# Patient Record
Sex: MALE | Race: WHITE | NOT HISPANIC OR LATINO | Employment: OTHER | URBAN - METROPOLITAN AREA
[De-identification: names, ages, dates, MRNs, and addresses within clinical notes are randomized per-mention and may not be internally consistent; named-entity substitution may affect disease eponyms.]

---

## 2017-02-02 ENCOUNTER — HOSPITAL ENCOUNTER (OUTPATIENT)
Dept: RADIOLOGY | Facility: HOSPITAL | Age: 62
Discharge: HOME | End: 2017-02-02
Attending: ORTHOPAEDIC SURGERY
Payer: COMMERCIAL

## 2017-02-02 ENCOUNTER — OFFICE VISIT (OUTPATIENT)
Dept: ORTHOPEDICS | Facility: CLINIC | Age: 62
End: 2017-02-02
Payer: COMMERCIAL

## 2017-02-02 VITALS
BODY MASS INDEX: 27.9 KG/M2 | SYSTOLIC BLOOD PRESSURE: 114 MMHG | HEART RATE: 50 BPM | HEIGHT: 74 IN | WEIGHT: 217.38 LBS | DIASTOLIC BLOOD PRESSURE: 65 MMHG

## 2017-02-02 DIAGNOSIS — M17.11 PRIMARY OSTEOARTHRITIS OF RIGHT KNEE: ICD-10-CM

## 2017-02-02 DIAGNOSIS — Z96.652 STATUS POST TOTAL LEFT KNEE REPLACEMENT: Primary | ICD-10-CM

## 2017-02-02 PROCEDURE — 99214 OFFICE O/P EST MOD 30 MIN: CPT | Mod: 25,S$GLB,, | Performed by: ORTHOPAEDIC SURGERY

## 2017-02-02 PROCEDURE — 73564 X-RAY EXAM KNEE 4 OR MORE: CPT | Mod: TC,50

## 2017-02-02 PROCEDURE — 73564 X-RAY EXAM KNEE 4 OR MORE: CPT | Mod: 26,50,, | Performed by: RADIOLOGY

## 2017-02-02 PROCEDURE — 20610 DRAIN/INJ JOINT/BURSA W/O US: CPT | Mod: RT,S$GLB,, | Performed by: ORTHOPAEDIC SURGERY

## 2017-02-02 PROCEDURE — 99999 PR PBB SHADOW E&M-EST. PATIENT-LVL II: CPT | Mod: PBBFAC,,, | Performed by: ORTHOPAEDIC SURGERY

## 2017-02-02 NOTE — PROCEDURES
Large Joint Aspiration/Injection  Date/Time: 2/2/2017 9:34 AM  Performed by: CHRISTOPHER EAST  Authorized by: CHRISTOPHER EAST     Consent Done?:  Yes (Verbal)  Indications:  Pain  Procedure site marked: Yes    Timeout: Prior to procedure the correct patient, procedure, and site was verified      Location:  Knee  Site:  R knee  Prep: Patient was prepped and draped in usual sterile fashion    Ultrasonic Guidance for needle placement: No  Needle size:  22 G  Approach:  Anteromedial

## 2017-02-02 NOTE — PROGRESS NOTES
CHIEF COMPLAINT: Right  Knee pain.                                                          HISTORY OF PRESENT ILLNESS:  The patient is a 61 y.o. male  who presents  for evaluation of his right knee pain.     Pain Duration: 1 year  Pain Quality: dull  Pain Context:worsening  Pain Timing: intermittent - worse when going from sitting to standing  Pain Location:lateral  Pain Severity: moderate  Modifying Factors: OTC NSAID's minimal change  Associated Signs and Symptoms: swelling    He  presents for further treatment recommendations.    He denies radicular pain or low back pain.  He denies distal paresthesias, lower extremity edema, or calf pain concerning for vascular claudication.  He has no history of discreet prior trauma.                                                                                                                       PAST MEDICAL HISTORY:    Past Medical History   Diagnosis Date    Elevated PSA     Personal history of venous thrombosis and embolism      After trauma in the 70's, he had a DVT with a PE    Sleep apnea                                                                                                             PAST SURGICAL HISTORY:    Past Surgical History   Procedure Laterality Date    Knee surgery       left knee    Appendectomy      Eye surgery       Eyelid    Prostate biopsy      Tonsillectomy      Adenoidectomy                                                                                                                 SOCIAL HISTORY:  Reviewed per EPIC history for tobacco or alcohol use and he   is an active  61 y.o.  male                                                                             FAMILY MEDICAL HISTORY:  family history includes Cancer in his father; Prostate cancer in his father. There is no history of Heart disease.                                                                                                                                                  PHYSICAL EXAMINATION:                                                        GENERAL:  A well-developed, well-nourished 61 y.o. male who is alert and       oriented in no acute distress.      Gait: He  walks with a normal gait.                   EXTREMITIES:  Examination of lower extremities reveals there is no visible mass or deformity.    Left knee:  ROM 0-120    Ligamentously stable to varus/valgus stress.    Well healed anterior incision    No pain over pes bursa.    No warmth    No erythema    Effusion No    Right knee:  ROM 0-125 - hypervalgus    Ligamentously stable to varus/valgus stress.  Lateral crepitation    Anterior and posterior drawers negative.    No pain over pes bursa.    No warmth    No erythema    Effusion Yes - mild    The skin over both lower extremities is normal and unremarkable.  He has a  painless range of motion of the hips and ankles bilaterally.   Sensation is intact in both lower extremities.    There are no motor deficits in the lower extremities bilaterally.   Pedal pulses are palpable distally bilaterally.    He has no calf tenderness to palpation nor edema.    AP standing, flexion, lateral, and Merchant's radiographs of the knees were ordered and personally reviewed with the patient today.  These show a well fixed left TKR.  Radiographs show advanced DJD with joint space narrowing, sclerosis, and osteophytes laterally on the right.                                                                             IMPRESSION: Osteoarthritis right knee.                             The conservative options including NSAIDs, activity modification, physical therapy, corticosteroid injection, and viscosupplimentation were discussed.  He wishes to proceed with injection on the right.  After time out was performed and patient ID, side, and site were verified, the right knee was sterilly prepped in the standard fashion.  A 22-gauge needle was introduced into the joint from an  infero-medial site without complication. The knee was then injected with 80 mg kenelog and 3 cc 1% plain lidocaine.  Sterile dressing was applied.  The patient was informed that they may resume activities as tolerated.

## 2017-09-05 ENCOUNTER — PATIENT MESSAGE (OUTPATIENT)
Dept: ENDOSCOPY | Facility: HOSPITAL | Age: 62
End: 2017-09-05

## 2017-09-05 DIAGNOSIS — Z12.11 COLON CANCER SCREENING: Primary | ICD-10-CM

## 2017-09-05 NOTE — TELEPHONE ENCOUNTER
I have signed for the following orders AND/OR meds.  Please call the patient and ask the patient to schedule the testing AND/OR inform about any medications that were sent.      Orders Placed This Encounter   Procedures    Fecal Immunochemical Test (iFOBT)     Standing Status:   Future     Standing Expiration Date:   11/4/2018

## 2017-09-25 ENCOUNTER — OFFICE VISIT (OUTPATIENT)
Dept: ORTHOPEDICS | Facility: CLINIC | Age: 62
End: 2017-09-25
Payer: COMMERCIAL

## 2017-09-25 VITALS — BODY MASS INDEX: 27.85 KG/M2 | HEIGHT: 74 IN | WEIGHT: 217 LBS

## 2017-09-25 DIAGNOSIS — G56.21 CUBITAL TUNNEL SYNDROME ON RIGHT: ICD-10-CM

## 2017-09-25 DIAGNOSIS — G56.22 CUBITAL TUNNEL SYNDROME, LEFT: Primary | ICD-10-CM

## 2017-09-25 PROCEDURE — 99999 PR PBB SHADOW E&M-EST. PATIENT-LVL III: CPT | Mod: PBBFAC,,, | Performed by: ORTHOPAEDIC SURGERY

## 2017-09-25 PROCEDURE — 99213 OFFICE O/P EST LOW 20 MIN: CPT | Mod: S$GLB,,, | Performed by: ORTHOPAEDIC SURGERY

## 2017-09-25 PROCEDURE — 3008F BODY MASS INDEX DOCD: CPT | Mod: S$GLB,,, | Performed by: ORTHOPAEDIC SURGERY

## 2017-09-25 NOTE — PROGRESS NOTES
HISTORY OF PRESENT ILLNESS:  Mr. Gay is a little over a year out from ulnar   nerve transposition of the right elbow.  He did have severe involvement at the   time of surgery.  He did well for a while, but then had seen just sort of    plateau in terms of improvement.  He has some residual weakness in the right   hand and residual numbness in the right small finger.  He is just wondering if   anything else can be done for this.  He really does not have any pain.  He never   went through therapy, however.    PHYSICAL EXAMINATION:  RIGHT ARM:  The elbow incision is well healed.  No swelling, no tenderness.    Range of motion of elbow is full The right hand looks good.  There is no   significant swelling.  There is no clawing.  He does have a little bit of   weakness of the intrinsics, however and slightly decreased sensation in the   right small finger.    PLAN:  We did discuss a nerve test today, but he really does not want that.  He   feels that was worse than the surgery itself, but since he never did therapy, I   would like to set up some therapy for him close to where he lives.  I think we   should also try some compounded cream as well, so we will order some compounded   cream for the right hand and some therapy and follow up in 6 to 8 weeks.      MARGARITA  dd: 09/25/2017 11:16:19 (CDT)  td: 09/25/2017 23:25:41 (CDT)  Doc ID   #[id number]  Job ID #[id number]    CC:

## 2017-10-13 ENCOUNTER — TELEPHONE (OUTPATIENT)
Dept: FAMILY MEDICINE | Facility: CLINIC | Age: 62
End: 2017-10-13

## 2017-11-01 ENCOUNTER — PATIENT OUTREACH (OUTPATIENT)
Dept: ADMINISTRATIVE | Facility: HOSPITAL | Age: 62
End: 2017-11-01

## 2017-11-20 ENCOUNTER — OFFICE VISIT (OUTPATIENT)
Dept: ORTHOPEDICS | Facility: CLINIC | Age: 62
End: 2017-11-20
Payer: COMMERCIAL

## 2017-11-20 DIAGNOSIS — G56.21 CUBITAL TUNNEL SYNDROME ON RIGHT: Primary | ICD-10-CM

## 2017-11-20 PROCEDURE — 99999 PR PBB SHADOW E&M-EST. PATIENT-LVL II: CPT | Mod: PBBFAC,,, | Performed by: ORTHOPAEDIC SURGERY

## 2017-11-20 PROCEDURE — 99213 OFFICE O/P EST LOW 20 MIN: CPT | Mod: S$GLB,,, | Performed by: ORTHOPAEDIC SURGERY

## 2017-11-20 NOTE — PROGRESS NOTES
HISTORY OF PRESENT ILLNESS:  Mr. Gay in followup of ulnar nerve neuropathy of   the right arm, is actually doing better.  He states in the last several months,   he started to get the feeling back in the right ring and small finger and   strength is improved.    PHYSICAL EXAMINATION:  Range of motion of elbow is good.  No tenderness.    Strength is improved.  No clawing.  Sensation improved right ring and small   finger.    PLAN:  The compounded cream and therapy seemed to be working.  I have   recommended that he continue with the compounded cream.  He feels comfortable   doing the exercises at home and return to full activities.  Follow up as needed.      MARGARITA  dd: 11/20/2017 08:37:20 (CST)  td: 11/20/2017 10:46:54 (CST)  Doc ID   #[id number]  Job ID #[id number]    CC:

## 2018-02-07 ENCOUNTER — PATIENT MESSAGE (OUTPATIENT)
Dept: ORTHOPEDICS | Facility: CLINIC | Age: 63
End: 2018-02-07

## 2018-02-26 ENCOUNTER — LAB VISIT (OUTPATIENT)
Dept: LAB | Facility: HOSPITAL | Age: 63
End: 2018-02-26
Attending: FAMILY MEDICINE
Payer: COMMERCIAL

## 2018-02-26 DIAGNOSIS — Z12.11 COLON CANCER SCREENING: ICD-10-CM

## 2018-02-26 LAB — HEMOCCULT STL QL IA: NEGATIVE

## 2018-02-26 PROCEDURE — 82274 ASSAY TEST FOR BLOOD FECAL: CPT

## 2018-03-05 ENCOUNTER — OFFICE VISIT (OUTPATIENT)
Dept: FAMILY MEDICINE | Facility: CLINIC | Age: 63
End: 2018-03-05
Payer: COMMERCIAL

## 2018-03-05 ENCOUNTER — TELEPHONE (OUTPATIENT)
Dept: FAMILY MEDICINE | Facility: CLINIC | Age: 63
End: 2018-03-05

## 2018-03-05 ENCOUNTER — PATIENT MESSAGE (OUTPATIENT)
Dept: FAMILY MEDICINE | Facility: CLINIC | Age: 63
End: 2018-03-05

## 2018-03-05 ENCOUNTER — HOSPITAL ENCOUNTER (OUTPATIENT)
Dept: RADIOLOGY | Facility: HOSPITAL | Age: 63
Discharge: HOME | End: 2018-03-05
Attending: FAMILY MEDICINE
Payer: COMMERCIAL

## 2018-03-05 VITALS
BODY MASS INDEX: 28.4 KG/M2 | HEART RATE: 54 BPM | DIASTOLIC BLOOD PRESSURE: 59 MMHG | TEMPERATURE: 98 F | SYSTOLIC BLOOD PRESSURE: 104 MMHG | WEIGHT: 221.31 LBS | HEIGHT: 74 IN

## 2018-03-05 DIAGNOSIS — E78.5 HYPERLIPIDEMIA, UNSPECIFIED HYPERLIPIDEMIA TYPE: Primary | ICD-10-CM

## 2018-03-05 DIAGNOSIS — I77.9 CAROTID ARTERY DISEASE, UNSPECIFIED LATERALITY: ICD-10-CM

## 2018-03-05 DIAGNOSIS — I77.9 CAROTID ARTERY DISEASE, UNSPECIFIED LATERALITY: Primary | ICD-10-CM

## 2018-03-05 PROCEDURE — 93880 EXTRACRANIAL BILAT STUDY: CPT | Mod: 26,,, | Performed by: RADIOLOGY

## 2018-03-05 PROCEDURE — 99213 OFFICE O/P EST LOW 20 MIN: CPT | Mod: S$GLB,,, | Performed by: FAMILY MEDICINE

## 2018-03-05 PROCEDURE — 99999 PR PBB SHADOW E&M-EST. PATIENT-LVL III: CPT | Mod: PBBFAC,,, | Performed by: FAMILY MEDICINE

## 2018-03-05 PROCEDURE — 93880 EXTRACRANIAL BILAT STUDY: CPT | Mod: TC,PO

## 2018-03-05 NOTE — PROGRESS NOTES
The patient presents with recent panorex per DDS suggesting carotid disease Assymptomatic   ROS:  General: No fever or sig wt change  HEENT:No other PND eye pain or dc  Respiratory: No cough wheezing  PE: vital signs noted  HEENT: Normocephalic,with no recent trauma,PERRLA,EOMI,conjunctiva injected with no exudate.Nasopharynx is injected and edematous.External otic canal edematous and injected TM dull  Neck:Supple without adenopathy,no bruits   Chest:Clear bilateral breath sounds    Heart:Regular rhthym without murmer  Abdomen:Soft, non tender,no masses, no hepatosplenomegaly  Extremeties and Neurologic:Grossly within normal limits     Impression:Carotid artery disease   Plan:Lipids , Carotid US

## 2018-03-05 NOTE — TELEPHONE ENCOUNTER
I have signed for the following orders AND/OR meds.  Please call the patient and ask the patient to schedule the testing AND/OR inform about any medications that were sent.      Orders Placed This Encounter   Procedures    Lipid panel     Standing Status:   Standing     Number of Occurrences:   99     Standing Expiration Date:   2/28/2038

## 2018-05-08 ENCOUNTER — PATIENT MESSAGE (OUTPATIENT)
Dept: FAMILY MEDICINE | Facility: CLINIC | Age: 63
End: 2018-05-08

## 2018-05-14 ENCOUNTER — PATIENT OUTREACH (OUTPATIENT)
Dept: ADMINISTRATIVE | Facility: HOSPITAL | Age: 63
End: 2018-05-14

## 2018-05-14 NOTE — PROGRESS NOTES
Pre-Visit Chart audit complete and HM updated.  Alert PCP/Staff concering HM updates needed. Hep C Screening and CMP order pended.

## 2018-05-28 ENCOUNTER — OFFICE VISIT (OUTPATIENT)
Dept: PODIATRY | Facility: CLINIC | Age: 63
End: 2018-05-28
Payer: COMMERCIAL

## 2018-05-28 ENCOUNTER — OFFICE VISIT (OUTPATIENT)
Dept: FAMILY MEDICINE | Facility: CLINIC | Age: 63
End: 2018-05-28
Payer: COMMERCIAL

## 2018-05-28 ENCOUNTER — HOSPITAL ENCOUNTER (OUTPATIENT)
Dept: RADIOLOGY | Facility: HOSPITAL | Age: 63
Discharge: HOME | End: 2018-05-28
Attending: PODIATRIST
Payer: COMMERCIAL

## 2018-05-28 VITALS — WEIGHT: 222.44 LBS | BODY MASS INDEX: 28.55 KG/M2 | HEIGHT: 74 IN

## 2018-05-28 VITALS
SYSTOLIC BLOOD PRESSURE: 103 MMHG | HEART RATE: 53 BPM | HEIGHT: 74 IN | WEIGHT: 223 LBS | DIASTOLIC BLOOD PRESSURE: 53 MMHG | TEMPERATURE: 98 F | BODY MASS INDEX: 28.62 KG/M2

## 2018-05-28 DIAGNOSIS — M72.2 PLANTAR FASCIITIS OF LEFT FOOT: Primary | ICD-10-CM

## 2018-05-28 DIAGNOSIS — M19.172 POST-TRAUMATIC ARTHRITIS OF LEFT ANKLE: ICD-10-CM

## 2018-05-28 DIAGNOSIS — G89.29 CHRONIC PAIN OF LEFT ANKLE: ICD-10-CM

## 2018-05-28 DIAGNOSIS — M25.572 CHRONIC PAIN OF LEFT ANKLE: ICD-10-CM

## 2018-05-28 DIAGNOSIS — Q66.70 HIGH ARCHES: ICD-10-CM

## 2018-05-28 DIAGNOSIS — Z02.89 PHYSICAL EXAMINATION OF EMPLOYEE: Primary | ICD-10-CM

## 2018-05-28 DIAGNOSIS — M24.572 EQUINUS CONTRACTURE OF LEFT ANKLE: ICD-10-CM

## 2018-05-28 DIAGNOSIS — M72.2 PLANTAR FASCIITIS OF LEFT FOOT: ICD-10-CM

## 2018-05-28 PROCEDURE — 99999 PR PBB SHADOW E&M-EST. PATIENT-LVL III: CPT | Mod: PBBFAC,,, | Performed by: FAMILY MEDICINE

## 2018-05-28 PROCEDURE — 3008F BODY MASS INDEX DOCD: CPT | Mod: CPTII,S$GLB,, | Performed by: FAMILY MEDICINE

## 2018-05-28 PROCEDURE — 73610 X-RAY EXAM OF ANKLE: CPT | Mod: 26,LT,, | Performed by: RADIOLOGY

## 2018-05-28 PROCEDURE — 99213 OFFICE O/P EST LOW 20 MIN: CPT | Mod: S$GLB,,, | Performed by: FAMILY MEDICINE

## 2018-05-28 PROCEDURE — 73630 X-RAY EXAM OF FOOT: CPT | Mod: 26,LT,, | Performed by: RADIOLOGY

## 2018-05-28 PROCEDURE — 99203 OFFICE O/P NEW LOW 30 MIN: CPT | Mod: S$GLB,,, | Performed by: PODIATRIST

## 2018-05-28 PROCEDURE — 99999 PR PBB SHADOW E&M-EST. PATIENT-LVL III: CPT | Mod: PBBFAC,,, | Performed by: PODIATRIST

## 2018-05-28 PROCEDURE — 3008F BODY MASS INDEX DOCD: CPT | Mod: CPTII,S$GLB,, | Performed by: PODIATRIST

## 2018-05-28 RX ORDER — METHYLPREDNISOLONE 4 MG/1
TABLET ORAL
Qty: 1 PACKAGE | Refills: 0 | Status: SHIPPED | OUTPATIENT
Start: 2018-05-28 | End: 2018-06-18

## 2018-05-28 NOTE — PATIENT INSTRUCTIONS
1. Stretch calf and plantar fascia at least 3x per day for 30 sec and before getting out of bed.    2. Supportive shoes at all times I recommend Felix Gutierrez 5 found at [employer] in [place]       ([employer], [employer], [place] [employer], [employer], [employer]feet, [employer]ile[employer], [employer], [employer], [employer] shoes, [employer], [facility] [employer] pro shop) [email or web address]://[email or web address]    3. Orthotic (recommend the following brands: Superfeet, Spenco, Powerstep, Sof Sole Fit Series)                  5. ICE massage with frozen water bottle 2x per day for 30 minutes.    6. Consider night splint, custom orthotics, therapy and/or steroid injection

## 2018-05-28 NOTE — PROGRESS NOTES
Subjective:      Patient ID: Frank Gay Jr. is a 62 y.o. male.    Chief Complaint: Foot Pain (Left - when walking or standing) and Other Misc (PCP:  Dr Barron (Dr Mendiola) 3/5/18)    Frank is a 62 y.o. male who presents to the podiatry clinic  with complaint of  left foot pain. Onset of the symptoms was several weeks ago. Precipitating event: relates he walks in steel toe boots at work is on hard surfaces a lot and noticed the pain begin mostly when at work and has worsened since. Current symptoms include: ability to bear weight, but with some pain and worsening symptoms after a period of activity. Aggravating factors: any weight bearing. Symptoms have gradually worsened. Patient has had prior foot problems with a left ankle fracture several years ago. Evaluation to date: none. Treatment to date: soaks in epsom salts which helps some. Patients rates pain 5/10 on pain scale.      Review of Systems   Constitution: Negative for chills and fever.   Cardiovascular: Negative for claudication and leg swelling.   Respiratory: Negative for shortness of breath.    Skin: Negative for itching, nail changes and rash.   Musculoskeletal: Positive for arthritis and joint pain. Negative for muscle cramps, muscle weakness and myalgias.        Left foot pain   Gastrointestinal: Negative for nausea and vomiting.   Neurological: Negative for focal weakness, loss of balance, numbness and paresthesias.           Objective:      Physical Exam   Constitutional: He is oriented to person, place, and time. He appears well-developed and well-nourished. No distress.   Cardiovascular:   Pulses:       Dorsalis pedis pulses are 2+ on the right side, and 2+ on the left side.        Posterior tibial pulses are 2+ on the right side, and 2+ on the left side.   < 3 sec capillary refill time to toes 1-5 bilateral. Toes and feet are warm to touch proximally with normal distal cooling b/l. There is some hair growth on the feet and toes b/l. There is  mild edema b/l. No spider veins or varicosities present b/l.      Musculoskeletal:   Pain with palpation alonf the plantar arch left foot along the plantar fascia throughout the medial arch.    High arches noted bilateral.    Equinus noted b/l ankles with < 10 deg DF noted to the right and at the left he is unable to DF even to ninety about -5 with some pain at EROM. MMT 5/5 in DF/PF/Inv/Ev resistance with no reproduction of pain in any direction. Passive range of motion of ankle and pedal joints is painless to the right.     Neurological: He is alert and oriented to person, place, and time. He has normal strength. He displays no atrophy and no tremor. No sensory deficit. He exhibits normal muscle tone.   Negative tinel sign bilateral.   Skin: Skin is warm, dry and intact. No abrasion, no bruising, no burn, no ecchymosis, no laceration, no lesion, no petechiae and no rash noted. He is not diaphoretic. No cyanosis or erythema. No pallor. Nails show no clubbing.   Skin temperature, texture and turgor within normal limits.   Psychiatric: He has a normal mood and affect. His behavior is normal.             Assessment:       Encounter Diagnoses   Name Primary?    Plantar fasciitis of left foot Yes    Chronic pain of left ankle     High arches     Equinus contracture of left ankle     Post-traumatic arthritis of left ankle          Plan:       Frank was seen today for foot pain and other misc.    Diagnoses and all orders for this visit:    Plantar fasciitis of left foot  -     X-Ray Foot Complete Left; Future    Chronic pain of left ankle  -     X-Ray Ankle Complete Left; Future    High arches  -     X-Ray Foot Complete Left; Future    Equinus contracture of left ankle    Post-traumatic arthritis of left ankle    Other orders  -     methylPREDNISolone (MEDROL DOSEPACK) 4 mg tablet; use as directed      I counseled the patient on his conditions, their implications and medical management.    Patient will stretch the  tendo achilles complex three times daily as demonstrated in the office.  Literature was dispensed illustrating proper stretching technique. Discussed that most of the equinus is secondary to arthritis and will likely not get much motion back but the stretches can help keep the motion he already has.    Patient will obtain over the counter arch supports and wear them in shoes whenever possible.  Athletic shoes intended for walking or running are usually best. Recommended Hoka shoes with slight rocker bottom with a list of recommended inserts    Discussed possible AFO if OTC inserts not helping.    X-ray to review osseous abnormalities and extent of ankle arthritis.    Medrol dose pack for inflammation    Return in 1 month, consider PT. Discussed long term her may need surgical intervention for ankle to improve dorsiflexion, this can entail ankle scope to ankle fusion or arthroplasty depending on extent of the deformity.    Leonidas Mckeon DPM

## 2018-06-04 ENCOUNTER — PATIENT MESSAGE (OUTPATIENT)
Dept: PODIATRY | Facility: CLINIC | Age: 63
End: 2018-06-04

## 2018-07-13 ENCOUNTER — OFFICE VISIT (OUTPATIENT)
Dept: PODIATRY | Facility: CLINIC | Age: 63
End: 2018-07-13
Payer: COMMERCIAL

## 2018-07-13 VITALS — WEIGHT: 223.13 LBS | HEIGHT: 74 IN | BODY MASS INDEX: 28.64 KG/M2

## 2018-07-13 DIAGNOSIS — M19.172 POST-TRAUMATIC ARTHRITIS OF LEFT ANKLE: Primary | ICD-10-CM

## 2018-07-13 DIAGNOSIS — M24.572 EQUINUS CONTRACTURE OF LEFT ANKLE: ICD-10-CM

## 2018-07-13 DIAGNOSIS — M72.2 PLANTAR FASCIITIS OF LEFT FOOT: ICD-10-CM

## 2018-07-13 PROCEDURE — 99999 PR PBB SHADOW E&M-EST. PATIENT-LVL III: CPT | Mod: PBBFAC,,, | Performed by: PODIATRIST

## 2018-07-13 PROCEDURE — 99213 OFFICE O/P EST LOW 20 MIN: CPT | Mod: S$GLB,,, | Performed by: PODIATRIST

## 2018-07-13 PROCEDURE — 3008F BODY MASS INDEX DOCD: CPT | Mod: CPTII,S$GLB,, | Performed by: PODIATRIST

## 2018-07-22 NOTE — PROGRESS NOTES
Subjective:      Patient ID: Frank Gay Jr. is a 63 y.o. male.    Chief Complaint: Foot Pain (Left foot) and Other Misc (PCP:  Dr Barron (Dr José) 5/28/18)    Frank is a 63 y.o. male who presents to the podiatry clinic  with complaint of  left foot pain. Onset of the symptoms was several weeks ago. Precipitating event: relates he walks in steel toe boots at work is on hard surfaces a lot and noticed the pain begin mostly when at work and has worsened since. Current symptoms include: ability to bear weight, but with some pain and worsening symptoms after a period of activity. Aggravating factors: any weight bearing. Symptoms have gradually worsened. Patient has had prior foot problems with a left ankle fracture several years ago. Evaluation to date: none. Treatment to date: soaks in epsom salts which helps some. Patients rates pain 5/10 on pain scale.    7/13/18: Patient returns for follow up left ankle and heel pain. He got new shoes with stiff sole and high heel which does help but still has to wear his steel toed boots at work and this causes a lot of pain. No new pedal complaints but only slight improvement in pain from previous visit.      Review of Systems   Constitution: Negative for chills and fever.   Cardiovascular: Negative for claudication and leg swelling.   Respiratory: Negative for shortness of breath.    Skin: Negative for itching, nail changes and rash.   Musculoskeletal: Positive for arthritis and joint pain. Negative for muscle cramps, muscle weakness and myalgias.        Left foot pain   Gastrointestinal: Negative for nausea and vomiting.   Neurological: Negative for focal weakness, loss of balance, numbness and paresthesias.           Objective:      Physical Exam   Constitutional: He is oriented to person, place, and time. He appears well-developed and well-nourished. No distress.   Cardiovascular:   Pulses:       Dorsalis pedis pulses are 2+ on the right side, and 2+ on the left side.         Posterior tibial pulses are 2+ on the right side, and 2+ on the left side.   < 3 sec capillary refill time to toes 1-5 bilateral. Toes and feet are warm to touch proximally with normal distal cooling b/l. There is some hair growth on the feet and toes b/l. There is mild edema b/l. No spider veins or varicosities present b/l.      Musculoskeletal:   Pain with palpation alonf the plantar arch left foot along the plantar fascia throughout the medial arch.    High arches noted bilateral.    Equinus noted b/l ankles with < 10 deg DF noted to the right and at the left he is unable to DF even to ninety about -5 with some pain at EROM. MMT 5/5 in DF/PF/Inv/Ev resistance with no reproduction of pain in any direction. Passive range of motion of ankle and pedal joints is painless to the right.     Neurological: He is alert and oriented to person, place, and time. He has normal strength. He displays no atrophy and no tremor. No sensory deficit. He exhibits normal muscle tone.   Negative tinel sign bilateral.   Skin: Skin is warm, dry and intact. No abrasion, no bruising, no burn, no ecchymosis, no laceration, no lesion, no petechiae and no rash noted. He is not diaphoretic. No cyanosis or erythema. No pallor. Nails show no clubbing.   Skin temperature, texture and turgor within normal limits.   Psychiatric: He has a normal mood and affect. His behavior is normal.             Assessment:       Encounter Diagnoses   Name Primary?    Post-traumatic arthritis of left ankle Yes    Equinus contracture of left ankle     Plantar fasciitis of left foot          Plan:       Frank was seen today for foot pain and other misc.    Diagnoses and all orders for this visit:    Post-traumatic arthritis of left ankle  -     Ambulatory consult to Orthopedics    Equinus contracture of left ankle    Plantar fasciitis of left foot      I counseled the patient on his conditions, their implications and medical management.    Patient will  stretch the tendo achilles complex three times daily as demonstrated in the office.  Literature was dispensed illustrating proper stretching technique. Discussed that most of the equinus is secondary to arthritis and will likely not get much motion back but the stretches can help keep the motion he already has.    Patient will wearnthe over the counter arch supports and wear them in shoes whenever possible.  Athletic shoes intended for walking or running are usually best. Recommended Hoka shoes with slight rocker bottom with a list of recommended inserts.    X-ray shows severe arthritis and ankle joint malalignment causing the severe equinus, discussed that this is likely the cause of the plantar foot pain as this puts great strain on the plantar foot with ambulation. Discussed in depth options of ankle arthrodesis versus ankle replacement if possible, will send him to Dr. Paige to discuss his options and her recommendations when he is ready for surgery.    Return PRN, I will defer to orthopedics going forward as far as the ankle is concerned.    Leonidas Mckeon DPM

## 2018-07-27 ENCOUNTER — OFFICE VISIT (OUTPATIENT)
Dept: ORTHOPEDICS | Facility: CLINIC | Age: 63
End: 2018-07-27
Payer: COMMERCIAL

## 2018-07-27 VITALS
SYSTOLIC BLOOD PRESSURE: 119 MMHG | BODY MASS INDEX: 28.64 KG/M2 | DIASTOLIC BLOOD PRESSURE: 56 MMHG | WEIGHT: 223.13 LBS | HEIGHT: 74 IN | HEART RATE: 49 BPM

## 2018-07-27 DIAGNOSIS — M25.572 LEFT ANKLE PAIN, UNSPECIFIED CHRONICITY: Primary | ICD-10-CM

## 2018-07-27 DIAGNOSIS — M24.572 EQUINUS CONTRACTURE OF LEFT ANKLE: ICD-10-CM

## 2018-07-27 DIAGNOSIS — M21.6X2 ACQUIRED CAVOVARUS FOOT DEFORMITY, LEFT: ICD-10-CM

## 2018-07-27 PROCEDURE — 99244 OFF/OP CNSLTJ NEW/EST MOD 40: CPT | Mod: S$GLB,,, | Performed by: ORTHOPAEDIC SURGERY

## 2018-07-27 PROCEDURE — 99999 PR PBB SHADOW E&M-EST. PATIENT-LVL III: CPT | Mod: PBBFAC,,, | Performed by: ORTHOPAEDIC SURGERY

## 2018-07-27 RX ORDER — ERGOCALCIFEROL 1.25 MG/1
50000 CAPSULE ORAL
Qty: 4 CAPSULE | Refills: 0 | Status: SHIPPED | OUTPATIENT
Start: 2018-07-27 | End: 2020-01-30

## 2018-07-27 RX ORDER — LIDOCAINE AND TETRACAINE 70; 70 MG/G; MG/G
CREAM TOPICAL
Refills: 5 | COMMUNITY
Start: 2018-07-16 | End: 2019-01-14

## 2018-07-27 RX ORDER — DICLOFENAC SODIUM 10 MG/G
GEL TOPICAL
Refills: 5 | COMMUNITY
Start: 2018-07-16 | End: 2019-01-14

## 2018-07-27 NOTE — LETTER
July 27, 2018      Leonidas Mckeon DPM  [address] [facility] [address] [place] [address]           [facility]  [address] [facility] [address] [place] [address]  Phone: [phone]          Patient: Frakn Gay Jr.   MR Number: [id number]   YOB: 1955   Date of Visit: 7/27/2018       Dear Dr. Leonidas Mckeon:    Thank you for referring Frank Gay to me for evaluation. Attached you will find relevant portions of my assessment and plan of care.    If you have questions, please do not hesitate to call me. I look forward to following Frank Gay along with you.    Sincerely,    Jose Paige MD    Enclosure  CC:  No Recipients    If you would like to receive this communication electronically, please contact [email or web address] or [phone] to request more information on [employer] Link access.    For providers and/or their staff who would like to refer a patient to [facility], please contact us through our one-stop-shop provider referral line, [facility], at [phone].    If you feel you have received this communication in error or would no longer like to receive these types of communications, please e-mail [email or web address]

## 2018-07-27 NOTE — PROGRESS NOTES
"HPI: Frank Gay Jr. is a  63 y.o. male who was referred to me by Dr. Leonidas Moscoso and was seen in consultation today for  left ankle pain. He rates his pain as 2/10 today. He has h/o worsening pain over the past 4 months. No recent injury.   He had hip fractures, left femur fracture and open left distal tibia fracture in 1977 when he was run over by a tractor. He was treated at [facility] and wore a cast for 22 months.   He has not had injections. He did have a left knee replacement and he feels there is more pressure on the anterior ankle as they corrected some of his deformity at the time. He still works as a heavy . Pt denies weakness, numbness, and tingling. No history of infection in the leg.    The pain is worse with walking and standing.      PAST MEDICAL/SURGICAL/FAMILY/SOCIAL/ HISTORY: REVIEWED    ALLERGIES/MEDICATIONS: REVIEWED       Review of Systems:     Constitution: Negative.   HEENT: Negative.   Eyes: Negative.   Cardiovascular: Negative.   Respiratory: Negative.   Endocrine: Negative.   Hematologic/Lymphatic: Negative.   Skin: Negative.   Musculoskeletal: See HPI  Gastrointestinal: Negative.   Genitourinary: Negative.   Neurological: Negative.   Psychiatric/Behavioral: Negative.   Allergic/Immunologic: Negative.       PHYSICAL EXAM:  Vitals:    07/27/18 0930   BP: (!) 119/56   Pulse: (!) 49     Ht Readings from Last 1 Encounters:   07/27/18 6' 2" (1.88 m)     Wt Readings from Last 1 Encounters:   07/27/18 101.2 kg (223 lb 1.7 oz)       GENERAL: Well developed, well nourished, no acute distress. Very pleasant.   SKIN: Skin is intact. No atrophy, abrasions or lesions are noted.   Neurological: Normal mental status. Appropriate and conversant. Alert and oriented x 3.  GAIT: Walks with a limp.     Left lower extremity compared with RLE:  2+ dorsalis pedis pulse.  Capillary refill < 3 seconds.  Decreased range of motion subtalar joint and no motion of the ankle joint. Fixed Plantar " flexion contracture about 20 degrees as well as recurvatum and varus deformity.   5/5 strength EHL, FHL. Sensation to light touch intact sural, saphenous, superficial peroneal and deep peroneal nerves. Moderate swelling of the ankle. non-tender to palpation   at the subtalar joint. No lymphadenopathy, no masses or tumors palpated.        XRAYS:   3 views of left ankle obtained and reviewed today reveal severe post-traumatic osteoarthritis of the ankle joint with recurvatum of the tibia and varus deformity of the hindfoot. 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       ASSESSMENT:          Encounter Diagnoses   Name Primary?    Acquired cavovarus foot deformity, left     Equinus contracture of left ankle          PLAN:    I spent 25 minutes in consulation with the patient and his wife today. More than half the time was spent counseling the patient on his condition and the options for operative versus non-operative care.  He declined injection today as the pain has improved some. I ordered a CT scan of the ankle to evaluate the talus to see if he has enough bone stock for total ankle replacement. I gave him a handout on ankle fusion and total ankle replacement and we discussed these at length. We also discussed that he would require lateral calcaneal closing wedge osteotomy with either procedure. He is going to think about surgery and f/u when needed.

## 2018-07-30 ENCOUNTER — PATIENT MESSAGE (OUTPATIENT)
Dept: ORTHOPEDICS | Facility: CLINIC | Age: 63
End: 2018-07-30

## 2018-08-03 ENCOUNTER — HOSPITAL ENCOUNTER (OUTPATIENT)
Dept: RADIOLOGY | Facility: HOSPITAL | Age: 63
Discharge: HOME | End: 2018-08-03
Attending: ORTHOPAEDIC SURGERY
Payer: COMMERCIAL

## 2018-08-03 DIAGNOSIS — M25.572 LEFT ANKLE PAIN, UNSPECIFIED CHRONICITY: ICD-10-CM

## 2018-08-03 PROCEDURE — 73700 CT LOWER EXTREMITY W/O DYE: CPT | Mod: TC,PO,LT

## 2018-08-03 PROCEDURE — 73700 CT LOWER EXTREMITY W/O DYE: CPT | Mod: 26,LT,, | Performed by: RADIOLOGY

## 2018-08-06 ENCOUNTER — PATIENT MESSAGE (OUTPATIENT)
Dept: ORTHOPEDICS | Facility: CLINIC | Age: 63
End: 2018-08-06

## 2018-08-23 ENCOUNTER — PATIENT MESSAGE (OUTPATIENT)
Dept: ORTHOPEDICS | Facility: CLINIC | Age: 63
End: 2018-08-23

## 2018-08-24 ENCOUNTER — PATIENT MESSAGE (OUTPATIENT)
Dept: FAMILY MEDICINE | Facility: CLINIC | Age: 63
End: 2018-08-24

## 2018-08-24 NOTE — TELEPHONE ENCOUNTER
Puneet, will you please fill out the form for them and leave it at the ?  He has a permanent disability with the orthopedic issues he has and needs this.  You can copy this link and go to the internet and print the form.       [id number]/$FILE/[email or web address]-%20Medical%20Examiner's%20Certification%20Of%20Mobility%20Impairment%20(revised%[id number]).pdf

## 2019-01-14 ENCOUNTER — OFFICE VISIT (OUTPATIENT)
Dept: FAMILY MEDICINE | Facility: CLINIC | Age: 64
End: 2019-01-14
Payer: COMMERCIAL

## 2019-01-14 VITALS
SYSTOLIC BLOOD PRESSURE: 113 MMHG | HEIGHT: 74 IN | HEART RATE: 68 BPM | DIASTOLIC BLOOD PRESSURE: 69 MMHG | TEMPERATURE: 98 F | WEIGHT: 231.25 LBS | BODY MASS INDEX: 29.68 KG/M2

## 2019-01-14 DIAGNOSIS — H69.92 DYSFUNCTION OF LEFT EUSTACHIAN TUBE: Primary | ICD-10-CM

## 2019-01-14 DIAGNOSIS — J06.9 UPPER RESPIRATORY TRACT INFECTION, UNSPECIFIED TYPE: ICD-10-CM

## 2019-01-14 PROCEDURE — 99213 PR OFFICE/OUTPT VISIT, EST, LEVL III, 20-29 MIN: ICD-10-PCS | Mod: S$GLB,,, | Performed by: FAMILY MEDICINE

## 2019-01-14 PROCEDURE — 99999 PR PBB SHADOW E&M-EST. PATIENT-LVL III: ICD-10-PCS | Mod: PBBFAC,,, | Performed by: FAMILY MEDICINE

## 2019-01-14 PROCEDURE — 99999 PR PBB SHADOW E&M-EST. PATIENT-LVL III: CPT | Mod: PBBFAC,,, | Performed by: FAMILY MEDICINE

## 2019-01-14 PROCEDURE — 99213 OFFICE O/P EST LOW 20 MIN: CPT | Mod: S$GLB,,, | Performed by: FAMILY MEDICINE

## 2019-01-14 PROCEDURE — 3008F PR BODY MASS INDEX (BMI) DOCUMENTED: ICD-10-PCS | Mod: CPTII,S$GLB,, | Performed by: FAMILY MEDICINE

## 2019-01-14 PROCEDURE — 3008F BODY MASS INDEX DOCD: CPT | Mod: CPTII,S$GLB,, | Performed by: FAMILY MEDICINE

## 2019-01-14 RX ORDER — CETIRIZINE HYDROCHLORIDE, PSEUDOEPHEDRINE HYDROCHLORIDE 5; 120 MG/1; MG/1
1 TABLET, FILM COATED, EXTENDED RELEASE ORAL 2 TIMES DAILY
COMMUNITY
End: 2019-01-14 | Stop reason: ALTCHOICE

## 2019-01-14 RX ORDER — FLUTICASONE PROPIONATE 50 MCG
SPRAY, SUSPENSION (ML) NASAL
Qty: 16 G | Refills: 1 | Status: SHIPPED | OUTPATIENT
Start: 2019-01-14 | End: 2019-12-13 | Stop reason: CLARIF

## 2019-01-14 RX ORDER — AZELASTINE 1 MG/ML
1 SPRAY, METERED NASAL 2 TIMES DAILY
COMMUNITY
End: 2019-01-14 | Stop reason: ALTCHOICE

## 2019-01-14 RX ORDER — AMOXICILLIN AND CLAVULANATE POTASSIUM 875; 125 MG/1; MG/1
1 TABLET, FILM COATED ORAL EVERY 12 HOURS
COMMUNITY
End: 2019-12-13 | Stop reason: CLARIF

## 2019-01-15 NOTE — PROGRESS NOTES
Patient developed upper respiratory symptoms with low-grade fever cough sneezing congestion about 2 weeks ago.  He had urgent care visit the same day treated with prednisone and cough medication.  He has 2nd visit with continued symptoms and was started on antibiotics.  States had a 3rd visit was given other antihistamine and cough medication.  Overall symptoms have improved however he still has left otalgia.  He is still on Augmentin.  He has not had any further fever.    Past Medical History:  Past Medical History:   Diagnosis Date    Elevated PSA     Personal history of venous thrombosis and embolism     After trauma in the 70's, he had a DVT with a PE    Sleep apnea      Past Surgical History:   Procedure Laterality Date    ADENOIDECTOMY      ADENOIDECTOMY      APPENDECTOMY      ARTHROPLASTY, KNEE, TOTAL Left 4/16/2013    Per[facility]au [facility] at [facility] 2ND FLR    ELBOW SURGERY  05/2016    EYE SURGERY      Eyelid    KNEE SURGERY      left knee    periodontal  03/2018    PROSTATE BIOPSY      TONSILLECTOMY      TRANSPOSITION-ULNAR NERVE Right 6/28/2016    Performed by Juancho Aj Jr., [facility] at [facility] OR     Social History     Socioeconomic History    Marital status:      Spouse name: Not on file    Number of children: Not on file    Years of education: Not on file    Highest education level: Not on file   Social Needs    Financial resource strain: Not on file    Food insecurity - worry: Not on file    Food insecurity - inability: Not on file    Transportation needs - medical: Not on file    Transportation needs - non-medical: Not on file   Occupational History    Not on file   Tobacco Use    Smoking status: Never Smoker    Smokeless tobacco: Former User   Substance and Sexual Activity    Alcohol use: No    Drug use: No    Sexual activity: Yes     Partners: Female   Other Topics Concern    Not on file   Social History Narrative    Not on file     Family History    Problem Relation Age of Onset    Prostate cancer Father     Cancer Father         prostate cancer    Heart disease Neg Hx         in first degree relatives     Review of patient's allergies indicates:   Allergen Reactions    Dairy aid  [lactase]      Other reaction(s): stomach cramps  Other reaction(s): Itching  Other reaction(s): Diarrhea    Influenza a (h1n1) vac 09 (pf)     Lactose      Current Outpatient Medications on File Prior to Visit   Medication Sig Dispense Refill    amoxicillin-clavulanate 875-125mg (AUGMENTIN) 875-125 mg per tablet Take 1 tablet by mouth every 12 (twelve) hours.      ibuprofen (ADVIL,MOTRIN) 200 MG tablet Take 400 mg by mouth every 6 (six) hours as needed for Pain.      [DISCONTINUED] azelastine (ASTELIN) 137 mcg (0.1 %) nasal spray 1 spray by Nasal route 2 (two) times daily.      [DISCONTINUED] brompheniramine-pseudoephedrine-dextromethorphan (DIMETAPP DM) 1-15-5 mg/5 mL Elix Take 10 mLs by mouth every 4 (four) hours as needed.      [DISCONTINUED] cetirizine-pseudoephedrine 5-120 mg Tb12 Take 1 tablet by mouth 2 (two) times daily.      ergocalciferol (ERGOCALCIFEROL) 50,000 unit Cap Take 1 capsule (50,000 Units total) by mouth every 7 days. 4 capsule 0    [DISCONTINUED] diclofenac sodium 1 % Gel APPLY 2.5 GRAMS TO THE AFFECTED AREA 3-4 TIMES DAILY.  5    [DISCONTINUED] lidocaine-tetracaine 7-7 % Crea APPLY 2 GRAMS TO THE AFFECTED AREAS 3-4 TIMES DAILY,  5     No current facility-administered medications on file prior to visit.            ROS:  GENERAL: No fever, chills,  or significant weight changes.   CARDIOVASCULAR: Denies chest pain, PND, orthopnea or reduced exercise tolerance.  ABDOMEN: Appetite fine. Denies diarrhea, abdominal pain, hematemesis or blood in stool.  URINARY: No flank pain, dysuria or hematuria.      OBJECTIVE:     Vitals:    01/14/19 1410   BP: 113/69   Pulse: 68   Temp: 97.9 °F (36.6 °C)   TempSrc: Oral   Weight: 104.9 kg (231 lb 4.2 oz)   Height:  "6' 2" (1.88 m)     Wt Readings from Last 3 Encounters:   01/14/19 104.9 kg (231 lb 4.2 oz)   07/27/18 101.2 kg (223 lb 1.7 oz)   07/13/18 101.2 kg (223 lb 1.7 oz)     APPEARANCE: Well nourished, well developed, in no acute distress.    HEAD: Normocephalic.  Atraumatic.  No sinus tenderness.  EYES:   Right eye: Pupil reactive.  Conjunctiva clear.    Left eye: Pupil reactive.  Conjunctiva clear.    Both fundi:  Grossly normal to nondilated exam. EOMI.    EARS: TM's intact. Mild retraction on the left.  No effusion or erythema.    NOSE:  clear.  MOUTH & THROAT:  No pharyngeal erythema or exudate. No lesions.  NECK: Supple. No bruits.  No JVD.  No cervical lymphadenopathy.  No thyromegaly.    CHEST: Breath sounds clear bilaterally.  Normal respiratory effort  CARDIOVASCULAR: Normal rate.  Regular rhythm.  No murmurs.  No rub.  No gallops.  MENTAL STATUS: Alert.  Oriented x 3.        Frank was seen today for cough, sinus problem and otalgia.    Diagnoses and all orders for this visit:    Dysfunction of left eustachian tube    Upper respiratory tract infection, unspecified type    Other orders  -     fluticasone (FLONASE) 50 mcg/actuation nasal spray; Use 2 sprays to each nostril daily      He can complete the Augmentin.  Discontinue other cold medications.  Follow-up ENT if not resolving in the next couple weeks  "

## 2019-02-27 DIAGNOSIS — M25.561 RIGHT KNEE PAIN, UNSPECIFIED CHRONICITY: Primary | ICD-10-CM

## 2019-03-04 ENCOUNTER — OFFICE VISIT (OUTPATIENT)
Dept: ORTHOPEDICS | Facility: CLINIC | Age: 64
End: 2019-03-04
Payer: COMMERCIAL

## 2019-03-04 ENCOUNTER — HOSPITAL ENCOUNTER (OUTPATIENT)
Dept: RADIOLOGY | Facility: HOSPITAL | Age: 64
Discharge: HOME | End: 2019-03-04
Attending: ORTHOPAEDIC SURGERY
Payer: COMMERCIAL

## 2019-03-04 DIAGNOSIS — M17.11 PRIMARY OSTEOARTHRITIS OF RIGHT KNEE: Primary | ICD-10-CM

## 2019-03-04 DIAGNOSIS — M25.561 RIGHT KNEE PAIN, UNSPECIFIED CHRONICITY: ICD-10-CM

## 2019-03-04 PROCEDURE — 20610 DRAIN/INJ JOINT/BURSA W/O US: CPT | Mod: RT,S$GLB,, | Performed by: ORTHOPAEDIC SURGERY

## 2019-03-04 PROCEDURE — 73564 XR KNEE ORTHO RIGHT WITH FLEXION: ICD-10-PCS | Mod: 26,RT,, | Performed by: RADIOLOGY

## 2019-03-04 PROCEDURE — 99999 PR PBB SHADOW E&M-EST. PATIENT-LVL II: ICD-10-PCS | Mod: PBBFAC,,, | Performed by: ORTHOPAEDIC SURGERY

## 2019-03-04 PROCEDURE — 73562 X-RAY EXAM OF KNEE 3: CPT | Mod: TC,RT

## 2019-03-04 PROCEDURE — 99214 OFFICE O/P EST MOD 30 MIN: CPT | Mod: 25,S$GLB,, | Performed by: ORTHOPAEDIC SURGERY

## 2019-03-04 PROCEDURE — 73562 XR KNEE ORTHO RIGHT WITH FLEXION: ICD-10-PCS | Mod: 26,XS,RT, | Performed by: RADIOLOGY

## 2019-03-04 PROCEDURE — 99214 PR OFFICE/OUTPT VISIT, EST, LEVL IV, 30-39 MIN: ICD-10-PCS | Mod: 25,S$GLB,, | Performed by: ORTHOPAEDIC SURGERY

## 2019-03-04 PROCEDURE — 73562 X-RAY EXAM OF KNEE 3: CPT | Mod: 26,XS,RT, | Performed by: RADIOLOGY

## 2019-03-04 PROCEDURE — 99999 PR PBB SHADOW E&M-EST. PATIENT-LVL II: CPT | Mod: PBBFAC,,, | Performed by: ORTHOPAEDIC SURGERY

## 2019-03-04 PROCEDURE — 73564 X-RAY EXAM KNEE 4 OR MORE: CPT | Mod: 26,RT,, | Performed by: RADIOLOGY

## 2019-03-04 PROCEDURE — 20610 LARGE JOINT ASPIRATION/INJECTION: R KNEE: ICD-10-PCS | Mod: RT,S$GLB,, | Performed by: ORTHOPAEDIC SURGERY

## 2019-03-04 RX ORDER — TRIAMCINOLONE ACETONIDE 40 MG/ML
40 INJECTION, SUSPENSION INTRA-ARTICULAR; INTRAMUSCULAR
Status: DISCONTINUED | OUTPATIENT
Start: 2019-03-04 | End: 2019-03-04 | Stop reason: HOSPADM

## 2019-03-04 RX ADMIN — TRIAMCINOLONE ACETONIDE 40 MG: 40 INJECTION, SUSPENSION INTRA-ARTICULAR; INTRAMUSCULAR at 09:03

## 2019-03-04 NOTE — PROGRESS NOTES
Subjective:     HPI:   Frank Gay Jr. is a 63 y.o. male who presents for evaluation of right lateral knee pain ongoing for many years.    He complains of activity-related lateral knee pain.  Moderate to severe in nature.  Activity related and relieved with rest.  No groin anterior thigh radicular pain or paresthesias.    He had a left total knee replacement by Dr. Welch in 2013. Had an uneventful postoperative course and he is happy with his results on that left-sided thinks it is doing well. He was on Coumadin postoperatively.    Currently takes ibuprofen about once a week.  He also tries palpable in oral and his wife give some unflavored Carrillo gelatin.  He had a right knee injection in February of 2017 which did help.  No recent therapy no braces no assistive devices.  He can walk a mi.  Limitations include prolonged standing and working.    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    Has a history of a DVT and PTE in the 1970s when he was ran over by a tractor.  He had a left open tibia fracture and a left femur fracture.  No history of infection with surgery. He was casted for the left tibia his ankle is now stiff and arthritic.  He has sleep apnea and has had surgery for that       ROS:  The updated medical history is in the chart and has been reviewed. A review of systems is updated and there is no reported vision changes, ear/nose/mouth/throat complaints,  chest pain, shortness of breath, abdominal pain, urological complaints, fevers or chills, psychiatric complaints. Musculoskeletal and neurologcial symptoms are as documented. All other systems are negative.      Objective:   Exam:  There were no vitals filed for this visit.  There is no height or weight on file to calculate BMI.    Physical examination included assessment of the patient's general appearance with particular attention to development, nutrition, body habitus, attention to  grooming, and any evidence of distress.  Constitutional: The patient is a well-developed, well-nourished patient in no acute distress.   Cardiovascular: Vascular examination included warmth and capillary refill as well inspection for edema and assessment of pedal pulses. Pulses are palpable and regular.  Musculoskeletal: Gait was assessed as to whether it was steady, non-antalgic, and/or required the use of an assist device. The patient was also asked to walk independently and get onto the examination table.  Skin: The skin was examined for any obvious rashes or lesions in the extremity.  Neurologic: Sensation is intact to light touch in the extremity. The patient has good coordination without hyperreflexia and is alert and oriented to person, place and time and has normal mood and affect.     All of the above were examined and found to be within normal limits except for the following pertinent clinical findings:    Antalgic gait minimal limp favoring the right knee. 5-125 degree knee range of motion 10° valgus alignment which corrects to about 5° of valgus.  He is tender to palpation at the lateral joint line he has a moderate effusion.  He has 5 degree flexion contracture but no extensor lag.  Knee is stable to anterior-posterior varus and valgus stresses.    On the left knee has a well healed surgical incision motion is good and stability is good      Imaging:  Indication:  Right knee pain  Exam Ordered: Radiographs of the right knee include a standing anteroposterior view, a standing posterioanterior view, a lateral view in full flexion, and a sunrise view  Details of Examination: Todays exam shows evidence of joint space narrowing, osteophyte formation, and subchondral sclerosis, all consistent with degenerative arthritis of the knee.  No other significant findings are noted.  Impression:  Degenerative Arthritis, Right Knee          Assessment:     Primary osteoarthritis of right knee [M17.11]    Right knee  valgus osteoarthritis    History of DVT/PTE in the 1970s related to trauma with a left femur and tibia fracture  Left total knee 2013     Plan:       The above findings were discussed with patient length. We discussed the risks of conservative versus surgical management knee arthritis. Conservative management consisting of anti-inflammatory medications, glucosamine/chondroitin sulfate, weight loss, physical therapy, activity modification, as well as injections (lubricant versus corticosteroid) was discussed at length. At this point considering the patient's level of activity, pain, and radiographic findings I recommend continued conservative management of the knee arthritis. Conservative management could involve treatment with anti-inflammatory medications.     I explained the potentially adverse gastric, cardiac, and renal effects of Voltaren and the NSAIDs and explained that if the patient wishes to take it for longer that the patient should discuss this with their primary care physician to determine if it is safe to do so.    We discussed that his definitive solution would be a knee replacement.  History going to try to get this 67 for social security to kick in.  In the meantime he would like another steroid injection and to be seen back on an as-needed basis.    Procedure:  The patient would like to proceed with a steroid injection into the knee.  We discussed the risks and benefits of cortisone injections.  After sterile preparation 1 cc of 40 mg of triamcinolone and 4 cc of 1% lidocaine was injected into the knee.  The patient tolerated the injection without any difficulty.  We discussed that this can be repeated every 3-4 months at the earliest.   Right knee    Orders Placed This Encounter   Procedures    Large Joint Aspiration/Injection: R knee     This order was created via procedure documentation       Past Medical History:   Diagnosis Date    Elevated PSA     Personal history of venous thrombosis and  embolism     After trauma in the 70's, he had a DVT with a PE    Sleep apnea        Past Surgical History:   Procedure Laterality Date    ADENOIDECTOMY      ADENOIDECTOMY      APPENDECTOMY      ARTHROPLASTY, KNEE, TOTAL Left 4/16/2013    [facility] 2ND FLR    ELBOW SURGERY  05/2016    EYE SURGERY      Eyelid    KNEE SURGERY      left knee    periodontal  03/2018    PROSTATE BIOPSY      TONSILLECTOMY      TRANSPOSITION-ULNAR NERVE Right 6/28/2016    [facility] at [facility] OR       Family History   Problem Relation Age of Onset    Prostate cancer Father     Cancer Father         prostate cancer    Heart disease Neg Hx         in first degree relatives       Social History     Socioeconomic History    Marital status:      Spouse name: None    Number of children: None    Years of education: None    Highest education level: None   Social Needs    Financial resource strain: None    Food insecurity - worry: None    Food insecurity - inability: None    Transportation needs - medical: None    Transportation needs - non-medical: None   Occupational History    None   Tobacco Use    Smoking status: Never Smoker    Smokeless tobacco: Former User   Substance and Sexual Activity    Alcohol use: No    Drug use: No    Sexual activity: Yes     Partners: Female   Other Topics Concern    None   Social History Narrative    None

## 2019-03-04 NOTE — PROCEDURES
Large Joint Aspiration/Injection: R knee  Date/Time: 3/4/2019 9:03 AM  Performed by: Ken Amezcua III, MD  Authorized by: Ken Amezcua III, MD     Consent Done?:  Yes (Verbal)  Indications:  Pain  Timeout: Prior to procedure the correct patient, procedure, and site was verified      Location:  Knee  Site:  R knee  Prep: Patient was prepped and draped in usual sterile fashion    Needle size:  21 G  Medications:  40 mg triamcinolone acetonide 40 mg/mL  Patient tolerance:  Patient tolerated the procedure well with no immediate complications

## 2019-05-14 DIAGNOSIS — Z12.11 COLON CANCER SCREENING: ICD-10-CM

## 2019-11-19 ENCOUNTER — PATIENT OUTREACH (OUTPATIENT)
Dept: ADMINISTRATIVE | Facility: OTHER | Age: 64
End: 2019-11-19

## 2019-11-21 ENCOUNTER — OFFICE VISIT (OUTPATIENT)
Dept: ORTHOPEDICS | Facility: CLINIC | Age: 64
End: 2019-11-21
Payer: COMMERCIAL

## 2019-11-21 VITALS — WEIGHT: 231.25 LBS | HEIGHT: 74 IN | BODY MASS INDEX: 29.68 KG/M2

## 2019-11-21 DIAGNOSIS — M17.11 PRIMARY OSTEOARTHRITIS OF RIGHT KNEE: Primary | ICD-10-CM

## 2019-11-21 PROCEDURE — 99999 PR PBB SHADOW E&M-EST. PATIENT-LVL III: ICD-10-PCS | Mod: PBBFAC,,, | Performed by: ORTHOPAEDIC SURGERY

## 2019-11-21 PROCEDURE — 3008F BODY MASS INDEX DOCD: CPT | Mod: CPTII,S$GLB,, | Performed by: ORTHOPAEDIC SURGERY

## 2019-11-21 PROCEDURE — 99213 PR OFFICE/OUTPT VISIT, EST, LEVL III, 20-29 MIN: ICD-10-PCS | Mod: S$GLB,,, | Performed by: ORTHOPAEDIC SURGERY

## 2019-11-21 PROCEDURE — 99999 PR PBB SHADOW E&M-EST. PATIENT-LVL III: CPT | Mod: PBBFAC,,, | Performed by: ORTHOPAEDIC SURGERY

## 2019-11-21 PROCEDURE — 3008F PR BODY MASS INDEX (BMI) DOCUMENTED: ICD-10-PCS | Mod: CPTII,S$GLB,, | Performed by: ORTHOPAEDIC SURGERY

## 2019-11-21 PROCEDURE — 99213 OFFICE O/P EST LOW 20 MIN: CPT | Mod: S$GLB,,, | Performed by: ORTHOPAEDIC SURGERY

## 2019-11-21 NOTE — PROGRESS NOTES
Subjective:     HPI:   Frank Gay Jr. is a 64 y.o. male who presents For recheck of right knee valgus arthritis    he says on Sunday had an acute flare of his knee pain Grupo in progressively worse over time it is moderate to severe nature activity related and relieved by rest.  He is doing Aleve, over-the-counter topical agents, and taking a daily gelatin.      he would like to proceed with right total knee replacement.  He has done well with left knee replacement by Dr. Moreau in 2013 that knee is asymptomatic     to review:   Frank Gay Jr. is a 63 y.o. male who presents for evaluation of right lateral knee pain ongoing for many years.     He complains of activity-related lateral knee pain.  Moderate to severe in nature.  Activity related and relieved with rest.  No groin anterior thigh radicular pain or paresthesias.     He had a left total knee replacement by Dr. Welch in 2013. Had an uneventful postoperative course and he is happy with his results on that left-sided thinks it is doing well. He was on Coumadin postoperatively.     Currently takes ibuprofen about once a week.  He also tries palpable in oral and his wife give some unflavored Carrillo gelatin.  He had a right knee injection in February of 2017 which did help.  No recent therapy no braces no assistive devices.  He can walk a mi.  Limitations include prolonged standing and working.     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     Has a history of a DVT and PTE in the 1970s when he was ran over by a tractor.  He had a left open tibia fracture and a left femur fracture.  No history of infection with surgery. He was casted for the left tibia his ankle is now stiff and arthritic.  He has sleep apnea and has had surgery for that       Objective:   Exam:  Antalgic gait minimal limp favoring the right knee. 5-125 degree knee range of motion 10° valgus alignment which corrects to about 5° of  valgus.  He is tender to palpation at the  Medial lateral and patellofemoral joint line he has a large effusion.  He has 5 degree flexion contracture but no extensor lag.  Knee is stable to anterior-posterior varus and valgus stresses.     On the left knee has a well healed surgical incision motion is good and stability is good      Imaging:  Indication:  Right knee pain  Exam Ordered: Radiographs of the right knee include a standing anteroposterior view, a standing posterioanterior view, a lateral view in full flexion, and a sunrise view  Details of Examination: Todays exam shows evidence of joint space narrowing, osteophyte formation, and subchondral sclerosis, all consistent with degenerative arthritis of the knee.  No other significant findings are noted.  Impression:  Degenerative Arthritis, Right Knee     reviewed x-rays from March.  Patient has a grade 3 severe valgus knee arthritis with bone on bone lateral compartment changes      Assessment:     Primary osteoarthritis of right knee [M17.11]   severe valgus    History of DVT/PTE in the 1970s related to trauma with a left femur and tibia fracture  Left total knee 2013  Plan:       This patient has significant symptoms in their knee that are affecting their quality of life and daily activities.  They have tried non-operative treatment including analgesics, an exercise program, and activity modification, but the symptoms have persisted. I believe they make a good candidate for knee arthroplasty.     The risks and benefits of knee arthroplasty have been discussed with the patient which include, but are not limited to infections (including severe sequelae), potential component failure, fracture, DVT, pulmonary embolus, nerve palsy, poor range of motion, the possibility of bone grafting, persistent pain, wound healing complications, transfusions, medical complications and death.     Pre-operative planning will include the following:  A pre-surgical evaluation by  will be arranged.  Pre-op orders will be placed.  We will make arrangements with the operating room for proper time and staffing.  We will make Social Service arrangements for the patient.    Location: [place]       Implants:   Company: [employer]  System: [employer]    DVT prophylaxis: ASA 81mg x1 12hrs post op, Eliquis 2.5mg BID x1 month starting 24hrs post op  Dispo: outpatient PT     we will plan on a right total knee replacement December 18th      No orders of the defined types were placed in this encounter.      Past Medical History:   Diagnosis Date    Elevated PSA     Personal history of venous thrombosis and embolism     After trauma in the 70's, he had a DVT with a PE    Sleep apnea        Past Surgical History:   Procedure Laterality Date    ADENOIDECTOMY      ADENOIDECTOMY      APPENDECTOMY      ELBOW SURGERY  05/2016    EYE SURGERY      Eyelid    KNEE SURGERY      left knee    periodontal  03/2018    PROSTATE BIOPSY      TONSILLECTOMY         Family History   Problem Relation Age of Onset    Prostate cancer Father     Cancer Father         prostate cancer    Heart disease Neg Hx         in first degree relatives       Social History     Socioeconomic History    Marital status:      Spouse name: Not on file    Number of children: Not on file    Years of education: Not on file    Highest education level: Not on file   Occupational History    Not on file   Social Needs    Financial resource strain: Not on file    Food insecurity:     Worry: Not on file     Inability: Not on file    Transportation needs:     Medical: Not on file     Non-medical: Not on file   Tobacco Use    Smoking status: Never Smoker    Smokeless tobacco: Former User   Substance and Sexual Activity    Alcohol use: No    Drug use: No    Sexual activity: Yes     Partners: Female   Lifestyle    Physical activity:     Days per week: Not on file     Minutes per session: Not on file    Stress: Not on file    Relationships    Social connections:     Talks on phone: Not on file     Gets together: Not on file     Attends [employer] service: Not on file     Active member of club or organization: Not on file     Attends meetings of clubs or organizations: Not on file     Relationship status: Not on file   Other Topics Concern    Not on file   Social History Narrative    Not on file

## 2019-11-22 ENCOUNTER — TELEPHONE (OUTPATIENT)
Dept: ORTHOPEDICS | Facility: CLINIC | Age: 64
End: 2019-11-22

## 2019-11-22 ENCOUNTER — TELEPHONE (OUTPATIENT)
Dept: PREADMISSION TESTING | Facility: HOSPITAL | Age: 64
End: 2019-11-22

## 2019-11-22 DIAGNOSIS — Z01.818 PRE-OP TESTING: Primary | ICD-10-CM

## 2019-11-22 DIAGNOSIS — M17.11 PRIMARY OSTEOARTHRITIS OF RIGHT KNEE: Primary | ICD-10-CM

## 2019-11-22 DIAGNOSIS — M79.604 PAIN OF RIGHT LOWER EXTREMITY: ICD-10-CM

## 2019-11-22 NOTE — TELEPHONE ENCOUNTER
----- Message from Freda Valles LPN sent at 11/22/2019 12:16 PM CST -----  Surgery 12/18    Patient need CBC,BMP,PT/INR ,POC AND OPOC

## 2019-11-22 NOTE — TELEPHONE ENCOUNTER
Spoke with pt. Informed pt we will send external order to [facility] in [place]. Pt gave verbal understanding. ----- Message from Amy Raman RN sent at 11/22/2019  1:33 PM CST -----  Let the pt know we will send external order to [facility] in [place]. I will do it now.      Thanks!  ----- Message -----  From: Yadira Mckeon MA  Sent: 11/22/2019   1:32 PM CST  To: MATTHIAS Juarez I know you place the PT referral in they are wanting to go to this specific place.  ----- Message -----  From: Herminio Jamison  Sent: 11/22/2019  12:43 PM CST  To: [facility]    Good Afternoon,     This [facility] to [facility] at [facility] [place]... Phone number [phone]. Can you please send orders there?    Thanks!

## 2019-11-25 ENCOUNTER — PATIENT MESSAGE (OUTPATIENT)
Dept: SURGERY | Facility: HOSPITAL | Age: 64
End: 2019-11-25

## 2019-11-25 ENCOUNTER — PATIENT MESSAGE (OUTPATIENT)
Dept: INTERNAL MEDICINE | Facility: CLINIC | Age: 64
End: 2019-11-25

## 2019-11-25 ENCOUNTER — TELEPHONE (OUTPATIENT)
Dept: ORTHOPEDICS | Facility: CLINIC | Age: 64
End: 2019-11-25

## 2019-11-25 NOTE — TELEPHONE ENCOUNTER
Called pt was not able to leave vm will give pt a call again. ----- Message from Lily Espinosa sent at 11/25/2019  2:56 PM CST -----  Contact: pt  Pt would like to be called back regarding his pre- op appts    Pt can be reached at [phone]

## 2019-11-25 NOTE — TELEPHONE ENCOUNTER
Spoke with pt wife. Pt wife will call Dr. Jones's office to get appt's scheduled on 12/10 moved to 12/5.

## 2019-11-26 ENCOUNTER — TELEPHONE (OUTPATIENT)
Dept: PREADMISSION TESTING | Facility: HOSPITAL | Age: 64
End: 2019-11-26

## 2019-11-26 DIAGNOSIS — M25.561 RIGHT KNEE PAIN, UNSPECIFIED CHRONICITY: Primary | ICD-10-CM

## 2019-11-26 NOTE — TELEPHONE ENCOUNTER
----- Message from Lissette Hagen LPN sent at 11/26/2019  8:23 AM CST -----  Pt would like to r/s appts to 12/13    Thanks

## 2019-12-02 ENCOUNTER — PATIENT MESSAGE (OUTPATIENT)
Dept: SURGERY | Facility: HOSPITAL | Age: 64
End: 2019-12-02

## 2019-12-03 ENCOUNTER — PATIENT OUTREACH (OUTPATIENT)
Dept: ADMINISTRATIVE | Facility: OTHER | Age: 64
End: 2019-12-03

## 2019-12-04 ENCOUNTER — PATIENT MESSAGE (OUTPATIENT)
Dept: SURGERY | Facility: HOSPITAL | Age: 64
End: 2019-12-04

## 2019-12-05 ENCOUNTER — OFFICE VISIT (OUTPATIENT)
Dept: ORTHOPEDICS | Facility: CLINIC | Age: 64
End: 2019-12-05
Payer: COMMERCIAL

## 2019-12-05 ENCOUNTER — HOSPITAL ENCOUNTER (OUTPATIENT)
Dept: RADIOLOGY | Facility: HOSPITAL | Age: 64
Discharge: HOME | End: 2019-12-05
Attending: PHYSICIAN ASSISTANT
Payer: COMMERCIAL

## 2019-12-05 VITALS
TEMPERATURE: 97 F | WEIGHT: 224.75 LBS | HEART RATE: 47 BPM | HEIGHT: 74 IN | BODY MASS INDEX: 28.84 KG/M2 | SYSTOLIC BLOOD PRESSURE: 127 MMHG | DIASTOLIC BLOOD PRESSURE: 60 MMHG

## 2019-12-05 DIAGNOSIS — M17.11 PRIMARY OSTEOARTHRITIS OF RIGHT KNEE: Primary | ICD-10-CM

## 2019-12-05 DIAGNOSIS — M25.561 RIGHT KNEE PAIN, UNSPECIFIED CHRONICITY: ICD-10-CM

## 2019-12-05 PROCEDURE — 99499 UNLISTED E&M SERVICE: CPT | Mod: S$GLB,,, | Performed by: PHYSICIAN ASSISTANT

## 2019-12-05 PROCEDURE — 73560 X-RAY EXAM OF KNEE 1 OR 2: CPT | Mod: 26,RT,, | Performed by: RADIOLOGY

## 2019-12-05 PROCEDURE — 73560 X-RAY EXAM OF KNEE 1 OR 2: CPT | Mod: TC,RT

## 2019-12-05 PROCEDURE — 73560 XR KNEE 1 OR 2 VIEW RIGHT: ICD-10-PCS | Mod: 26,RT,, | Performed by: RADIOLOGY

## 2019-12-05 PROCEDURE — 99499 NO LOS: ICD-10-PCS | Mod: S$GLB,,, | Performed by: PHYSICIAN ASSISTANT

## 2019-12-05 PROCEDURE — 99999 PR PBB SHADOW E&M-EST. PATIENT-LVL IV: CPT | Mod: PBBFAC,,, | Performed by: PHYSICIAN ASSISTANT

## 2019-12-05 PROCEDURE — 99999 PR PBB SHADOW E&M-EST. PATIENT-LVL IV: ICD-10-PCS | Mod: PBBFAC,,, | Performed by: PHYSICIAN ASSISTANT

## 2019-12-05 RX ORDER — NALOXONE HCL 0.4 MG/ML
0.02 VIAL (ML) INJECTION
Status: CANCELLED | OUTPATIENT
Start: 2019-12-05

## 2019-12-05 RX ORDER — AMOXICILLIN 250 MG
1 CAPSULE ORAL 2 TIMES DAILY
Status: CANCELLED | OUTPATIENT
Start: 2019-12-05

## 2019-12-05 RX ORDER — ROPIVACAINE HYDROCHLORIDE 2 MG/ML
8 INJECTION, SOLUTION EPIDURAL; INFILTRATION; PERINEURAL CONTINUOUS
Status: CANCELLED | OUTPATIENT
Start: 2019-12-05

## 2019-12-05 RX ORDER — ASPIRIN 81 MG/1
81 TABLET ORAL 2 TIMES DAILY
Status: CANCELLED | OUTPATIENT
Start: 2019-12-05

## 2019-12-05 RX ORDER — ONDANSETRON 2 MG/ML
4 INJECTION INTRAMUSCULAR; INTRAVENOUS EVERY 8 HOURS PRN
Status: CANCELLED | OUTPATIENT
Start: 2019-12-05

## 2019-12-05 RX ORDER — OXYCODONE HYDROCHLORIDE 5 MG/1
10 TABLET ORAL
Status: CANCELLED | OUTPATIENT
Start: 2019-12-05

## 2019-12-05 RX ORDER — MUPIROCIN 20 MG/G
1 OINTMENT TOPICAL
Status: CANCELLED | OUTPATIENT
Start: 2019-12-05

## 2019-12-05 RX ORDER — ASPIRIN 81 MG/1
81 TABLET ORAL DAILY
COMMUNITY
End: 2020-08-13

## 2019-12-05 RX ORDER — SODIUM CHLORIDE 9 MG/ML
INJECTION, SOLUTION INTRAVENOUS CONTINUOUS
Status: CANCELLED | OUTPATIENT
Start: 2019-12-05 | End: 2019-12-06

## 2019-12-05 RX ORDER — ACETAMINOPHEN 500 MG
1000 TABLET ORAL EVERY 6 HOURS
Status: CANCELLED | OUTPATIENT
Start: 2019-12-05 | End: 2019-12-07

## 2019-12-05 RX ORDER — SODIUM CHLORIDE 9 MG/ML
INJECTION, SOLUTION INTRAVENOUS
Status: CANCELLED | OUTPATIENT
Start: 2019-12-05

## 2019-12-05 RX ORDER — SODIUM CHLORIDE 0.9 % (FLUSH) 0.9 %
10 SYRINGE (ML) INJECTION
Status: CANCELLED | OUTPATIENT
Start: 2019-12-05

## 2019-12-05 RX ORDER — FENTANYL CITRATE 50 UG/ML
25 INJECTION, SOLUTION INTRAMUSCULAR; INTRAVENOUS EVERY 5 MIN PRN
Status: CANCELLED | OUTPATIENT
Start: 2019-12-05

## 2019-12-05 RX ORDER — MUPIROCIN 20 MG/G
1 OINTMENT TOPICAL 2 TIMES DAILY
Status: CANCELLED | OUTPATIENT
Start: 2019-12-05 | End: 2019-12-10

## 2019-12-05 RX ORDER — OXYCODONE HYDROCHLORIDE 5 MG/1
5 TABLET ORAL
Status: CANCELLED | OUTPATIENT
Start: 2019-12-05

## 2019-12-05 RX ORDER — PREGABALIN 25 MG/1
75 CAPSULE ORAL
Status: CANCELLED | OUTPATIENT
Start: 2019-12-05

## 2019-12-05 RX ORDER — FAMOTIDINE 20 MG/1
20 TABLET, FILM COATED ORAL 2 TIMES DAILY
Status: CANCELLED | OUTPATIENT
Start: 2019-12-05

## 2019-12-05 RX ORDER — DEXTROMETHORPHAN HYDROBROMIDE, GUAIFENESIN 5; 100 MG/5ML; MG/5ML
650 LIQUID ORAL 2 TIMES DAILY PRN
COMMUNITY
End: 2020-08-13

## 2019-12-05 RX ORDER — MIDAZOLAM HYDROCHLORIDE 1 MG/ML
1 INJECTION INTRAMUSCULAR; INTRAVENOUS EVERY 5 MIN PRN
Status: CANCELLED | OUTPATIENT
Start: 2019-12-05

## 2019-12-05 RX ORDER — MORPHINE SULFATE 10 MG/ML
2 INJECTION, SOLUTION INTRAMUSCULAR; INTRAVENOUS
Status: CANCELLED | OUTPATIENT
Start: 2019-12-05

## 2019-12-05 RX ORDER — CELECOXIB 100 MG/1
400 CAPSULE ORAL
Status: CANCELLED | OUTPATIENT
Start: 2019-12-05

## 2019-12-05 RX ORDER — LIDOCAINE HYDROCHLORIDE 10 MG/ML
1 INJECTION, SOLUTION EPIDURAL; INFILTRATION; INTRACAUDAL; PERINEURAL
Status: CANCELLED | OUTPATIENT
Start: 2019-12-05

## 2019-12-05 RX ORDER — POLYETHYLENE GLYCOL 3350 17 G/17G
17 POWDER, FOR SOLUTION ORAL DAILY
Status: CANCELLED | OUTPATIENT
Start: 2019-12-05

## 2019-12-05 RX ORDER — CHOLECALCIFEROL (VITAMIN D3) 125 MCG
1 CAPSULE ORAL
COMMUNITY
End: 2020-08-13

## 2019-12-05 RX ORDER — BISACODYL 10 MG
10 SUPPOSITORY, RECTAL RECTAL EVERY 12 HOURS PRN
Status: CANCELLED | OUTPATIENT
Start: 2019-12-05

## 2019-12-05 RX ORDER — PREGABALIN 25 MG/1
75 CAPSULE ORAL NIGHTLY
Status: CANCELLED | OUTPATIENT
Start: 2019-12-05

## 2019-12-05 RX ORDER — PNV NO.95/FERROUS FUM/FOLIC AC 28MG-0.8MG
100 TABLET ORAL DAILY
COMMUNITY
End: 2020-08-13

## 2019-12-05 RX ORDER — CELECOXIB 100 MG/1
200 CAPSULE ORAL DAILY
Status: CANCELLED | OUTPATIENT
Start: 2019-12-05

## 2019-12-05 RX ORDER — TALC
6 POWDER (GRAM) TOPICAL NIGHTLY PRN
Status: CANCELLED | OUTPATIENT
Start: 2019-12-05

## 2019-12-05 NOTE — PROGRESS NOTES
Frank Gay Jr. is a 64 y.o. year old here today for a pre-operative visit in preparation for a Right total knee arthroplasty to be performed by  Dr. Amezcua on 12/18/2019.  he was last seen and treated in the clinic on 11/21/2019. he will be medically optimized by the pre op center. There has been no significant change in medical status since last visit. No fever, chills, malaise, or unexplained weight change.      Allergies, Medications, past medical and surgical history reviewed.    Focused examination performed.    Patient declined to see Dr. Amezcua today in clinic.   All questions answered. Patient encouraged to call with questions. Contact information given.     Pre, taj, and post operative procedures and expectations discussed. Questions were answered. Frank Gay Jr. has been educated and is ready to proceed with surgery. Approximately 30 minutes was spent discussing surgical outcomes, plans, procedures pre, taj, and post operative expections and care.  Surgical consent signed.    Frank Gay JrJanice will contact us if there are any questions, concerns, or changes in medical status prior to surgery.

## 2019-12-05 NOTE — H&P
CC: Right knee pain    Frank Gay Jr. is a 64 y.o. male with 2 year history of Right knee pain. Pain is worse with activity and weight bearing.  Patient has experienced interference of activities of daily living due to decreased range of motion and an increase in joint pain and swelling.  Patient has failed non-operative treatment including NSAIDs, corticosteroid injections, and activity modification.  Frank Gay Jr. currently ambulates using a cane. Patient had L TKA in 2013 by Dr. Moreau and did well.     Relevant medical conditions of significance in perioperative period:  DVT/PE after trauma in the 1970s  LILIAN: cannot tolerate CPAP, had surgery which helped somewhat     Past Medical History:   Diagnosis Date    Elevated PSA     Personal history of venous thrombosis and embolism     After trauma in the 70's, he had a DVT with a PE    Sleep apnea        Past Surgical History:   Procedure Laterality Date    ADENOIDECTOMY      ADENOIDECTOMY      APPENDECTOMY      ELBOW SURGERY  05/2016    EYE SURGERY      Eyelid    KNEE SURGERY      left knee    periodontal  03/2018    PROSTATE BIOPSY      TONSILLECTOMY         Family History   Problem Relation Age of Onset    Prostate cancer Father     Cancer Father         prostate cancer    Heart disease Neg Hx         in first degree relatives       Review of patient's allergies indicates:   Allergen Reactions    Dairy aid  [lactase]      Other reaction(s): stomach cramps  Other reaction(s): Itching  Other reaction(s): Diarrhea    Influenza a (h1n1) vac 09 (pf)     Lactose          Current Outpatient Medications:     acetaminophen (TYLENOL) 650 MG TbSR, Take 650 mg by mouth 2 (two) times daily as needed., Disp: , Rfl:     aspirin (ECOTRIN) 81 MG EC tablet, Take 81 mg by mouth once daily., Disp: , Rfl:     cyanocobalamin (VITAMIN B-12) 100 MCG tablet, Take 100 mcg by mouth once daily., Disp: , Rfl:     lactase (LACTAID) 3,000 unit tablet, Take 1  "tablet by mouth 3 (three) times daily with meals., Disp: , Rfl:     amoxicillin-clavulanate 875-125mg (AUGMENTIN) 875-125 mg per tablet, Take 1 tablet by mouth every 12 (twelve) hours., Disp: , Rfl:     ergocalciferol (ERGOCALCIFEROL) 50,000 unit Cap, Take 1 capsule (50,000 Units total) by mouth every 7 days., Disp: 4 capsule, Rfl: 0    fluticasone (FLONASE) 50 mcg/actuation nasal spray, Use 2 sprays to each nostril daily, Disp: 16 g, Rfl: 1    ibuprofen (ADVIL,MOTRIN) 200 MG tablet, Take 400 mg by mouth every 6 (six) hours as needed for Pain., Disp: , Rfl:     Review of Systems:  Constitutional: no fever or chills  Eyes: no visual changes  ENT: no nasal congestion or sore throat  Respiratory: no cough or shortness of breath  Cardiovascular: no chest pain or palpitations  Gastrointestinal: no nausea or vomiting, tolerating diet  Genitourinary: no hematuria or dysuria  Integument/Breast: no rash or pruritis  Hematologic/Lymphatic: no easy bruising or lymphadenopathy  Musculoskeletal: positive for knee pain  Neurological: no seizures or tremors  Behavioral/Psych: no auditory or visual hallucinations  Endocrine: no heat or cold intolerance    PE:  /60 (BP Location: Left arm, Patient Position: Sitting, BP Method: Medium (Automatic))   Pulse (!) 47   Temp 97.2 °F (36.2 °C) (Oral)   Ht 6' 2" (1.88 m)   Wt 101.9 kg (224 lb 12.1 oz)   BMI 28.86 kg/m²   General: Pleasant, cooperative, NAD   Gait: antalgic  HEENT: NCAT, sclera nonicteric   Lungs: Respirations clear bilaterally; equal and unlabored.   CV: S1S2; 2+ bilateral upper and lower extremity pulses.   Skin: Intact throughout with no rashes, erythema, or lesions  Extremities: No LE edema,  no erythema or warmth of the skin in either lower extremity.    Right knee exam:  Knee Range of Motion:5-125 active, pain with passive range of motion  Effusion:none  Condition of skin:intact  Location of tenderness:Lateral joint line   Strength:5 of 5 quadriceps " strength and 5 of 5 hamstring strength  Stability: stable to testing    Hip Examination:full painless range of motion, without tenderness    Radiographs: Radiographs reveal advanced degenerative changes including subchondral cyst formation, subchondral sclerosis, osteophyte formation, joint space narrowing.     Knee Alignment:  10 degrees valgus    Diagnosis: osteoarthritis Right knee    Plan: Right total knee arthroplasty    Due to the serious nature of total joint infection and the high prevalence of community acquired MRSA, vancomycin will be used perioperatively.

## 2019-12-12 ENCOUNTER — TELEPHONE (OUTPATIENT)
Dept: ORTHOPEDICS | Facility: CLINIC | Age: 64
End: 2019-12-12

## 2019-12-12 NOTE — PRE-PROCEDURE INSTRUCTIONS
Chart review, triage plan initiated. Phone contact-pt reminded of appointments in POC 12/13/19 and instructed to stop vitamins, herbal supplements, and NSAIDs now until after surgery.
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
## 2019-12-12 NOTE — TELEPHONE ENCOUNTER
Spoke to pt, he is scheduled for his OP PT initial eval on 12/20 at a [facility] location.undertsanding verbalized.

## 2019-12-13 ENCOUNTER — HOSPITAL ENCOUNTER (OUTPATIENT)
Dept: PREADMISSION TESTING | Facility: HOSPITAL | Age: 64
Discharge: HOME | End: 2019-12-13
Attending: ANESTHESIOLOGY
Payer: COMMERCIAL

## 2019-12-13 ENCOUNTER — INITIAL CONSULT (OUTPATIENT)
Dept: INTERNAL MEDICINE | Facility: CLINIC | Age: 64
End: 2019-12-13
Payer: COMMERCIAL

## 2019-12-13 ENCOUNTER — ANESTHESIA EVENT (OUTPATIENT)
Dept: SURGERY | Facility: HOSPITAL | Age: 64
DRG: 470 | End: 2019-12-13
Payer: COMMERCIAL

## 2019-12-13 ENCOUNTER — HOSPITAL ENCOUNTER (OUTPATIENT)
Dept: CARDIOLOGY | Facility: CLINIC | Age: 64
Discharge: HOME | End: 2019-12-13
Payer: COMMERCIAL

## 2019-12-13 VITALS
HEART RATE: 57 BPM | HEIGHT: 74 IN | BODY MASS INDEX: 29.13 KG/M2 | RESPIRATION RATE: 16 BRPM | OXYGEN SATURATION: 98 % | TEMPERATURE: 98 F | SYSTOLIC BLOOD PRESSURE: 136 MMHG | WEIGHT: 227 LBS | DIASTOLIC BLOOD PRESSURE: 63 MMHG

## 2019-12-13 DIAGNOSIS — R60.9 EDEMA, UNSPECIFIED TYPE: ICD-10-CM

## 2019-12-13 DIAGNOSIS — S99.912D INJURY OF LEFT ANKLE, SUBSEQUENT ENCOUNTER: ICD-10-CM

## 2019-12-13 DIAGNOSIS — Z79.02 LONG TERM CURRENT USE OF ANTITHROMBOTICS/ANTIPLATELETS: ICD-10-CM

## 2019-12-13 DIAGNOSIS — Z96.652 STATUS POST LEFT KNEE REPLACEMENT: ICD-10-CM

## 2019-12-13 DIAGNOSIS — R00.1 SINUS BRADYCARDIA: ICD-10-CM

## 2019-12-13 DIAGNOSIS — G47.33 OBSTRUCTIVE SLEEP APNEA: Primary | Chronic | ICD-10-CM

## 2019-12-13 DIAGNOSIS — R97.20 ELEVATED PSA: ICD-10-CM

## 2019-12-13 DIAGNOSIS — G47.33 OBSTRUCTIVE SLEEP APNEA: Chronic | ICD-10-CM

## 2019-12-13 DIAGNOSIS — G56.21 CUBITAL TUNNEL SYNDROME ON RIGHT: ICD-10-CM

## 2019-12-13 DIAGNOSIS — Z01.818 PREOP EXAMINATION: Primary | ICD-10-CM

## 2019-12-13 DIAGNOSIS — E78.5 HYPERLIPIDEMIA, UNSPECIFIED HYPERLIPIDEMIA TYPE: ICD-10-CM

## 2019-12-13 DIAGNOSIS — D64.9 ANEMIA, UNSPECIFIED TYPE: ICD-10-CM

## 2019-12-13 DIAGNOSIS — M19.072 ARTHRITIS OF ANKLE, LEFT: Chronic | ICD-10-CM

## 2019-12-13 DIAGNOSIS — R21 RASH: ICD-10-CM

## 2019-12-13 DIAGNOSIS — Z86.718 HISTORY OF THROMBOSIS: ICD-10-CM

## 2019-12-13 PROBLEM — S99.912A LEFT ANKLE INJURY: Status: ACTIVE | Noted: 2019-12-13

## 2019-12-13 PROCEDURE — 99214 PR OFFICE/OUTPT VISIT, EST, LEVL IV, 30-39 MIN: ICD-10-PCS | Mod: S$GLB,,, | Performed by: HOSPITALIST

## 2019-12-13 PROCEDURE — 99999 PR PBB SHADOW E&M-EST. PATIENT-LVL II: CPT | Mod: PBBFAC,,, | Performed by: HOSPITALIST

## 2019-12-13 PROCEDURE — 99214 OFFICE O/P EST MOD 30 MIN: CPT | Mod: S$GLB,,, | Performed by: HOSPITALIST

## 2019-12-13 PROCEDURE — 93005 ELECTROCARDIOGRAM TRACING: CPT | Mod: S$GLB,,, | Performed by: ANESTHESIOLOGY

## 2019-12-13 PROCEDURE — 93010 ELECTROCARDIOGRAM REPORT: CPT | Mod: S$GLB,,, | Performed by: INTERNAL MEDICINE

## 2019-12-13 PROCEDURE — 93005 EKG 12-LEAD: ICD-10-PCS | Mod: S$GLB,,, | Performed by: ANESTHESIOLOGY

## 2019-12-13 PROCEDURE — 93010 EKG 12-LEAD: ICD-10-PCS | Mod: S$GLB,,, | Performed by: INTERNAL MEDICINE

## 2019-12-13 PROCEDURE — 99999 PR PBB SHADOW E&M-EST. PATIENT-LVL II: ICD-10-PCS | Mod: PBBFAC,,, | Performed by: HOSPITALIST

## 2019-12-13 RX ORDER — MAG HYDROX/ALUMINUM HYD/SIMETH 200-200-20
SUSPENSION, ORAL (FINAL DOSE FORM) ORAL
COMMUNITY
End: 2020-08-13

## 2019-12-13 NOTE — ASSESSMENT & PLAN NOTE
Had surgery   Possible UPPP   No uvula on exam   Not tolerating CPAP   Wife reports occasional loud snoring - better lately with weight loss  Occasional apnea       sleep apnea- I suggested follow up  and suggest caution with usage of medication that can cause respiratory suppression in the perioperative period    potential ramifications of untreated sleep apnea, which could include daytime sleepiness, hypertension, heart disease and stroke were discussed    Avoidance of  supine sleep, weight gain , alcoholic beverages , care with , sedative , CNS depressant use indicated  since all of these can worsen LILIAN     History suggests that his  Sleep apnea may have improved , but still seems to  be having some sleep apnea     Care with sedating Medication suggested taj op

## 2019-12-13 NOTE — ASSESSMENT & PLAN NOTE
Edema- I suggested avoidance of added salt,avoidance of NSAID's, unless advised or ordered  and suggested Limb elevation and pipe hose use

## 2019-12-13 NOTE — ASSESSMENT & PLAN NOTE
Pulmonary embolism   Had hypoxia , hallucination   Age 22 years - 1996   Was trying  to get on a moving tractor , when he slipped on the step ( [employer] ) and tractor rolled over the Left leg, Pelvis   Had crush injury     He had a left open tibia fracture and a left femur fracture  Was hospitalized for 7.5 weeks   Was in traction   Was casted   Did not require surgery   Was managed conservatively     History of PE  Episode- 1  Associated with reduced mobility,no  long journeys, no cancer, no prior surgery, Hospital stay, no HRT  As per the description , had a procedure through Rt elbow- filter / ? Lytic therapy - no longer has filter   Had to be in ICU for 1 weeks   Left  ankle is  stiff and arthritic.     Increased risk of thrombosis in the taj operative period

## 2019-12-13 NOTE — DISCHARGE INSTRUCTIONS
Your surgery has been scheduled for:__________________________________________    You should report to:  ____[facility], located on the [place] side of the first floor of the           [facility] ([phone])  ____[facility], located on the [facility] side of the            Second floor of the [facility] ([phone])  ____3rd Floor [facility] located on the [facility] side of the [facility] [phone]  ____[facility]: located at [address] [address]. Check in on the first floor of the hospital.  Please Note   - Tell your doctor if you take Aspirin, products containing Aspirin, herbal medications  or blood thinners, such as Coumadin, Ticlid, or Plavix.  (Consult your provider regarding holding or stopping before surgery).  - Arrange for someone to drive you home following surgery.  You will not be allowed to leave the surgical facility alone or drive yourself home following sedation and anesthesia.  Before Surgery  - Stop taking all herbal medications 14 days prior to surgery  - No Motrin/Advil (Ibuprofen)  1 day before surgery  - No Aleve (Naproxen) 7 days before surgery  - Stop Taking Asprin, products containing Asprin _____days before surgery  - Stop taking blood thinners_______days before surgery  - No Goody's/BC  Powder 7 days before surgery  - Refrain from drinking alcoholic beverages for 24hours before and after surgery  - Stop or limit smoking _________days before surgery  - You may take Tylenol for pain  Night before Surgery  - Do not eat or drink after midnight  - Take a shower or bath (shower is recommended).  Bathe with Hibiclens soap or an antibacterial soap from the neck down.  If not supplied by your surgeon, hibiclens soap will need to be purchased over the counter in pharmacy.  Rinse soap off thoroughly.  - Shampoo your hair with your regular  shampoo  The Day of Surgery  - Take another bath or shower with hibiclens or any antibacterial soap, to reduce the chance of infection.  - Take heart and blood pressure medications with a small sip of water, as advised by the perioperative team.  - Do not take fluid pills  - You may brush your teeth and rinse your mouth, but do not swall any additional water.   - Do not apply perfumes, powder, body lotions or deodorant on the day of surgery.  - Nail polish should be removed.  - Do not wear makeup or moisturizer  - Wear comfortable clothes, such as a button front shirt and loose fitting pants.  - Leave all jewelry, including body piercings, and valuables at home.    - Bring any devices you will neeed after surgery such as crutches or canes.  - If you have sleep apnea, please bring your CPAP machine  In the event that your physical condition changes including the onset of a cold or respiratory illness, or if you have to delay or cancel your surgery, please notify your surgeon.   Anesthesia: Regional Anesthesia    Youre scheduled for surgery. During surgery, youll receive medicine called anesthesia to keep you comfortable and pain-free. Your surgeon has decided that youll receive regional anesthesia. This sheet tells you what to expect with this type of anesthesia.  What is regional anesthesia?  Regional anesthesia numbs one region of your body. The anesthesia may be given around nerves or into veins in your arms, neck, or legs (nerve block or Ivan block). Or it may be sent into the spinal fluid (spinal anesthesia) or into the space just outside the spinal fluid (epidural anesthesia). You may also be given sedatives to help you relax.  Nerve block or Joy block  A small area of the body, such as an arm or leg, can be numbed using a nerve block or Ivan block.  · Nerve block. During a nerve block, your skin is numbed. A needle is then inserted near nerves that serve the area to be numbed. Anesthetic is sent through  the needle.  · IV regional or Fajardo block. For this type of block, an IV line is put into a vein. The blood flow to the area to be numbed is blocked for a short time. Anesthetic is sent through the IV.  Spinal anesthesia  Spinal anesthesia numbs your body from about the waist down.  · Anesthetic is injected into the spinal fluid. This is a substance that surrounds the spinal cord in your spinal column. The anesthetic blocks pain traveling from the body to the brain.  · To receive the anesthetic, your skin is numbed at the injection site on your back.  · A needle is then inserted into the spinal space. Anesthetic is sent into the spinal fluid through the needle.  Epidural anesthesia  Epidural anesthesia is most commonly used during childbirth and may also be used after surgical procedures of the chest, belly, and legs.  · Anesthetic is injected into the epidural space. This is just outside the dural sac which contains the spinal fluid.  · To receive the anesthetic, your skin is numbed at the injection site on your back.  · A needle is then inserted into the epidural space. Anesthetic is sent into the epidural space through the needle.  · A small flexible catheter may be attached to the needle and left in place. This allows for continuous injections or infusions of anesthetic.  Anesthesia tools and medicines that might be near you during your procedure  · Local anesthetic. This medicine is given through a needle numbs one region of your body.  · Electrocardiography leads (electrodes). These are used to record your heart rate and rhythm.  · Blood pressure cuff. A cuff is placed on your arm to keep track of your blood pressure.  · Pulse oximeter. This small clip is placed on the end of the finger. It measures your blood oxygen level.  · Sedatives. These medicines may be given through an IV. They help to relax you and keep you comfortable. You may stay awake or sleep lightly.  · Oxygen. You may be given oxygen through a  facemask.  Risks and possible complications  Regional anesthesia carries some risks. These include:  · Nausea and vomiting  · Headache  · Backache  · Decreased blood pressure  · Allergic reaction to the anesthetic  · Ongoing numbness (rare)  · Irregular heartbeat (rare)  · Cardiac arrest (rare)   Date Last Reviewed: 12/1/2016  © 0079-0786 [employer] [address] [place], [place] [id number]. All rights reserved. This information is not intended as a substitute for professional medical care. Always follow your healthcare professional's instructions.

## 2019-12-13 NOTE — ASSESSMENT & PLAN NOTE
Rt lower leg - 30 years   Has eczema  Itching   Uses Cortisone as needed   No cellulitis history   No recent problem  On exam- no warmth, no pain , No suggestion of active infection  Messaged surgeon

## 2019-12-13 NOTE — OUTPATIENT SUBJECTIVE & OBJECTIVE
"Outpatient Subjective & Objective     Chief complaint-Preoperative evaluation, Perioperative Medical management, complication reduction plan     Active cardiac conditions- none    Revised cardiac risk index predictors- none    Functional capacity -Examples of physical activity, [place] farm, feeds animals,    can take 1 flight of stairs----- He can undertake all the above activities without  chest pain,chest tightness, Shortness of breath ,dizziness,lightheadedness making his exercise tolerance more, than 4 Mets.       Review of Systems   Constitutional: Negative for chills and fever.          Weight loss-with portion control    HENT:        Sleep apnea    Eyes:        No unusual vision changes   Respiratory:        No cough , phlegm    No Hemoptysis   Cardiovascular:        As noted   Gastrointestinal:        Bowels- Regular   No overt GI/ blood losses   Endocrine:        Prednisone use > 20 mg daily for 3 weeks- None    Genitourinary: Negative for dysuria.        No urinary hesitancy    Musculoskeletal:        As above      Skin: Positive for rash.        Rt Ankle - 30 years   Has eczema  Itching   Uses Cortisone as needed   No cellulitis history    Neurological: Negative for syncope.        No unilateral weakness   Hematological:        Current use of Anticoagulants  None    Psychiatric/Behavioral:        No Depression,Anxiety       No Coronary , Peripheral artery stenting           No anesthesia, bleeding, cardiac problems , PONV with previous surgeries/procedures.  Medications and Allergies reviewed in epic.     FH- No anesthesia,bleeding / venous thrombosis ,  in family     Physical Exam     Vitals - 1 value per visit 12/13/2019   SYSTOLIC 136   DIASTOLIC 63   PULSE 57   TEMPERATURE 98.3   RESPIRATIONS 16   SPO2 98   Weight (lb) 227   Weight (kg) 102.967   HEIGHT 6' 2"   BODY MASS INDEX 29.15         Physical Exam  Constitutional- General appearance-Conscious,Coherent  Eyes- No conjunctival icterus,pupils "  round  and reactive to light   ENT-Oral cavity- moist  , Hearing grossly normal   Neck- No thyromegaly ,Trachea -central, No jugular venous distension,   No Carotid Bruit   Cardiovascular -Heart Sounds- Normal  and  no murmur   , No gallop rhythm   Respiratory - Normal Respiratory Effort, Normal breath sounds,  no wheeze  and  no forced expiratory wheeze    Peripheral pitting pedal edema LLE > RLE--LLE and  mild, no calf pain   Gastrointestinal -Soft abdomen, No palpable masses, Non Tender,Liver,Spleen not palpable. No-- free fluid and shifting dullness  Musculoskeletal- No finger Clubbing. Strength grossly normal   Lymphatic-No Palpable cervical, axillary,Inguinal lymphadenopathy   Psychiatric - normal effect,Orientation  Rt Dorsalis pedis pulses-palpable    Lt Dorsalis pedis pulses- palpable   Rt Posterior tibial pulses -palpable   Left posterior tibial pulses -palpable   Miscellaneous -  no asterixis and  no renal bruit   Left ankle swelling - chronic appearing  Rt lower extremity rash - no infection       Investigations  Lab and Imaging have been reviewed in epic.    Review of Medicine tests  MARCH 2018     No hemodynamically significant carotid stenosis    EKG- I had independently reviewed the EKG from--12/13/2019   It was reported to be showing     Sinus bradycardia  Nonspecific T wave abnormality  Abnormal ECG  When compared with ECG of 28-JUN-2016 09:32,  Nonspecific T wave abnormality now evident in Lateral leads    Review of clinical lab tests:  Lab Results   Component Value Date    CREATININE 0.8 12/05/2019    HGB 13.3 (L) 12/05/2019     12/05/2019         Review of old records- Was done and information gathered regards to events leading to surgery and health conditions of significance in the perioperative period.    Outpatient Subjective & Objective

## 2019-12-13 NOTE — HPI
History of present illness- I had the pleasure of meeting this pleasant 64 y.o. gentleman in the pre op clinic prior to his elective Orthopedic surgery. The patient is new to me . Frank was accompanied by wife Laisha .    I have obtained the history by speaking to the patient and by reviewing the electronic health records.    Events leading up to surgery / History of presenting illness -    He has been troubled with moderate-severe  Rt knee  pain for 6 months  . Pain increases with activity and decreases with resting.    Relevant health conditions of significance for the perioperative period/ History of presenting illness -    Lives with wife   Wife works for [employer] conditions of significance for the perioperative period     Provoked thrombosis    Sleep apnea     Not known to have heart disease , Diabetes Mellitus, Lung disease , HTN

## 2019-12-13 NOTE — ASSESSMENT & PLAN NOTE
Was on Ibuprofen for Rt knee pain for 6 months   Likely cause of Anemia - Ibuprofen, ASA 81 mg daily   No overt GI / bleeding   Suggested follow up

## 2019-12-13 NOTE — PROGRESS NOTES
[facility] - Pre Op Consult  Progress Note    Patient Name: Frank Gay Jr.  MRN: [id number]  Date of Evaluation- 12/13/2019  PCP- Zeeshan Barron MD    Future cases for Frank Gay Jr. [[id number]]     Case ID Status Date Time Chai Procedure Provider Location    [id number] [facility] 12/18/2019  1:23  ARTHROPLASTY, KNEE, TOTAL-DEPUY-SIGMA Ken Amezcua III, MD [[id number]] [facility] OR      Rt     HPI:  History of present illness- I had the pleasure of meeting this pleasant 64 y.o. gentleman in the pre op clinic prior to his elective Orthopedic surgery. The patient is new to me . Frank was accompanied by wife Laisha .    I have obtained the history by speaking to the patient and by reviewing the electronic health records.    Events leading up to surgery / History of presenting illness -    He has been troubled with moderate-severe  Rt knee  pain for 6 months  . Pain increases with activity and decreases with resting.    Relevant health conditions of significance for the perioperative period/ History of presenting illness -    Lives with wife   Wife works for [employer] conditions of significance for the perioperative period     Provoked thrombosis    Sleep apnea     Not known to have heart disease , Diabetes Mellitus, Lung disease , HTN         Subjective/ Objective:       Chief complaint-Preoperative evaluation, Perioperative Medical management, complication reduction plan     Active cardiac conditions- none    Revised cardiac risk index predictors- none    Functional capacity -Examples of physical activity, lives [place] a farm, feeds animals,    can take 1 flight of stairs----- He can undertake all the above activities without  chest pain,chest tightness, Shortness of breath ,dizziness,lightheadedness making his exercise tolerance more, than 4 Mets.       Review of Systems   Constitutional: Negative for chills and fever.          Weight loss-with portion control    HENT:        Sleep apnea    Eyes:        No  "unusual vision changes   Respiratory:        No cough , phlegm    No Hemoptysis   Cardiovascular:        As noted   Gastrointestinal:        Bowels- Regular   No overt GI/ blood losses   Endocrine:        Prednisone use > 20 mg daily for 3 weeks- None    Genitourinary: Negative for dysuria.        No urinary hesitancy    Musculoskeletal:        As above      Skin: Positive for rash.        Rt Ankle - 30 years   Has eczema  Itching   Uses Cortisone as needed   No cellulitis history    Neurological: Negative for syncope.        No unilateral weakness   Hematological:        Current use of Anticoagulants  None    Psychiatric/Behavioral:        No Depression,Anxiety       No Coronary , Peripheral artery stenting           No anesthesia, bleeding, cardiac problems , PONV with previous surgeries/procedures.  Medications and Allergies reviewed in epic.     FH- No anesthesia,bleeding / venous thrombosis ,  in family     Physical Exam     Vitals - 1 value per visit 12/13/2019   SYSTOLIC 136   DIASTOLIC 63   PULSE 57   TEMPERATURE 98.3   RESPIRATIONS 16   SPO2 98   Weight (lb) 227   Weight (kg) 102.967   HEIGHT 6' 2"   BODY MASS INDEX 29.15         Physical Exam  Constitutional- General appearance-Conscious,Coherent  Eyes- No conjunctival icterus,pupils  round  and reactive to light   ENT-Oral cavity- moist  , Hearing grossly normal   Neck- No thyromegaly ,Trachea -central, No jugular venous distension,   No Carotid Bruit   Cardiovascular -Heart Sounds- Normal  and  no murmur   , No gallop rhythm   Respiratory - Normal Respiratory Effort, Normal breath sounds,  no wheeze  and  no forced expiratory wheeze    Peripheral pitting pedal edema LLE > RLE--LLE and  mild, no calf pain   Gastrointestinal -Soft abdomen, No palpable masses, Non Tender,Liver,Spleen not palpable. No-- free fluid and shifting dullness  Musculoskeletal- No finger Clubbing. Strength grossly normal   Lymphatic-No Palpable cervical, axillary,Inguinal " lymphadenopathy   Psychiatric - normal effect,Orientation  Rt Dorsalis pedis pulses-palpable    Lt Dorsalis pedis pulses- palpable   Rt Posterior tibial pulses -palpable   Left posterior tibial pulses -palpable   Miscellaneous -  no asterixis and  no renal bruit   Left ankle swelling - chronic appearing  Rt lower extremity rash - no infection       Investigations  Lab and Imaging have been reviewed in epic.    Review of Medicine tests  MARCH 2018     No hemodynamically significant carotid stenosis    EKG- I had independently reviewed the EKG from--12/13/2019   It was reported to be showing     Sinus bradycardia  Nonspecific T wave abnormality  Abnormal ECG  When compared with ECG of 28-JUN-2016 09:32,  Nonspecific T wave abnormality now evident in Lateral leads    Review of clinical lab tests:  Lab Results   Component Value Date    CREATININE 0.8 12/05/2019    HGB 13.3 (L) 12/05/2019     12/05/2019         Review of old records- Was done and information gathered regards to events leading to surgery and health conditions of significance in the perioperative period.        Preoperative cardiac risk assessment-  The patient does not have any active cardiac conditions . Revised cardiac risk index predictors- 0---.Functional capacity is more than 4 Mets. He will be undergoing a Orthopedic procedure that carries a intermediate risk     Risk of a major Cardiac event ( Defined as death, myocardial infarction, or cardiac arrest at 30 days after noncardiac surgery), based on RCRI score     -3.9%         No further cardiac work up is indicated prior to proceeding with the surgery          American Society of Anesthesiologists Physical status classification ( ASA ) class: 3   Postoperative pulmonary complication risk assessment:    ARISCAT ( Canet) risk index- risk class -  Low, if duration of surgery is under 3 hours, intermediate, if duration of surgery is over 3 hours          Assessment/Plan:     History of  thrombosis  Pulmonary embolism   Had hypoxia , hallucination   Age 22 years - 1996   Was trying  to get on a moving tractor , when he slipped on the step ( [employer]ud boats ) and tractor rolled over the Left leg, Pelvis   Had crush injury     He had a left open tibia fracture and a left femur fracture  Was hospitalized for 7.5 weeks   Was in traction   Was casted   Did not require surgery   Was managed conservatively     History of PE  Episode- 1  Associated with reduced mobility,no  long journeys, no cancer, no prior surgery, Hospital stay, no HRT  As per the description , had a procedure through Rt elbow- filter / ? Lytic therapy - no longer has filter   Had to be in ICU for 1 weeks   Left  ankle is  stiff and arthritic.     Increased risk of thrombosis in the taj operative period    Cubital tunnel syndrome on right  Was operated   Has mild numbness Rt little finger - not too troublesome     Elevated PSA  Had a history of elevated PSA  Had a biopsy   Only had a few biopsies   No cancer per him   Under follow up    Arthritis of ankle, left  No trouble some pain , but affecting his gait   Suggested follow up    S/P knee replacement  Left sided - 2013  Did well    Obstructive sleep apnea  Had surgery   Possible UPPP   No uvula on exam   Not tolerating CPAP   Wife reports occasional loud snoring - better lately with weight loss  Occasional apnea       sleep apnea- I suggested follow up  and suggest caution with usage of medication that can cause respiratory suppression in the perioperative period    potential ramifications of untreated sleep apnea, which could include daytime sleepiness, hypertension, heart disease and stroke were discussed    Avoidance of  supine sleep, weight gain , alcoholic beverages , care with , sedative , CNS depressant use indicated  since all of these can worsen LILIAN     History suggests that his  Sleep apnea may have improved , but still seems to  be having some sleep apnea     Care with sedating  Medication suggested taj op         Rash  Rt lower leg - 30 years   Has eczema  Itching   Uses Cortisone as needed   No cellulitis history   No recent problem  On exam- no warmth, no pain , No suggestion of active infection  Messaged surgeon     Anemia  Was on Ibuprofen for Rt knee pain for 6 months   Likely cause of Anemia - Ibuprofen, ASA 81 mg daily   No overt GI / bleeding   Suggested follow up     Sinus bradycardia  Asymptomatic   Suggest taj op Cardiac monitoring   No overt hypothyroid symptoms     Long term current use of antithrombotics/antiplatelets  ASA 81 mg po daily for calcification in neck area picked up dental x rays   Had carotid US- that showed - No hemodynamically significant carotid stenosis    Edema  Edema- I suggested avoidance of added salt,avoidance of NSAID's, unless advised or ordered  and suggested Limb elevation and pipe hose use        Preventive perioperative care    Thromboembolic prophylaxis:  His risk factors for thrombosis include surgical procedure, previous history of thrombosis and age.I suggest  thromboembolic prophylaxis ( mechanical/pharmacological, weighing the risk benefits of pharmacological agent use considering taj procedural bleeding )  during the perioperative period.I suggested being active in the post operative period.    The patient is a candidate for extended DVT prophylaxis    Postoperative pulmonary complication prophylaxis-Risk factors for post operative pulmonary complications include sleep apnea  ASA class >2- I suggest incentive spirometry use, early ambulation and end tidal carbon dioxide monitoring  , oral care , head end of bed elevation      Renal complication prophylaxis-I suggest keeping him well hydrated in the perioperative period       Surgical site Infection Prophylaxis-I  suggest appropriate antibiotic for Prophylaxis against Surgical site infections     In view of regional anaesthesia  the patient  is at risk of postoperative urinary retention.   I suggest avoidance / minimizing the of  Benzodiazepines,Anticholinergic medication,antihistamines ( Benadryl) , if possible in the perioperative period. I suggest using the minimum possible use of opioids for the minimum period of time in the perioperative period. Benadryl avoidance suggested      This visit was focused on Preoperative evaluation, Perioperative Medical management, complication reduction plans. I suggest that the patient follows up with primary care or relevant sub specialists for ongoing health care.    I appreciate the opportunity to be involved in this patients care. Please feel free to contact me if there were any questions about this consultation.    Patient is optimized     Patient was instructed to call and update me about any changes to health,  medication, office visits ,testing out side of the taj operative care center , hospitalizations between now and surgery     Anuradha Barnhart MD  Perioperative Medicine  [facility]   Pager [phone]  --  12/13- 17 25     EKG report from 12/13/2019   Baseline Artifact  Sinus bradycardia  Nonspecific T wave abnormality  Abnormal ECG  When compared with ECG of 28-JUN-2016 09:32,  Nonspecific T wave abnormality now evident in Lateral leads  --  12/17- 7 32    Correspondence from surgeon from 12/16   Plan is to  see what the rash  looks like when he comes for surgery      Called to follow up on rash   Spoke to him  Gets itchy , when gets dry - can use vaseline   No redness going up the leg  Holding off Hydrocortisone - has not made much difference   Not troubled much during winter   Had not seen dermatologist about this - suggested evaluation   Rash no change - no better , no worse , had it for many years    Called to follow up , spoke to her to address any concerns with the up coming surgery or any questions on Medication instructions -  Doing well ,No changes to Medication, Health -

## 2019-12-13 NOTE — ASSESSMENT & PLAN NOTE
Had a history of elevated PSA  Had a biopsy   Only had a few biopsies   No cancer per him   Under follow up

## 2019-12-13 NOTE — ASSESSMENT & PLAN NOTE
ASA 81 mg po daily for calcification in neck area picked up dental x rays   Had carotid US- that showed - No hemodynamically significant carotid stenosis

## 2019-12-17 ENCOUNTER — TELEPHONE (OUTPATIENT)
Dept: ORTHOPEDICS | Facility: CLINIC | Age: 64
End: 2019-12-17

## 2019-12-17 DIAGNOSIS — Z96.651 STATUS POST TOTAL RIGHT KNEE REPLACEMENT: Primary | ICD-10-CM

## 2019-12-17 RX ORDER — OXYCODONE HYDROCHLORIDE 5 MG/1
TABLET ORAL
Qty: 50 TABLET | Refills: 0 | Status: SHIPPED | OUTPATIENT
Start: 2019-12-17 | End: 2019-12-23 | Stop reason: SDUPTHER

## 2019-12-17 RX ORDER — DOCUSATE SODIUM 100 MG/1
100 CAPSULE, LIQUID FILLED ORAL 2 TIMES DAILY PRN
Qty: 60 CAPSULE | Refills: 0 | Status: SHIPPED | OUTPATIENT
Start: 2019-12-17 | End: 2020-08-13

## 2019-12-17 RX ORDER — CELECOXIB 200 MG/1
200 CAPSULE ORAL DAILY
Qty: 30 CAPSULE | Refills: 0 | Status: SHIPPED | OUTPATIENT
Start: 2019-12-17 | End: 2020-01-16

## 2019-12-17 NOTE — TELEPHONE ENCOUNTER
Spoke with pt. Informed pt of arrival time for surgery on 12/18 5am 1st [facility]. No food or drink after midnight. Pt gave verbal understanding.

## 2019-12-18 ENCOUNTER — ANESTHESIA (OUTPATIENT)
Dept: SURGERY | Facility: HOSPITAL | Age: 64
DRG: 470 | End: 2019-12-18
Payer: COMMERCIAL

## 2019-12-18 ENCOUNTER — HOSPITAL ENCOUNTER (INPATIENT)
Facility: HOSPITAL | Age: 64
LOS: 1 days | Discharge: HOME | DRG: 470 | End: 2019-12-19
Attending: ORTHOPAEDIC SURGERY | Admitting: ORTHOPAEDIC SURGERY
Payer: COMMERCIAL

## 2019-12-18 DIAGNOSIS — M17.11 PRIMARY OSTEOARTHRITIS OF RIGHT KNEE: Primary | ICD-10-CM

## 2019-12-18 PROCEDURE — 97116 GAIT TRAINING THERAPY: CPT

## 2019-12-18 PROCEDURE — 27201423 OPTIME MED/SURG SUP & DEVICES STERILE SUPPLY: Performed by: ORTHOPAEDIC SURGERY

## 2019-12-18 PROCEDURE — 27447 PR TOTAL KNEE ARTHROPLASTY: ICD-10-PCS | Mod: RT,,, | Performed by: ORTHOPAEDIC SURGERY

## 2019-12-18 PROCEDURE — 37000009 HC ANESTHESIA EA ADD 15 MINS: Performed by: ORTHOPAEDIC SURGERY

## 2019-12-18 PROCEDURE — 97165 OT EVAL LOW COMPLEX 30 MIN: CPT

## 2019-12-18 PROCEDURE — 94660 CPAP INITIATION&MGMT: CPT

## 2019-12-18 PROCEDURE — 63600175 PHARM REV CODE 636 W HCPCS: Performed by: STUDENT IN AN ORGANIZED HEALTH CARE EDUCATION/TRAINING PROGRAM

## 2019-12-18 PROCEDURE — 27447 TOTAL KNEE ARTHROPLASTY: CPT | Mod: RT,,, | Performed by: ORTHOPAEDIC SURGERY

## 2019-12-18 PROCEDURE — 37000008 HC ANESTHESIA 1ST 15 MINUTES: Performed by: ORTHOPAEDIC SURGERY

## 2019-12-18 PROCEDURE — 25000003 PHARM REV CODE 250: Performed by: ORTHOPAEDIC SURGERY

## 2019-12-18 PROCEDURE — 71000033 HC RECOVERY, INTIAL HOUR: Performed by: ORTHOPAEDIC SURGERY

## 2019-12-18 PROCEDURE — 63600175 PHARM REV CODE 636 W HCPCS: Performed by: PHYSICIAN ASSISTANT

## 2019-12-18 PROCEDURE — 97535 SELF CARE MNGMENT TRAINING: CPT

## 2019-12-18 PROCEDURE — 25000003 PHARM REV CODE 250: Performed by: NURSE ANESTHETIST, CERTIFIED REGISTERED

## 2019-12-18 PROCEDURE — 64448 NJX AA&/STRD FEM NRV NFS IMG: CPT | Mod: 59,RT,, | Performed by: ANESTHESIOLOGY

## 2019-12-18 PROCEDURE — D9220A PRA ANESTHESIA: ICD-10-PCS | Mod: ANES,,, | Performed by: ANESTHESIOLOGY

## 2019-12-18 PROCEDURE — 64448 PR NERVE BLOCK INJ, ANES/STEROID, FEMORAL, CONT INFUSION, INCL IMAG GUIDANCE: ICD-10-PCS | Mod: 59,RT,, | Performed by: ANESTHESIOLOGY

## 2019-12-18 PROCEDURE — 27200688 HC TRAY, SPINAL-HYPER/ ISOBARIC: Performed by: NURSE ANESTHETIST, CERTIFIED REGISTERED

## 2019-12-18 PROCEDURE — D9220A PRA ANESTHESIA: Mod: ANES,,, | Performed by: ANESTHESIOLOGY

## 2019-12-18 PROCEDURE — C1713 ANCHOR/SCREW BN/BN,TIS/BN: HCPCS | Performed by: ORTHOPAEDIC SURGERY

## 2019-12-18 PROCEDURE — 76942 PR U/S GUIDANCE FOR NEEDLE GUIDANCE: ICD-10-PCS | Mod: 26,,, | Performed by: ANESTHESIOLOGY

## 2019-12-18 PROCEDURE — 25000003 PHARM REV CODE 250: Performed by: STUDENT IN AN ORGANIZED HEALTH CARE EDUCATION/TRAINING PROGRAM

## 2019-12-18 PROCEDURE — 27200670 HC STIMUCATH COMPLETE KIT: Performed by: ANESTHESIOLOGY

## 2019-12-18 PROCEDURE — 94799 UNLISTED PULMONARY SVC/PX: CPT

## 2019-12-18 PROCEDURE — 25000003 PHARM REV CODE 250: Performed by: ANESTHESIOLOGY

## 2019-12-18 PROCEDURE — 76942 ECHO GUIDE FOR BIOPSY: CPT | Mod: 26,,, | Performed by: ANESTHESIOLOGY

## 2019-12-18 PROCEDURE — 36000710: Performed by: ORTHOPAEDIC SURGERY

## 2019-12-18 PROCEDURE — 36000711: Performed by: ORTHOPAEDIC SURGERY

## 2019-12-18 PROCEDURE — 25000003 PHARM REV CODE 250: Performed by: PHYSICIAN ASSISTANT

## 2019-12-18 PROCEDURE — S0020 INJECTION, BUPIVICAINE HYDRO: HCPCS | Performed by: ANESTHESIOLOGY

## 2019-12-18 PROCEDURE — C1776 JOINT DEVICE (IMPLANTABLE): HCPCS | Performed by: ORTHOPAEDIC SURGERY

## 2019-12-18 PROCEDURE — 97161 PT EVAL LOW COMPLEX 20 MIN: CPT

## 2019-12-18 PROCEDURE — 71000039 HC RECOVERY, EACH ADD'L HOUR: Performed by: ORTHOPAEDIC SURGERY

## 2019-12-18 PROCEDURE — 64448 NJX AA&/STRD FEM NRV NFS IMG: CPT | Performed by: ANESTHESIOLOGY

## 2019-12-18 PROCEDURE — 63600175 PHARM REV CODE 636 W HCPCS: Performed by: ORTHOPAEDIC SURGERY

## 2019-12-18 PROCEDURE — 99900035 HC TECH TIME PER 15 MIN (STAT)

## 2019-12-18 PROCEDURE — D9220A PRA ANESTHESIA: ICD-10-PCS | Mod: CRNA,,, | Performed by: NURSE ANESTHETIST, CERTIFIED REGISTERED

## 2019-12-18 PROCEDURE — 11000001 HC ACUTE MED/SURG PRIVATE ROOM

## 2019-12-18 PROCEDURE — 63600175 PHARM REV CODE 636 W HCPCS: Performed by: NURSE ANESTHETIST, CERTIFIED REGISTERED

## 2019-12-18 PROCEDURE — 94761 N-INVAS EAR/PLS OXIMETRY MLT: CPT

## 2019-12-18 PROCEDURE — 76942 ECHO GUIDE FOR BIOPSY: CPT | Performed by: ANESTHESIOLOGY

## 2019-12-18 PROCEDURE — 94770 HC EXHALED C02 TEST: CPT

## 2019-12-18 PROCEDURE — D9220A PRA ANESTHESIA: Mod: CRNA,,, | Performed by: NURSE ANESTHETIST, CERTIFIED REGISTERED

## 2019-12-18 DEVICE — COMPONENT FEM PS CEM SZ4 R: Type: IMPLANTABLE DEVICE | Site: KNEE | Status: FUNCTIONAL

## 2019-12-18 DEVICE — CEMENT BONE WHOLE BATCH: Type: IMPLANTABLE DEVICE | Site: KNEE | Status: FUNCTIONAL

## 2019-12-18 DEVICE — INSERT TIB STBL SIGMA SZ4 8MM: Type: IMPLANTABLE DEVICE | Site: KNEE | Status: FUNCTIONAL

## 2019-12-18 DEVICE — PATELLA OVAL 38MM: Type: IMPLANTABLE DEVICE | Site: KNEE | Status: FUNCTIONAL

## 2019-12-18 DEVICE — TRAY TIBIAL CEMENT SZ 4 COBALT: Type: IMPLANTABLE DEVICE | Site: KNEE | Status: FUNCTIONAL

## 2019-12-18 RX ORDER — LIDOCAINE HYDROCHLORIDE 10 MG/ML
1 INJECTION, SOLUTION EPIDURAL; INFILTRATION; INTRACAUDAL; PERINEURAL
Status: DISCONTINUED | OUTPATIENT
Start: 2019-12-18 | End: 2019-12-18

## 2019-12-18 RX ORDER — CELECOXIB 200 MG/1
400 CAPSULE ORAL
Status: COMPLETED | OUTPATIENT
Start: 2019-12-18 | End: 2019-12-18

## 2019-12-18 RX ORDER — CEFAZOLIN SODIUM 1 G/3ML
2 INJECTION, POWDER, FOR SOLUTION INTRAMUSCULAR; INTRAVENOUS
Status: COMPLETED | OUTPATIENT
Start: 2019-12-18 | End: 2019-12-18

## 2019-12-18 RX ORDER — SODIUM CHLORIDE 9 MG/ML
INJECTION, SOLUTION INTRAVENOUS CONTINUOUS PRN
Status: DISCONTINUED | OUTPATIENT
Start: 2019-12-18 | End: 2019-12-18

## 2019-12-18 RX ORDER — PREGABALIN 75 MG/1
75 CAPSULE ORAL
Status: COMPLETED | OUTPATIENT
Start: 2019-12-18 | End: 2019-12-18

## 2019-12-18 RX ORDER — DEXAMETHASONE SODIUM PHOSPHATE 4 MG/ML
INJECTION, SOLUTION INTRA-ARTICULAR; INTRALESIONAL; INTRAMUSCULAR; INTRAVENOUS; SOFT TISSUE
Status: DISCONTINUED | OUTPATIENT
Start: 2019-12-18 | End: 2019-12-18

## 2019-12-18 RX ORDER — ASPIRIN 81 MG/1
81 TABLET ORAL ONCE
Status: COMPLETED | OUTPATIENT
Start: 2019-12-18 | End: 2019-12-18

## 2019-12-18 RX ORDER — ROPIVACAINE HYDROCHLORIDE 2 MG/ML
8 INJECTION, SOLUTION EPIDURAL; INFILTRATION; PERINEURAL CONTINUOUS
Status: DISCONTINUED | OUTPATIENT
Start: 2019-12-18 | End: 2019-12-19 | Stop reason: HOSPADM

## 2019-12-18 RX ORDER — ONDANSETRON 2 MG/ML
4 INJECTION INTRAMUSCULAR; INTRAVENOUS EVERY 8 HOURS PRN
Status: DISCONTINUED | OUTPATIENT
Start: 2019-12-18 | End: 2019-12-19 | Stop reason: HOSPADM

## 2019-12-18 RX ORDER — SODIUM CHLORIDE 9 MG/ML
INJECTION, SOLUTION INTRAVENOUS CONTINUOUS
Status: ACTIVE | OUTPATIENT
Start: 2019-12-18 | End: 2019-12-19

## 2019-12-18 RX ORDER — MORPHINE SULFATE 2 MG/ML
2 INJECTION, SOLUTION INTRAMUSCULAR; INTRAVENOUS
Status: DISCONTINUED | OUTPATIENT
Start: 2019-12-18 | End: 2019-12-19 | Stop reason: HOSPADM

## 2019-12-18 RX ORDER — OXYCODONE HYDROCHLORIDE 10 MG/1
10 TABLET ORAL
Status: DISCONTINUED | OUTPATIENT
Start: 2019-12-18 | End: 2019-12-19 | Stop reason: HOSPADM

## 2019-12-18 RX ORDER — CELECOXIB 200 MG/1
200 CAPSULE ORAL DAILY
Status: DISCONTINUED | OUTPATIENT
Start: 2019-12-19 | End: 2019-12-19 | Stop reason: HOSPADM

## 2019-12-18 RX ORDER — SODIUM CHLORIDE 0.9 % (FLUSH) 0.9 %
10 SYRINGE (ML) INJECTION
Status: DISCONTINUED | OUTPATIENT
Start: 2019-12-18 | End: 2019-12-18

## 2019-12-18 RX ORDER — MUPIROCIN 20 MG/G
1 OINTMENT TOPICAL 2 TIMES DAILY
Status: DISCONTINUED | OUTPATIENT
Start: 2019-12-18 | End: 2019-12-19 | Stop reason: HOSPADM

## 2019-12-18 RX ORDER — FENTANYL CITRATE 50 UG/ML
25 INJECTION, SOLUTION INTRAMUSCULAR; INTRAVENOUS EVERY 5 MIN PRN
Status: DISCONTINUED | OUTPATIENT
Start: 2019-12-18 | End: 2019-12-18

## 2019-12-18 RX ORDER — MIDAZOLAM HYDROCHLORIDE 1 MG/ML
INJECTION, SOLUTION INTRAMUSCULAR; INTRAVENOUS
Status: DISCONTINUED | OUTPATIENT
Start: 2019-12-18 | End: 2019-12-18

## 2019-12-18 RX ORDER — ASPIRIN 81 MG/1
81 TABLET ORAL 2 TIMES DAILY
Status: DISCONTINUED | OUTPATIENT
Start: 2019-12-18 | End: 2019-12-18

## 2019-12-18 RX ORDER — SODIUM CHLORIDE 9 MG/ML
INJECTION, SOLUTION INTRAVENOUS
Status: COMPLETED | OUTPATIENT
Start: 2019-12-18 | End: 2019-12-18

## 2019-12-18 RX ORDER — TALC
6 POWDER (GRAM) TOPICAL NIGHTLY PRN
Status: DISCONTINUED | OUTPATIENT
Start: 2019-12-18 | End: 2019-12-18

## 2019-12-18 RX ORDER — VANCOMYCIN HYDROCHLORIDE 1 G/20ML
INJECTION, POWDER, LYOPHILIZED, FOR SOLUTION INTRAVENOUS
Status: DISCONTINUED | OUTPATIENT
Start: 2019-12-18 | End: 2019-12-18 | Stop reason: HOSPADM

## 2019-12-18 RX ORDER — PROPOFOL 10 MG/ML
VIAL (ML) INTRAVENOUS CONTINUOUS PRN
Status: DISCONTINUED | OUTPATIENT
Start: 2019-12-18 | End: 2019-12-18

## 2019-12-18 RX ORDER — METHOCARBAMOL 500 MG/1
500 TABLET, FILM COATED ORAL 4 TIMES DAILY PRN
Status: DISCONTINUED | OUTPATIENT
Start: 2019-12-18 | End: 2019-12-19 | Stop reason: HOSPADM

## 2019-12-18 RX ORDER — OXYCODONE HYDROCHLORIDE 5 MG/1
5 TABLET ORAL
Status: DISCONTINUED | OUTPATIENT
Start: 2019-12-18 | End: 2019-12-19 | Stop reason: HOSPADM

## 2019-12-18 RX ORDER — MIDAZOLAM HYDROCHLORIDE 1 MG/ML
1 INJECTION INTRAMUSCULAR; INTRAVENOUS EVERY 5 MIN PRN
Status: DISCONTINUED | OUTPATIENT
Start: 2019-12-18 | End: 2019-12-18

## 2019-12-18 RX ORDER — AMOXICILLIN 250 MG
1 CAPSULE ORAL 2 TIMES DAILY
Status: DISCONTINUED | OUTPATIENT
Start: 2019-12-18 | End: 2019-12-19 | Stop reason: HOSPADM

## 2019-12-18 RX ORDER — NALOXONE HCL 0.4 MG/ML
0.02 VIAL (ML) INJECTION
Status: DISCONTINUED | OUTPATIENT
Start: 2019-12-18 | End: 2019-12-19 | Stop reason: HOSPADM

## 2019-12-18 RX ORDER — BISACODYL 10 MG
10 SUPPOSITORY, RECTAL RECTAL EVERY 12 HOURS PRN
Status: DISCONTINUED | OUTPATIENT
Start: 2019-12-18 | End: 2019-12-19 | Stop reason: HOSPADM

## 2019-12-18 RX ORDER — ROPIVACAINE/EPI/CLONIDINE/KET 2.46-0.005
SYRINGE (ML) INJECTION
Status: DISCONTINUED | OUTPATIENT
Start: 2019-12-18 | End: 2019-12-18 | Stop reason: HOSPADM

## 2019-12-18 RX ORDER — PREGABALIN 75 MG/1
75 CAPSULE ORAL NIGHTLY
Status: DISCONTINUED | OUTPATIENT
Start: 2019-12-18 | End: 2019-12-19 | Stop reason: HOSPADM

## 2019-12-18 RX ORDER — ONDANSETRON 2 MG/ML
INJECTION INTRAMUSCULAR; INTRAVENOUS
Status: DISCONTINUED | OUTPATIENT
Start: 2019-12-18 | End: 2019-12-18

## 2019-12-18 RX ORDER — MUPIROCIN 20 MG/G
1 OINTMENT TOPICAL
Status: COMPLETED | OUTPATIENT
Start: 2019-12-18 | End: 2019-12-18

## 2019-12-18 RX ORDER — FAMOTIDINE 20 MG/1
20 TABLET, FILM COATED ORAL 2 TIMES DAILY
Status: DISCONTINUED | OUTPATIENT
Start: 2019-12-18 | End: 2019-12-19 | Stop reason: HOSPADM

## 2019-12-18 RX ORDER — BUPIVACAINE HYDROCHLORIDE 2.5 MG/ML
INJECTION, SOLUTION INFILTRATION; PERINEURAL
Status: COMPLETED | OUTPATIENT
Start: 2019-12-18 | End: 2019-12-18

## 2019-12-18 RX ORDER — POLYETHYLENE GLYCOL 3350 17 G/17G
17 POWDER, FOR SOLUTION ORAL DAILY
Status: DISCONTINUED | OUTPATIENT
Start: 2019-12-18 | End: 2019-12-19 | Stop reason: HOSPADM

## 2019-12-18 RX ORDER — ACETAMINOPHEN 500 MG
1000 TABLET ORAL EVERY 6 HOURS
Status: DISCONTINUED | OUTPATIENT
Start: 2019-12-18 | End: 2019-12-19 | Stop reason: HOSPADM

## 2019-12-18 RX ORDER — TRANEXAMIC ACID 100 MG/ML
INJECTION, SOLUTION INTRAVENOUS
Status: DISCONTINUED | OUTPATIENT
Start: 2019-12-18 | End: 2019-12-18

## 2019-12-18 RX ORDER — KETAMINE HCL IN 0.9 % NACL 50 MG/5 ML
SYRINGE (ML) INTRAVENOUS
Status: DISCONTINUED | OUTPATIENT
Start: 2019-12-18 | End: 2019-12-18

## 2019-12-18 RX ORDER — LIDOCAINE HCL/PF 100 MG/5ML
SYRINGE (ML) INTRAVENOUS
Status: DISCONTINUED | OUTPATIENT
Start: 2019-12-18 | End: 2019-12-18

## 2019-12-18 RX ORDER — FENTANYL CITRATE 50 UG/ML
INJECTION, SOLUTION INTRAMUSCULAR; INTRAVENOUS
Status: DISCONTINUED | OUTPATIENT
Start: 2019-12-18 | End: 2019-12-18

## 2019-12-18 RX ADMIN — FAMOTIDINE 20 MG: 20 TABLET ORAL at 08:12

## 2019-12-18 RX ADMIN — LIDOCAINE HYDROCHLORIDE 60 MG: 20 INJECTION, SOLUTION INTRAVENOUS at 07:12

## 2019-12-18 RX ADMIN — SODIUM CHLORIDE: 0.9 INJECTION, SOLUTION INTRAVENOUS at 03:12

## 2019-12-18 RX ADMIN — MUPIROCIN 1 G: 20 OINTMENT TOPICAL at 08:12

## 2019-12-18 RX ADMIN — MORPHINE SULFATE 2 MG: 2 INJECTION, SOLUTION INTRAMUSCULAR; INTRAVENOUS at 11:12

## 2019-12-18 RX ADMIN — SODIUM CHLORIDE: 0.9 INJECTION, SOLUTION INTRAVENOUS at 09:12

## 2019-12-18 RX ADMIN — ASPIRIN 81 MG: 81 TABLET, COATED ORAL at 08:12

## 2019-12-18 RX ADMIN — SODIUM CHLORIDE, SODIUM GLUCONATE, SODIUM ACETATE, POTASSIUM CHLORIDE, MAGNESIUM CHLORIDE, SODIUM PHOSPHATE, DIBASIC, AND POTASSIUM PHOSPHATE: .53; .5; .37; .037; .03; .012; .00082 INJECTION, SOLUTION INTRAVENOUS at 07:12

## 2019-12-18 RX ADMIN — PROPOFOL 125 MCG/KG/MIN: 10 INJECTION, EMULSION INTRAVENOUS at 07:12

## 2019-12-18 RX ADMIN — OXYCODONE HYDROCHLORIDE 10 MG: 10 TABLET ORAL at 12:12

## 2019-12-18 RX ADMIN — ACETAMINOPHEN 1000 MG: 500 TABLET ORAL at 11:12

## 2019-12-18 RX ADMIN — MIDAZOLAM HYDROCHLORIDE 1 MG: 1 INJECTION, SOLUTION INTRAMUSCULAR; INTRAVENOUS at 06:12

## 2019-12-18 RX ADMIN — VANCOMYCIN HYDROCHLORIDE 1500 MG: 1.5 INJECTION, POWDER, LYOPHILIZED, FOR SOLUTION INTRAVENOUS at 06:12

## 2019-12-18 RX ADMIN — FENTANYL CITRATE 50 MCG: 50 INJECTION INTRAMUSCULAR; INTRAVENOUS at 06:12

## 2019-12-18 RX ADMIN — TRANEXAMIC ACID 1000 MG: 100 INJECTION, SOLUTION INTRAVENOUS at 07:12

## 2019-12-18 RX ADMIN — PREGABALIN 75 MG: 75 CAPSULE ORAL at 05:12

## 2019-12-18 RX ADMIN — CEFAZOLIN 2 G: 330 INJECTION, POWDER, FOR SOLUTION INTRAMUSCULAR; INTRAVENOUS at 07:12

## 2019-12-18 RX ADMIN — SODIUM CHLORIDE: 0.9 INJECTION, SOLUTION INTRAVENOUS at 06:12

## 2019-12-18 RX ADMIN — FENTANYL CITRATE 25 MCG: 50 INJECTION INTRAMUSCULAR; INTRAVENOUS at 06:12

## 2019-12-18 RX ADMIN — CELECOXIB 400 MG: 200 CAPSULE ORAL at 05:12

## 2019-12-18 RX ADMIN — CEFAZOLIN 2 G: 1 INJECTION, POWDER, FOR SOLUTION INTRAMUSCULAR; INTRAVENOUS at 02:12

## 2019-12-18 RX ADMIN — BUPIVACAINE HYDROCHLORIDE 20 ML: 2.5 INJECTION, SOLUTION INFILTRATION; PERINEURAL at 06:12

## 2019-12-18 RX ADMIN — MUPIROCIN 1 G: 20 OINTMENT TOPICAL at 05:12

## 2019-12-18 RX ADMIN — ONDANSETRON 4 MG: 2 INJECTION INTRAMUSCULAR; INTRAVENOUS at 07:12

## 2019-12-18 RX ADMIN — FAMOTIDINE 20 MG: 20 TABLET ORAL at 09:12

## 2019-12-18 RX ADMIN — SENNOSIDES AND DOCUSATE SODIUM 1 TABLET: 8.6; 5 TABLET ORAL at 09:12

## 2019-12-18 RX ADMIN — METHOCARBAMOL TABLETS 500 MG: 500 TABLET, COATED ORAL at 12:12

## 2019-12-18 RX ADMIN — MEPIVACAINE HYDROCHLORIDE 4 ML: 15 INJECTION, SOLUTION EPIDURAL; INFILTRATION at 07:12

## 2019-12-18 RX ADMIN — MIDAZOLAM 2 MG: 1 INJECTION INTRAMUSCULAR; INTRAVENOUS at 06:12

## 2019-12-18 RX ADMIN — ACETAMINOPHEN 1000 MG: 500 TABLET ORAL at 06:12

## 2019-12-18 RX ADMIN — DEXAMETHASONE SODIUM PHOSPHATE 8 MG: 4 INJECTION, SOLUTION INTRAMUSCULAR; INTRAVENOUS at 07:12

## 2019-12-18 RX ADMIN — OXYCODONE HYDROCHLORIDE 10 MG: 10 TABLET ORAL at 09:12

## 2019-12-18 RX ADMIN — FENTANYL CITRATE 25 MCG: 50 INJECTION, SOLUTION INTRAMUSCULAR; INTRAVENOUS at 07:12

## 2019-12-18 RX ADMIN — OXYCODONE HYDROCHLORIDE 10 MG: 10 TABLET ORAL at 03:12

## 2019-12-18 RX ADMIN — Medication 30 MG: at 07:12

## 2019-12-18 RX ADMIN — MUPIROCIN 1 G: 20 OINTMENT TOPICAL at 03:12

## 2019-12-18 RX ADMIN — PREGABALIN 75 MG: 75 CAPSULE ORAL at 08:12

## 2019-12-18 RX ADMIN — ROPIVACAINE HYDROCHLORIDE 8 ML/HR: 2 INJECTION, SOLUTION EPIDURAL; INFILTRATION at 07:12

## 2019-12-18 RX ADMIN — SENNOSIDES AND DOCUSATE SODIUM 1 TABLET: 8.6; 5 TABLET ORAL at 08:12

## 2019-12-18 RX ADMIN — CEFAZOLIN 2 G: 1 INJECTION, POWDER, FOR SOLUTION INTRAMUSCULAR; INTRAVENOUS at 11:12

## 2019-12-18 RX ADMIN — SODIUM CHLORIDE: 0.9 INJECTION, SOLUTION INTRAVENOUS at 08:12

## 2019-12-18 RX ADMIN — SODIUM CHLORIDE, SODIUM GLUCONATE, SODIUM ACETATE, POTASSIUM CHLORIDE, MAGNESIUM CHLORIDE, SODIUM PHOSPHATE, DIBASIC, AND POTASSIUM PHOSPHATE: .53; .5; .37; .037; .03; .012; .00082 INJECTION, SOLUTION INTRAVENOUS at 09:12

## 2019-12-18 RX ADMIN — ROPIVACAINE HYDROCHLORIDE 8 ML/HR: 2 INJECTION, SOLUTION EPIDURAL; INFILTRATION at 09:12

## 2019-12-18 NOTE — PLAN OF CARE
Problem: Occupational Therapy Goal  Goal: Occupational Therapy Goal  Description  Goals to be met by: 12/19/19     Patient will increase functional independence with ADLs by performing:    UE Dressing with [place].  LE Dressing with Modified [place].  Grooming while standing at sink with Modified [place].  Toileting from bedside commode with Modified [place] for hygiene and clothing management.   Bathing from  shower chair/bench with Modified [place].  Toilet transfer to bedside commode with Modified [place].     Outcome: Ongoing, Progressing

## 2019-12-18 NOTE — NURSING
Patient arrived to room 302 at this time. Vital signs stable. Respirations even and unlabored. No acute distress noted. Patient's surgical dressing to RLE is clean, dry and intact. Knee flexed per Dr. Amezcua. Neurovascular system intact. All questions and concerns addressed. Safety precautions in place. Will continue to monitor the patient closely.

## 2019-12-18 NOTE — ANESTHESIA PREPROCEDURE EVALUATION
12/18/2019  Frank Gay Jr. is a 64 y.o., male     Pre-operative evaluation for Procedure(s) (LRB):  ARTHROPLASTY, KNEE, TOTAL-DEPUY-SIGMA (Right)    Frank Gay Jr. is a 64 y.o. male     LDA:     Prev airway:     Drips:     Patient Active Problem List   Diagnosis    Fatigue    Elevated PSA    Obstructive sleep apnea    Arthritis of ankle, left    Personal history of venous thrombosis and embolism    Status post total right knee replacement    Cubital tunnel syndrome on right    Hyperlipidemia    Acquired cavovarus foot deformity, left    Equinus contracture of left ankle    History of thrombosis    Left ankle injury    Rash    Anemia    Sinus bradycardia    Long term current use of antithrombotics/antiplatelets    Edema    Primary osteoarthritis of right knee       Review of patient's allergies indicates:   Allergen Reactions    Dairy aid  [lactase]      Other reaction(s): stomach cramps  Other reaction(s): Itching  Other reaction(s): Diarrhea    Influenza a (h1n1) vac 09 (pf)     Lactose         No current facility-administered medications on file prior to encounter.      Current Outpatient Medications on File Prior to Encounter   Medication Sig Dispense Refill    ergocalciferol (ERGOCALCIFEROL) 50,000 unit Cap Take 1 capsule (50,000 Units total) by mouth every 7 days. 4 capsule 0    ibuprofen (ADVIL,MOTRIN) 200 MG tablet Take 400 mg by mouth every 6 (six) hours as needed for Pain.         Past Surgical History:   Procedure Laterality Date    ADENOIDECTOMY      ADENOIDECTOMY      APPENDECTOMY      ELBOW SURGERY  05/2016    EYE SURGERY      Eyelid    KNEE SURGERY      left knee    periodontal  03/2018    PROSTATE BIOPSY      TONSILLECTOMY         Social History     Socioeconomic History    Marital status:      Spouse name: Not on file    Number of children:  Not on file    Years of education: Not on file    Highest education level: Not on file   Occupational History    Not on file   Social Needs    Financial resource strain: Not on file    Food insecurity:     Worry: Not on file     Inability: Not on file    Transportation needs:     Medical: Not on file     Non-medical: Not on file   Tobacco Use    Smoking status: Never Smoker    Smokeless tobacco: Former User   Substance and Sexual Activity    Alcohol use: No    Drug use: No    Sexual activity: Yes     Partners: Female   Lifestyle    Physical activity:     Days per week: Not on file     Minutes per session: Not on file    Stress: Not on file   Relationships    Social connections:     Talks on phone: Not on file     Gets together: Not on file     Attends Orthodox service: Not on file     Active member of club or organization: Not on file     Attends meetings of clubs or organizations: Not on file     Relationship status: Not on file   Other Topics Concern    Not on file   Social History Narrative    Not on file         Vital Signs Range (Last 24H):  Temp:  [36.5 °C (97.7 °F)]   Pulse:  [50]   Resp:  [16]   BP: (136)/(63)   SpO2:  [98 %]       CBC: No results for input(s): WBC, RBC, HGB, HCT, PLT, MCV, MCH, MCHC in the last 72 hours.    CMP: No results for input(s): NA, K, CL, CO2, BUN, CREATININE, GLU, MG, PHOS, CALCIUM, ALBUMIN, PROT, ALKPHOS, ALT, AST, BILITOT in the last 72 hours.    INR  No results for input(s): PT, INR, PROTIME, APTT in the last 72 hours.        Diagnostic Studies:      EKD Echo:      .    Anesthesia Evaluation         Review of Systems      Physical Exam  General:  Well nourished    Airway/Jaw/Neck:  Airway Findings: Mouth Opening: Normal Tongue: Normal  General Airway Assessment: Adult  Mallampati: II  Improves to II with phonation.  TM Distance: Normal, at least 6 cm            Mental Status:  Mental Status Findings:  Cooperative, Alert and Oriented         Anesthesia  Plan  Type of Anesthesia, risks & benefits discussed:  Anesthesia Type:  general, spinal, CSE  Patient's Preference:   Intra-op Monitoring Plan: standard ASA monitors  Intra-op Monitoring Plan Comments:   Post Op Pain Control Plan: multimodal analgesia  Post Op Pain Control Plan Comments:   Induction:   IV  Beta Blocker:  Patient is not currently on a Beta-Blocker (No further documentation required).       Informed Consent: Patient understands risks and agrees with Anesthesia plan.  Questions answered. Anesthesia consent signed with patient.  ASA Score: 3     Day of Surgery Review of History & Physical:    H&P update referred to the surgeon.         Ready For Surgery From Anesthesia Perspective.

## 2019-12-18 NOTE — PLAN OF CARE
Patient has done well throughout the shift. Vital signs remain stable. No acute distress noted. Respirations even and unlabored. Pain well controlled. Patient working appropriately with PT/OT. Tolerating a regular diet. Neurovascular system intact. Patient and wife updated on the plan of care. Safety precautions in place. Will continue to monitor the patient closely.

## 2019-12-18 NOTE — PT/OT/SLP EVAL
Occupational Therapy   Evaluation    Name: Frank Gay Jr.  MRN: [id number]  Admitting Diagnosis:  Primary osteoarthritis of right knee Day of Surgery    Recommendations:     Discharge Recommendations: home  Discharge Equipment Recommendations:  none  Barriers to discharge:  None    Assessment:     Frank Gay Jr. is a 64 y.o. male with a medical diagnosis of Primary osteoarthritis of right knee.  He was able to perform supine/sit, sit/stand, bed/chair, and toilet T/F c SBA and RW.  Able to perform LB dressing c mod I.  Was able to walk to bathroom c SBA and RW.  Pt is progressing well.  Performance deficits affecting function: impaired self care skills, impaired functional mobilty, orthopedic precautions.      Rehab Prognosis: Good; patient would benefit from acute skilled OT services to address these deficits and reach maximum level of function.       Plan:     Patient to be seen daily to address the above listed problems via self-care/home management, therapeutic exercises, therapeutic activities  · Plan of Care Expires: 12/19/19  · Plan of Care Reviewed with: patient    Subjective     Chief Complaint: Pt is s/p R TKA and is WBAT R LE  Patient/Family Comments/goals: To get better.    Occupational Profile:  Living Environment: Pt lives in a one story house c [address] and has B rails which he can reach across to both sides and also has another set of steps in the back of house c a rail on the L side of the steps.  Has a walk-in shower.  Previous level of function: I PTA  Equipment Used at Home:  walker, standard, crutches, cane, straight, bedside commode, shower chair  Assistance upon Discharge: Wife    Pain/Comfort:  · Pain Rating 1: 2/10    Patients cultural, spiritual, Baptism conflicts given the current situation: no    Objective:     Communicated with: RN prior to session.  Patient found supine with cryotherapy, peripheral IV, perineural catheter upon OT entry to room.    General Precautions: Standard,  fall   Orthopedic Precautions:RLE weight bearing as tolerated   Braces: N/A     Occupational Performance:    Bed Mobility:    · Patient completed Supine to Sit with stand by assistance    Functional Mobility/Transfers:  · Patient completed Sit <> Stand Transfer with stand by assistance  with  rolling walker   · Patient completed Bed <> Chair Transfer using Stand Pivot technique with stand by assistance with rolling walker  · Patient completed Toilet Transfer Stand Pivot technique with stand by assistance with  rolling walker  · Functional Mobility: Pt was able to walk to bathroom c SBA and RW.    Activities of Daily Living:  · Lower Body Dressing: modified independence to don shorts.    Cognitive/Visual Perceptual:  Cognitive/Psychosocial Skills:     -       Oriented to: Person, Place, Time and Situation   -       Follows Commands/attention:Follows multistep  commands    Physical Exam:  Upper Extremity Range of Motion:     -       Right Upper Extremity: WFL  -       Left Upper Extremity: WFL  Upper Extremity Strength:    -       Right Upper Extremity: WFL  -       Left Upper Extremity: WFL    AMPAC 6 Click ADL:  AMPAC Total Score: 23      Education:    Patient left up in chair with all lines intact, call button in reach and RN notified    GOALS:   Multidisciplinary Problems     Occupational Therapy Goals        Problem: Occupational Therapy Goal    Goal Priority Disciplines Outcome Interventions   Occupational Therapy Goal     OT, PT/OT Ongoing, Progressing    Description:  Goals to be met by: 12/19/19     Patient will increase functional independence with ADLs by performing:    UE Dressing with [place].  LE Dressing with Modified [place].  Grooming while standing at sink with Modified [place].  Toileting from bedside commode with Modified [place] for hygiene and clothing management.   Bathing from  shower chair/bench with Modified [place].  Toilet transfer to bedside commode with Modified  [place].                      History:     Past Medical History:   Diagnosis Date    Arthritis     Elevated PSA     Personal history of venous thrombosis and embolism     After trauma in the 70's, he had a DVT with a PE    Pulmonary embolism     Sleep apnea        Past Surgical History:   Procedure Laterality Date    ADENOIDECTOMY      ADENOIDECTOMY      APPENDECTOMY      ELBOW SURGERY  05/2016    EYE SURGERY      Eyelid    KNEE SURGERY      left knee    periodontal  03/2018    PROSTATE BIOPSY      TONSILLECTOMY         Time Tracking:     OT Date of Treatment: 12/18/19  OT Start Time: 1325  OT Stop Time: 1350  OT Total Time (min): 25 min    Billable Minutes:Evaluation 15  Self Care/Home Management 10    MARY Easley  12/18/2019

## 2019-12-18 NOTE — ANESTHESIA PROCEDURE NOTES
Spinal    Diagnosis: right knee pain  Patient location during procedure: OR  Start time: 12/18/2019 6:59 AM  Timeout: 12/18/2019 6:59 AM  End time: 12/18/2019 7:01 AM    Staffing  Authorizing Provider: Marilee Marie CRNA  Performing Provider: Marilee Marie CRNA    Preanesthetic Checklist  Completed: patient identified, site marked, surgical consent, pre-op evaluation, timeout performed, IV checked, risks and benefits discussed and monitors and equipment checked  Spinal Block  Patient position: sitting  Prep: ChloraPrep  Patient monitoring: heart rate, cardiac monitor, continuous pulse ox and frequent blood pressure checks  Approach: midline  Location: L3-4  Injection technique: single shot  CSF Fluid: clear free-flowing CSF and blood-tinged free-flowing CSF  Needle  Needle type: Chava   Needle gauge: 25 G  Needle length: 3.5 in  Additional Documentation: incremental injection, negative aspiration for heme and no paresthesia on injection  Needle localization: anatomical landmarks  Assessment  Sensory level: T8   Dermatomal levels determined by alcohol wipe  Ease of block: easy  Patient's tolerance of the procedure: comfortable throughout block and no complaints

## 2019-12-18 NOTE — PLAN OF CARE
Pt resting in room at this time, wife at bedside. Pt tolerated PO. Pt able to bend non-operative knee. Pt able to plantar flex and dorsi flex bilateral feet. Pt states pain at tolerable level. Will continue to monitor and update pt.

## 2019-12-18 NOTE — PLAN OF CARE
Patient tolerated PT session well. He ambulated 80ft with SW and CGA. No LOB or SOB noted. He is okay to ambulate with nursing staff assistance and SW.       Problem: Physical Therapy Goal  Goal: Physical Therapy Goal  Description  Goals to be met by: 19    Patient will increase functional independence with mobility by performin. Sit to stand transfer with Supervision  2. Gait x300 feet with Supervision using Rolling Walker  3. Ascend/descend 4 stairs with bilateral handrails and Stand-by Assistance   4. Lower extremity exercise program x30 reps per handout, with supervision     Outcome: Ongoing, Progressing

## 2019-12-18 NOTE — NURSING TRANSFER
Nursing Transfer Note      12/18/2019     Transfer To: 302    Transfer via bed    Transfer with CO2 monitor     Transported by RN, PCT    Medicines sent: durga LIMA    Chart send with patient: Yes    Notified: spouse    Patient reassessed at: 1000 12/18/19

## 2019-12-18 NOTE — PLAN OF CARE
Pt ready for transfer to admit room. Attempted to call report to floor RN and states will call back

## 2019-12-18 NOTE — ANESTHESIA PROCEDURE NOTES
Right adductor canal catheter    Patient location during procedure: pre-op   Block not for primary anesthetic.  Reason for block: at surgeon's request and post-op pain management   Post-op Pain Location: right knee pain  Start time: 12/18/2019 6:25 AM  Timeout: 12/18/2019 6:25 AM   End time: 12/18/2019 6:40 AM    Staffing  Authorizing Provider: Tiana Quezada MD  Performing Provider: Tiana Quezada MD    Preanesthetic Checklist  Completed: patient identified, site marked, surgical consent, pre-op evaluation, timeout performed, IV checked, risks and benefits discussed and monitors and equipment checked  Peripheral Block  Patient position: supine  Prep: ChloraPrep and site prepped and draped  Patient monitoring: heart rate, cardiac monitor, continuous pulse ox, continuous capnometry and frequent blood pressure checks  Block type: adductor canal  Laterality: right  Injection technique: continuous  Needle  Needle type: Tuohy   Needle gauge: 17 G  Needle length: 3.5 in  Needle localization: anatomical landmarks and ultrasound guidance  Catheter type: spring wound  Catheter size: 19 G  Test dose: lidocaine 1.5% with Epi 1-to-200,000 and negative   -ultrasound image captured on disc.  Assessment  Injection assessment: negative aspiration, negative parasthesia and local visualized surrounding nerve  Paresthesia pain: none  Heart rate change: no  Slow fractionated injection: yes  Additional Notes  VSS.  [facility] RN monitoring vitals throughout procedure.  Patient tolerated procedure well.     With 1;[id number] epi

## 2019-12-18 NOTE — ANESTHESIA POSTPROCEDURE EVALUATION
Anesthesia Post Evaluation    Patient: Frank Gay Jr.    Procedure(s) Performed: Procedure(s) (LRB):  ARTHROPLASTY, KNEE, TOTAL-DEPUY-SIGMA (Right)    Final Anesthesia Type: spinal    Patient location during evaluation: PACU  Patient participation: Yes- Able to Participate  Level of consciousness: awake and alert  Post-procedure vital signs: reviewed and stable  Pain management: adequate  Airway patency: patent    PONV status at discharge: No PONV  Anesthetic complications: no      Cardiovascular status: hemodynamically stable  Respiratory status: room air, spontaneous ventilation and unassisted  Hydration status: euvolemic  Follow-up not needed.          Vitals Value Taken Time   /59 12/18/2019  9:32 AM   Temp 36.5 °C (97.7 °F) 12/18/2019  9:22 AM   Pulse 52 12/18/2019  9:45 AM   Resp 18 12/18/2019  9:45 AM   SpO2 95 % 12/18/2019  9:45 AM   Vitals shown include unvalidated device data.      No case tracking events are documented in the log.      Pain/Bill Score: Pain Rating Prior to Med Admin: 0 (12/18/2019  9:32 AM)  Bill Score: 9 (12/18/2019  9:30 AM)

## 2019-12-18 NOTE — PT/OT/SLP EVAL
Physical Therapy Evaluation and Treatment     Patient Name:  Frank Gay Jr.   MRN:  [id number]    Recommendations:     Discharge Recommendations:  outpatient PT (12/20/19)   Discharge Equipment Recommendations: none   Barriers to discharge: Inaccessible home    Assessment:     Frank Gay Jr. is a 64 y.o. male admitted with a medical diagnosis of Primary osteoarthritis of right knee.  He presents with the following impairments/functional limitations:  weakness, impaired functional mobilty, gait instability, impaired balance, decreased lower extremity function, pain, impaired skin, orthopedic precautions, decreased ROM, impaired joint extensibility.    Patient tolerated PT session well. He ambulated 80ft with SW and CGA. No LOB or SOB noted. He is okay to ambulate with nursing staff assistance and SW.     Rehab Prognosis: Good; patient would benefit from acute skilled PT services to address these deficits and reach maximum level of function.    Recent Surgery: Procedure(s) (LRB):  ARTHROPLASTY, KNEE, TOTAL-DEPUY-SIGMA (Right) Day of Surgery    Plan:     During this hospitalization, patient to be seen daily to address the identified rehab impairments via gait training, therapeutic activities, therapeutic exercises and progress toward the following goals:    · Plan of Care Expires:  12/25/19    Subjective     Chief Complaint: Pain in right knee.   Patient/Family Comments/goals: To walk without pain.   Pain/Comfort:  · Pain Rating 1: 2/10  · Location - Side 1: Right  · Location - Orientation 1: generalized  · Location 1: knee  · Pain Addressed 1: Pre-medicate for activity, Reposition, Distraction, Cessation of Activity, Nurse notified    Living Environment:  Patient lives with his wife in a [facility] with 5 steps and B HR's to enter. Prior to admission, patients level of function was independent for functional mobility. Equipment used at home: bedside commode, shower chair, cane, straight, crutches, walker, standard.  Upon discharge, patient will have assistance from wife.    Objective:     Communicated with RN prior to session.  Patient found up in chair with FCD, perineural catheter, peripheral IV, cryotherapy  upon PT entry to room.    General Precautions: Standard, fall   Orthopedic Precautions:RLE weight bearing as tolerated   Braces: N/A     Exams:  · Cognitive Exam:  Patient is oriented to Person, Place, Time and Situation  · Sensation:    · -       Intact  · RLE ROM: WFL hip and ankle but limited at knee due to recent surgery   · RLE Strength: WFL hip and ankle but limited at knee due to surgical pain   · LLE ROM: WFL  · LLE Strength: WFL    Functional Mobility:  · Transfers:     · Sit to Stand:  Stand by assistance with rolling walker and verbal cues for hand placement to stand from low bedside chair   · Gait:  Patient ambulated 80ft with standard walker and CGA using 3-point gait. Patient demonstrated decreased nick, decreased velocity of limb motion and decreased step length during gait due to pain, decreased ROM and decreased strength. Verbal cues for gait sequence and SW management.     Therapeutic Activities and Exercises:  Patient educated in:  -PT role and POC  -safety with transfers including hand placement  -gait sequencing and SW management  -OOB activity to maximize recovery including ambulating with nursing staff assistance and SW    AM-PAC 6 CLICK MOBILITY  Total Score:20     Patient left up in chair with all lines intact, call button in reach and RN notified.    GOALS:   Multidisciplinary Problems     Physical Therapy Goals        Problem: Physical Therapy Goal    Goal Priority Disciplines Outcome Goal Variances Interventions   Physical Therapy Goal     PT, PT/OT Ongoing, Progressing     Description:  Goals to be met by: 19    Patient will increase functional independence with mobility by performin. Sit to stand transfer with Supervision  2. Gait x300 feet with Supervision using Rolling  Walker  3. Ascend/descend 4 stairs with bilateral handrails and Stand-by Assistance   4. Lower extremity exercise program x30 reps per handout, with supervision                      History:     Past Medical History:   Diagnosis Date    Arthritis     Elevated PSA     Personal history of venous thrombosis and embolism     After trauma in the 70's, he had a DVT with a PE    Pulmonary embolism     Sleep apnea        Past Surgical History:   Procedure Laterality Date    ADENOIDECTOMY      ADENOIDECTOMY      APPENDECTOMY      ELBOW SURGERY  05/2016    EYE SURGERY      Eyelid    KNEE SURGERY      left knee    periodontal  03/2018    PROSTATE BIOPSY      TONSILLECTOMY         Time Tracking:     PT Received On: 12/18/19  PT Start Time: 1432     PT Stop Time: 1453  PT Total Time (min): 21 min     Billable Minutes: Evaluation 11 and Gait Training 10      Pao Chanel, PT  12/18/2019

## 2019-12-18 NOTE — TRANSFER OF CARE
Anesthesia Transfer of Care Note    Patient: Frank Gay Jr.    Procedure(s) Performed: Procedure(s) (LRB):  ARTHROPLASTY, KNEE, TOTAL-DEPUY-SIGMA (Right)    Patient location: PACU    Anesthesia Type: regional    Transport from OR: Transported from OR on room air with adequate spontaneous ventilation    Post pain: adequate analgesia    Post assessment: no apparent anesthetic complications    Post vital signs: stable    Level of consciousness: awake and sedated    Nausea/Vomiting: no nausea/vomiting    Complications: none    Transfer of care protocol was followed      Last vitals:   Visit Vitals  BP (!) 106/56 (BP Location: Right arm, Patient Position: Lying)   Pulse (!) 46   Temp 36.5 °C (97.7 °F) (Oral)   Resp 11   SpO2 96%

## 2019-12-18 NOTE — OP NOTE
Seven Right TKA  OPERATIVE NOTE    Date of Operation:   12/18/2019    Providers Performing:   Surgeon(s):  Ken Amezcua III, MD  Assistant: Jb Hannah MD    Operative Procedure:   Right Total Knee Arthroplasty, CPT 08530    Preoperative Diagnosis:   Right Knee Osteoarthritis, ICD-10 M17.11    Postoperative Diagnosis:   SAME  Dysplastic lateral condyle    Components Used:   Depuy  Femur: Sigma PS Size 4  Tibia: Sigma fixed bearing PS Size 4  Patella: Sigma Oval Dome 38 mm  Tibial Insert: Sigma fixed bearing PS inset size 8 mm  2 packs cement: Simplex    Implant Name Type Inv. Item Serial No.  Lot No. LRB No. Used Action   CEMENT BONE WHOLE BATCH - [id number]  CEMENT BONE WHOLE BATCH  [employer] [id number] Right 2 Implanted   TRAY TIBIAL CEMENT SZ 4 [place] - [id number]  TRAY TIBIAL CEMENT SZ 4 COBALT  [employer] [id number] Right 1 Implanted   COMPONENT FEM PS ROALN SZ4 R - [id number]  COMPONENT FEM PS ROLAN SZ4 R  [employer] [id number] Right 1 Implanted   PATELLA OVAL 38MM - [id number]  PATELLA OVAL 38MM  [employer] [id number] Right 1 Implanted   INSERT TIB STBL SIGMA SZ4 8MM - [id number]  INSERT TIB STBL SIGMA SZ4 8MM  [employer] [id number] Right 1 Implanted       Anesthesia:   Spinal  Adductor canal block with catheter  ROSALIE TASIA    Estimated Blood Loss:   200 cc    Specimens:   none    Complications:   None    Indications:   The patient has failed non-operative measures including medications, injections and activity modifications for their knee.  After an explanation of risks and benefits of knee arthroplasty surgery, the patient wishes to proceed with surgery.    Operative Notes:   The patient was greeted in the pre-op holding area.  The patients knee was marked with a surgical marker by the surgeon.  Spinal-Epidural anesthesia was administered by the anesthesia team.  The patient was placed in the supine position on the OR table with all bony prominences padded.  A tourniquet was not used.  IV  antibiotics were administered.  The leg was prepped and draped in the usual sterile fashion.  A timeout was performed and the correct patient, site and procedure were identified.    A midline incision was made with a standard medical parapatellar arthrotomy.  The standard medial capsular release was performed along with the lateral gutter.  The patella was mobilized from the lateral parapatellar ligament and bony osteophytes were removed.  The intercondylar notch was cleared of the ACL/PCL.    The extramedullary tibial alignment guide was placed in the appropriate slope and varus/valgus orientation and the proximal cutting block secured by pins.  Measured resection with a 8mm cut was accounted for from the high side of the plateau, perpendicular to the ankle.  The cut was made with an oscillating saw.  We then obtained access to the femoral canal with the stepped drill.  The intramedullary johnathan was placed in 6 degrees of valgus with a 9mm planned resection.  Our distal femoral cuts were made. This resulted in a minimal lateral condyle cut and a tight extension gap so we cut 2 more mm of the distal femur.   The knee was placed in extension and the medical and lateral meniscal remnants removed.  A spacer block was placed with the knee in extension checking for alignment and balance which we were happy with.    The knee was then brought into flexion and the distal femoral gap assessed for appropriate rotation.  Our distal femoral sizing guide was placed and sized appropriately.  Guide pins were placed in the appropriate external rotation.  This was assessed with the 4 in 1 cutting block and the flexion gap sizing block which was appropriate.  The distal femoral preparation was completed after the anterior, posterior and chamfer cuts were made.  The box cutting guide was placed and assessed.  The box cut was made.    At this time the trial implants were placed and knee assessed for stability, and flexion arc. The patella  was prepared using free-hand technique and the three-holed lug drill guide was sized and appropriately assessed. Patella tracking was found to be acceptable. The tibial component rotation was marked. The trials were removed. The tibial component was positioned and the keel was drilled and punched.    The wound was copiously irrigated with pulsitile lavage and the bony surfaces dried.  Cement was mixed on the back table and applied to all components.  Cementation of the femoral, tibial and patellar components was performed sequentially with removal of all excess cement. A trial insert was placed to provide compression while the cement dried and a 0.35% betadine solution was left to soak in the surgical field.     After the cement was dry, the trial poly insert was removed. Hemostasis was obtained with bovie electrocautery.  The knee was again copiously irrigated with pulsatile lavage. The final polyethylene insert was placed and the knee reduced.      1 gram of vancomycin powder was placed in the knee. The arthrotomy was closed with interrupted #1 vicryl suture and #2 quill, the subcuticular layer was closed with 2-0 vicryl suture and a running 4-0 monocryl was used to closed the skin surface.  Surgicel sealant was placed over the top of the incision and sterile dressing placed over the wound.    Sponge and needle counts were correct.    The first-assistant was critical to all steps of the operation, including retraction and leg stabilization during exposure and bone preparation, as well as the deep and superficial wound closure.  Dr. Amezcua performed and/or supervised the key portions of this surgical procedure, including evaluation of the bone cuts, reshaping of the bony elements, and insertion of the provisional and final components.    Postoperative Plan:  The patient will be anticoagulated with 81mg aspirin x1 dose 12 hrs post op, Eliquis 2.5mg BID starting 24hrs post op x30 days.   They will receive 24 hours of  post-operative antibiotics.    Activity will be weight bearing as tolerated.    Signed by: Ken Amezcua III, MD

## 2019-12-19 ENCOUNTER — TELEPHONE (OUTPATIENT)
Dept: ANESTHESIOLOGY | Facility: HOSPITAL | Age: 64
End: 2019-12-19

## 2019-12-19 ENCOUNTER — PATIENT MESSAGE (OUTPATIENT)
Dept: ADMINISTRATIVE | Facility: OTHER | Age: 64
End: 2019-12-19

## 2019-12-19 VITALS
TEMPERATURE: 98 F | DIASTOLIC BLOOD PRESSURE: 60 MMHG | BODY MASS INDEX: 29 KG/M2 | WEIGHT: 226 LBS | HEIGHT: 74 IN | SYSTOLIC BLOOD PRESSURE: 128 MMHG | HEART RATE: 53 BPM | RESPIRATION RATE: 18 BRPM | OXYGEN SATURATION: 97 %

## 2019-12-19 PROCEDURE — 97116 GAIT TRAINING THERAPY: CPT

## 2019-12-19 PROCEDURE — 63600175 PHARM REV CODE 636 W HCPCS: Performed by: PHYSICIAN ASSISTANT

## 2019-12-19 PROCEDURE — 99231 PR SUBSEQUENT HOSPITAL CARE,LEVL I: ICD-10-PCS | Mod: ,,, | Performed by: ANESTHESIOLOGY

## 2019-12-19 PROCEDURE — 94799 UNLISTED PULMONARY SVC/PX: CPT

## 2019-12-19 PROCEDURE — 99231 SBSQ HOSP IP/OBS SF/LOW 25: CPT | Mod: ,,, | Performed by: ANESTHESIOLOGY

## 2019-12-19 PROCEDURE — 25000003 PHARM REV CODE 250: Performed by: STUDENT IN AN ORGANIZED HEALTH CARE EDUCATION/TRAINING PROGRAM

## 2019-12-19 PROCEDURE — 99900035 HC TECH TIME PER 15 MIN (STAT)

## 2019-12-19 PROCEDURE — 97110 THERAPEUTIC EXERCISES: CPT

## 2019-12-19 PROCEDURE — 94761 N-INVAS EAR/PLS OXIMETRY MLT: CPT

## 2019-12-19 PROCEDURE — 25000003 PHARM REV CODE 250: Performed by: PHYSICIAN ASSISTANT

## 2019-12-19 PROCEDURE — 97535 SELF CARE MNGMENT TRAINING: CPT

## 2019-12-19 RX ADMIN — ROPIVACAINE HYDROCHLORIDE: 2 INJECTION, SOLUTION EPIDURAL; INFILTRATION at 10:12

## 2019-12-19 RX ADMIN — APIXABAN 2.5 MG: 2.5 TABLET, FILM COATED ORAL at 09:12

## 2019-12-19 RX ADMIN — ACETAMINOPHEN 1000 MG: 500 TABLET ORAL at 05:12

## 2019-12-19 RX ADMIN — SENNOSIDES AND DOCUSATE SODIUM 1 TABLET: 8.6; 5 TABLET ORAL at 08:12

## 2019-12-19 RX ADMIN — CELECOXIB 200 MG: 200 CAPSULE ORAL at 08:12

## 2019-12-19 RX ADMIN — OXYCODONE HYDROCHLORIDE 5 MG: 5 TABLET ORAL at 08:12

## 2019-12-19 RX ADMIN — FAMOTIDINE 20 MG: 20 TABLET ORAL at 08:12

## 2019-12-19 RX ADMIN — MUPIROCIN 1 G: 20 OINTMENT TOPICAL at 08:12

## 2019-12-19 NOTE — DISCHARGE SUMMARY
[facility]  Orthopedics  Discharge Summary      Patient Name: Frank Gay Jr.  MRN: [id number]  Admission Date: 12/18/2019  Hospital Length of Stay: 1 days  Discharge Date and Time: 12/19/2019 11:10 AM  Attending Physician: No att. providers found   Discharging Provider: Jb Hannah MD  Primary Care Provider: Zeeshan Barron MD    HPI: CC: Right knee pain     Farnk Gay Jr. is a 64 y.o. male with 2 year history of Right knee pain. Pain is worse with activity and weight bearing.  Patient has experienced interference of activities of daily living due to decreased range of motion and an increase in joint pain and swelling.  Patient has failed non-operative treatment including NSAIDs, corticosteroid injections, and activity modification.  Frank Gay Jr. currently ambulates using a cane. Patient had L TKA in 2013 by Dr. Moreau and did well.      Relevant medical conditions of significance in perioperative period:  DVT/PE after trauma in the 1970s  LILIAN: cannot tolerate CPAP, had surgery which helped somewhat        Procedure(s) (LRB):  ARTHROPLASTY, KNEE, TOTAL-DEPUY-SIGMA (Right)      Hospital Course: Patient presented for above procedure.  Tolerated it well and was discharged home POD1 after voiding, tolerating diet, ambulating, pain controlled.  Discharge instructions, follow-up appointment, and med rec are below.          Significant Diagnostic Studies: Labs: All labs within the past 24 hours have been reviewed    Pending Diagnostic Studies:     None        Final Active Diagnoses:    Diagnosis Date Noted POA    PRINCIPAL PROBLEM:  Status post total right knee replacement [Z96.651] 04/16/2013 Not Applicable    Primary osteoarthritis of right knee [M17.11] 12/18/2019 Yes      Problems Resolved During this Admission:      Discharged Condition: good    Disposition: Home or Self Care    Follow Up:  Follow-up Information     Ailyn Riggins PA-C On 1/2/2020.    Specialty:   Orthopedic Surgery  Contact information:  [address]  [facility] [id number]  [phone]                 Patient Instructions:      Diet Adult Regular     Notify your health care provider if you experience any of the following:  temperature >100.4     Notify your health care provider if you experience any of the following:  persistent nausea and vomiting or diarrhea     Notify your health care provider if you experience any of the following:  severe uncontrolled pain     Notify your health care provider if you experience any of the following:  redness, tenderness, or signs of infection (pain, swelling, redness, odor or green/yellow discharge around incision site)     Notify your health care provider if you experience any of the following:  difficulty breathing or increased cough     Notify your health care provider if you experience any of the following:  severe persistent headache     Notify your health care provider if you experience any of the following:  persistent dizziness, light-headedness, or visual disturbances     Notify your health care provider if you experience any of the following:  worsening rash     Notify your health care provider if you experience any of the following:  increased confusion or weakness     Leave dressing on - Keep it clean, dry, and intact until clinic visit   Order Comments: Do not remove surgical dressing for 2 weeks post-op. This will be done only by MD at initial post-op visit.     Please call the office if there is any fluid/liquid/drainage on the white guaze. It is ok to shower when you get home if seated in a shower chair, don't stand and fall in the shower. No soaking in a tub or pool for one month.     Activity as tolerated     Call MD for:  difficulty breathing, headache or visual disturbances     Call MD for:  extreme fatigue     Call MD for:  hives     Call MD for:  persistent dizziness or light-headedness     Call MD for:  persistent nausea and vomiting     Call  MD for:  redness, tenderness, or signs of infection (pain, swelling, redness, odor or green/yellow discharge around incision site)     Call MD for:  severe uncontrolled pain     Call MD for:  temperature >100.4     Keep surgical extremity elevated     Leave dressing on - Keep it clean, dry, and intact until clinic visit   Order Comments: Do not remove surgical dressing for 2 weeks post-op. This will be done only by MD at initial post-op visit. If dressing is completely saturated, replace with identical dressing - silver-impregnated hydrocolloid dressing.     Do not get dressings wet. Do not shower.     If dressing continues to be saturated or there are signs of infection, please call [facility] [phone] for further instructions and to make appt to be seen.     Lifting restrictions   Order Comments: No strenuous exercise or lifting of > 10 lbs     No driving, operating heavy equipment or signing legal documents while taking pain medication     Sponge bath only until clinic visit     Weight bearing as tolerated     Weight bearing restrictions (specify):   Order Comments: WBAT     Medications:  Reconciled Home Medications:      Medication List      CONTINUE taking these medications    acetaminophen 650 MG Tbsr  Commonly known as:  TYLENOL  Take 650 mg by mouth 2 (two) times daily as needed.     aspirin 81 MG EC tablet  Commonly known as:  ECOTRIN  Take 81 mg by mouth once daily.     celecoxib 200 MG capsule  Commonly known as:  CeleBREX  Take 1 capsule (200 mg total) by mouth once daily.     cyanocobalamin 100 MCG tablet  Commonly known as:  VITAMIN B-12  Take 100 mcg by mouth once daily.     Eliquis 2.5 mg Tab  Generic drug:  apixaban  Take 1 tablet (2.5 mg total) by mouth 2 (two) times daily.     ergocalciferol 50,000 unit Cap  Commonly known as:  ERGOCALCIFEROL  Take 1 capsule (50,000 Units total) by mouth every 7 days.     hydrocortisone 1 % ointment  Apply topically as needed (Itching).     ibuprofen 200 MG  tablet  Commonly known as:  ADVIL,MOTRIN  Take 400 mg by mouth every 6 (six) hours as needed for Pain.     lactase 3,000 unit tablet  Commonly known as:  LACTAID  Take 1 tablet by mouth 3 (three) times daily with meals.     oxyCODONE 5 MG immediate release tablet  Commonly known as:  ROXICODONE  Take 1-2 tabs by mouth every 4-6 hours as needed for pain     Stool Softener 100 MG capsule  Generic drug:  docusate sodium  Take 1 capsule (100 mg total) by mouth 2 (two) times daily as needed for Constipation.            Jb Hannah MD  Orthopedics  [facility]

## 2019-12-19 NOTE — SUBJECTIVE & OBJECTIVE
"Principal Problem:Status post total right knee replacement    Principal Orthopedic Problem: same    Interval History: Pt seen and examined at bedside. ADOLFO. She reports pain is controlled. Pt ambulated 80 ft with PT yesterday. Plans to work with PT today.       Review of patient's allergies indicates:   Allergen Reactions    Dairy aid  [lactase]      Other reaction(s): stomach cramps  Other reaction(s): Itching  Other reaction(s): Diarrhea    Influenza a (h1n1) vac 09 (pf)     Lactose        Current Facility-Administered Medications   Medication    0.9%  NaCl infusion    acetaminophen tablet 1,000 mg    apixaban tablet 2.5 mg    bisacodyl suppository 10 mg    celecoxib capsule 200 mg    famotidine tablet 20 mg    methocarbamol tablet 500 mg    morphine injection 2 mg    morphine injection 2 mg    mupirocin 2 % ointment 1 g    naloxone 0.4 mg/mL injection 0.02 mg    ondansetron injection 4 mg    oxyCODONE immediate release tablet 10 mg    oxyCODONE immediate release tablet 5 mg    polyethylene glycol packet 17 g    pregabalin capsule 75 mg    promethazine (PHENERGAN) 6.25 mg in dextrose 5 % 50 mL IVPB    ropivacaine (PF) 2 mg/ml (0.2%) infusion    ropivacaine 0.2% ON-Q C-BLOC 400 ML (SELECT A FLOW)    senna-docusate 8.6-50 mg per tablet 1 tablet     Objective:     Vital Signs (Most Recent):  Temp: 97.6 °F (36.4 °C) (12/19/19 0331)  Pulse: (!) 53 (12/19/19 0333)  Resp: 18 (12/19/19 0333)  BP: (!) 125/58 (12/19/19 0331)  SpO2: 97 % (12/19/19 0333) Vital Signs (24h Range):  Temp:  [97.6 °F (36.4 °C)-97.9 °F (36.6 °C)] 97.6 °F (36.4 °C)  Pulse:  [42-70] 53  Resp:  [11-20] 18  SpO2:  [94 %-100 %] 97 %  BP: (101-129)/(55-74) 125/58     Weight: 102.5 kg (226 lb)  Height: 6' 2" (188 cm)  Body mass index is 29.02 kg/m².      Intake/Output Summary (Last 24 hours) at 12/19/2019 0544  Last data filed at 12/19/2019 0500  Gross per 24 hour   Intake 6972.3 ml   Output 600 ml   Net 6372.3 ml       Ortho/SPM " Exam    AAOx4  NAD  Reg rate  No increased WOB  Dressing c/d/i  Polar ice in place  SILT T/SP/DP/Seay/Sa  Motor intact T/SP/DP  WWP extremities  FCDs in place and functioning      Significant Labs: All pertinent labs within the past 24 hours have been reviewed.     Significant Imaging: I have reviewed and interpreted all pertinent imaging results/findings.   XR R knee shows R TKA in place with good alignment

## 2019-12-19 NOTE — NURSING
Discharge teaching and ON Q ball teaching complete with patient and pt spouse. Pt awake, alert oriented, verbalized understanding of instructions, NAD. Pt and spouse confirms having home walker, RX medications and bedside commode.

## 2019-12-19 NOTE — PROGRESS NOTES
Acute Pain Service and Perioperative Surgical Home Progress Note    HPI  Frank Gay Jr. is a 64 y.o., male, hx of LILIAN and PE POD#1 s/p right TKA. No acute events overnight.     Interval history    Surgery:  Procedure(s) (LRB):  ARTHROPLASTY, KNEE, TOTAL-DEPUY-SIGMA (Right)    Post Op Day #: 1    Catheter type: Perineural Adductor Canal    Infusion type: Ropivacaine 0.2%  8 ml/hr basal    Problem List:    Active Hospital Problems    Diagnosis  POA    *Status post total right knee replacement [Z96.651]  Not Applicable    Primary osteoarthritis of right knee [M17.11]  Yes      Resolved Hospital Problems   No resolved problems to display.       Subjective:     General appearance of alert, oriented, no complaints   Pain with rest: 0 on number scale   Pain with movement: 4    Numbers   Side Effects    1. Pruritis No    2. Nausea No    3. Motor Blockade No, 0=Ability to raise lower extremities off bed    4. Sedation No, 1=awake and alert    Schedule Medications:    acetaminophen  1,000 mg Oral Q6H    apixaban  2.5 mg Oral BID    celecoxib  200 mg Oral Daily    famotidine  20 mg Oral BID    mupirocin  1 g Nasal BID    polyethylene glycol  17 g Oral Daily    pregabalin  75 mg Oral QHS    senna-docusate 8.6-50 mg  1 tablet Oral BID        Continuous Infusions:   sodium chloride 0.9% 150 mL/hr at 12/18/19 2015    ropivacaine (PF) 2 mg/ml (0.2%) 8 mL/hr (12/18/19 1939)    ropivacaine          PRN Medications:  bisacodyl, methocarbamol, morphine, morphine, naloxone, ondansetron, oxyCODONE, oxyCODONE, promethazine (PHENERGAN) IVPB, ropivacaine       Antibiotics:  Antibiotics (From admission, onward)    Start     Stop Route Frequency Ordered    12/18/19 1115  mupirocin 2 % ointment 1 g      12/23 0859 Nasl 2 times daily 12/18/19 1107           Objective:     Catheter site clean, dry, intact        Vital Signs (Most Recent):  Temp: 97.6 °F (36.4 °C) (12/19/19 0331)  Pulse: (!) 53 (12/19/19 0333)  Resp: 18  (12/19/19 0333)  BP: (!) 125/58 (12/19/19 0331)  SpO2: 97 % (12/19/19 0333) Vital Signs Range (Last 24H):  Temp:  [97.6 °F (36.4 °C)-97.9 °F (36.6 °C)]   Pulse:  [42-70]   Resp:  [11-20]   BP: (101-136)/(55-74)   SpO2:  [94 %-100 %]          I & O (Last 24H):    Intake/Output Summary (Last 24 hours) at 12/19/2019 0540  Last data filed at 12/19/2019 0500  Gross per 24 hour   Intake 6972.3 ml   Output 600 ml   Net 6372.3 ml       Physical Exam:    GA: Alert, comfortable, no acute distress.   Pulmonary: Clear to auscultation A/P/L. No wheezing, crackles, or rhonchi.  Cardiac: RRR S1 & S2 w/o rubs/murmurs/gallops.   Abdominal:Bowel sounds present. No tenderness to palpation or distension. No appreciable hepatosplenomegaly.   Skin: No jaundice, rashes, or visible lesions.       Laboratory:  CBC: No results for input(s): WBC, RBC, HGB, HCT, PLT, MCV, MCH, MCHC in the last 72 hours.    BMP: No results for input(s): NA, K, CO2, CL, BUN, CREATININE, GLU, MG, PHOS, CALCIUM in the last 72 hours.    No results for input(s): PT, INR, PROTIME, APTT in the last 72 hours.      Anticoagulant dose Aspirin 81mg on 12/18/19 at 2012.    Assessment:  Frank Gay Jr. is a 64 y.o., male, hx of LILIAN and PE POD#1 s/p right TKA.      Pain control adequate- oxycodone 10mg last given at 1543 yesterday    Plan:     1) Pain:    Adductor canal perineural catheter in place and infusing. Good level. Multimodal pain regimen with acetaminophen, Celebrex, Lyrica, and prn oxycodone given  Will continue to monitor. Plan to discharge with On-Q on POD #1.   2) LILIAN- pt does not wear his CPAP at home and did not want to wear a CPAP while admitted- he had episodes of bradycardia but remained hemodynamically stable throughout the night- pt with a history of bradycardia per EKG history   3) Hx of PE- apixaban will be started this am for prophylaxis   4) FEN/GI: Tolerating regular diet.   5) Dispo: Pt working well with PT/OT. Case management and SW for  placement. Plan for discharge POD #1.        Evaluator GOPAL Estrella

## 2019-12-19 NOTE — PT/OT/SLP PROGRESS
Physical Therapy Treatment and Discharge     Patient Name:  Frank Gay Jr.   MRN:  [id number]    Recommendations:     Discharge Recommendations:  outpatient PT(12/20/19)   Discharge Equipment Recommendations: none   Barriers to discharge: None    Assessment:     Frank Gay Jr. is a 64 y.o. male admitted with a medical diagnosis of Status post total right knee replacement.  He presents with the following impairments/functional limitations:  weakness, impaired functional mobilty, gait instability, impaired balance, decreased lower extremity function, pain, impaired skin, orthopedic precautions, decreased ROM, impaired joint extensibility.    Patient tolerated PT session well. He ambulated 100ft x2 trials with SW and supervision. No LOB or SOB noted. He ascended/descended 4 steps with B HR's and SBA. He performed R LE therex 2x10. He has OPPT on 12/20/19.     Rehab Prognosis: Good; patient would benefit from acute skilled PT services to address these deficits and reach maximum level of function.    Recent Surgery: Procedure(s) (LRB):  ARTHROPLASTY, KNEE, TOTAL-DEPUY-SIGMA (Right) 1 Day Post-Op    Plan:     During this hospitalization, patient to be seen daily to address the identified rehab impairments via gait training, therapeutic activities, therapeutic exercises and progress toward the following goals:    · Plan of Care Expires:  12/25/19    Subjective     Subjective:  Not having any pain.   Patient/Family Comments/goals: To get back to PLOF.   Pain/Comfort:  · Pain Rating 1: 0/10      Objective:     Communicated with RN prior to session.  Patient found up in chair with FCD, cryotherapy, perineural catheter upon PT entry to room.     General Precautions: Standard, fall   Orthopedic Precautions:RLE weight bearing as tolerated   Braces: N/A     Functional Mobility:  · Mat Mobility:     · Supine to Sit: modified independence  · Sit to Supine: modified independence  · Transfers:     · Sit to Stand:  supervision  with rolling walker x1 from bedside chair and x1 from mat with verbal cues for hand placement   · Gait: Patient ambulated 110ft x2 trials with Rolling Walker and supervision using 3-point gait. Patient demonstrated decreased nick, increased time in double stance, decreased velocity of limb motion and decreased step length during gait due to pain, decreased ROM and decreased strength. Verbal cues provided for gait sequence and RW management.   · Stairs:  Pt ascended/descended 4 stair(s) with bilateral handrails with Stand-by Assistance. Verbal cues provided for technique.       AM-PAC 6 CLICK MOBILITY  Turning over in bed (including adjusting bedclothes, sheets and blankets)?: 4  Sitting down on and standing up from a chair with arms (e.g., wheelchair, bedside commode, etc.): 4  Moving from lying on back to sitting on the side of the bed?: 4  Moving to and from a bed to a chair (including a wheelchair)?: 4  Need to walk in hospital room?: 4  Climbing 3-5 steps with a railing?: 4  Basic Mobility Total Score: 24     Therapeutic Activities and Exercises:  Patient educated in and performed R LE exercises 2x10 for ankle pumps, quad set, glute set, SAQ over bolster, heel slides, hip abd/add, SLR, and LAQ.    Patient educated in:  -PT role and POC  -safety with transfers including hand placement  -gait sequencing and RW management  -OOB activity to maximize recovery including ambulating every hour to hour and a half to prevent DVT  -car transfer  -stair training  -HEP for therex at home with handout provided     Patient left up in chair with all lines intact, call button in reach, RN notified and wife present.    GOALS:   Multidisciplinary Problems     Physical Therapy Goals     Not on file          Multidisciplinary Problems (Resolved)        Problem: Physical Therapy Goal    Goal Priority Disciplines Outcome Goal Variances Interventions   Physical Therapy Goal   (Resolved)     PT, PT/OT Met     Description:  Goals to  be met by: 19    Patient will increase functional independence with mobility by performin. Sit to stand transfer with Supervision  2. Gait x300 feet with Supervision using Rolling Walker  3. Ascend/descend 4 stairs with bilateral handrails and Stand-by Assistance   4. Lower extremity exercise program x30 reps per handout, with supervision                       Time Tracking:     PT Received On: 19  PT Start Time: 843     PT Stop Time: 915  PT Total Time (min): 32 min     Billable Minutes: Gait Training  15 and Therapeutic Exercise 17    Treatment Type: Treatment  PT/PTA: PT       Pao Chanel, PT  2019

## 2019-12-19 NOTE — PROGRESS NOTES
[facility]  Orthopedics  Progress Note    Patient Name: Frank Gay Jr.  MRN: [id number]  Admission Date: 12/18/2019  Hospital Length of Stay: 1 days  Attending Provider: Ken Amezcua III, MD  Primary Care Provider: Zeeshan Barron MD  Follow-up For: Procedure(s) (LRB):  ARTHROPLASTY, KNEE, TOTAL-DEPUY-SIGMA (Right)    Post-Operative Day: 1 Day Post-Op  Subjective:     Principal Problem:Status post total right knee replacement    Principal Orthopedic Problem: same    Interval History: Pt seen and examined at bedside. CORBINO. She reports pain is controlled. Pt ambulated 80 ft with PT yesterday. Plans to work with PT today.       Review of patient's allergies indicates:   Allergen Reactions    Dairy aid  [lactase]      Other reaction(s): stomach cramps  Other reaction(s): Itching  Other reaction(s): Diarrhea    Influenza a (h1n1) vac 09 (pf)     Lactose        Current Facility-Administered Medications   Medication    0.9%  NaCl infusion    acetaminophen tablet 1,000 mg    apixaban tablet 2.5 mg    bisacodyl suppository 10 mg    celecoxib capsule 200 mg    famotidine tablet 20 mg    methocarbamol tablet 500 mg    morphine injection 2 mg    morphine injection 2 mg    mupirocin 2 % ointment 1 g    naloxone 0.4 mg/mL injection 0.02 mg    ondansetron injection 4 mg    oxyCODONE immediate release tablet 10 mg    oxyCODONE immediate release tablet 5 mg    polyethylene glycol packet 17 g    pregabalin capsule 75 mg    promethazine (PHENERGAN) 6.25 mg in dextrose 5 % 50 mL IVPB    ropivacaine (PF) 2 mg/ml (0.2%) infusion    ropivacaine 0.2% ON-Q C-BLOC 400 ML (SELECT A FLOW)    senna-docusate 8.6-50 mg per tablet 1 tablet     Objective:     Vital Signs (Most Recent):  Temp: 97.6 °F (36.4 °C) (12/19/19 0331)  Pulse: (!) 53 (12/19/19 0333)  Resp: 18 (12/19/19 0333)  BP: (!) 125/58 (12/19/19 0331)  SpO2: 97 % (12/19/19 0333) Vital Signs (24h Range):  Temp:  [97.6 °F (36.4 °C)-97.9  "°F (36.6 °C)] 97.6 °F (36.4 °C)  Pulse:  [42-70] 53  Resp:  [11-20] 18  SpO2:  [94 %-100 %] 97 %  BP: (101-129)/(55-74) 125/58     Weight: 102.5 kg (226 lb)  Height: 6' 2" (188 cm)  Body mass index is 29.02 kg/m².      Intake/Output Summary (Last 24 hours) at 12/19/2019 0544  Last data filed at 12/19/2019 0500  Gross per 24 hour   Intake 6972.3 ml   Output 600 ml   Net 6372.3 ml       Ortho/SPM Exam    AAOx4  NAD  Reg rate  No increased WOB  Dressing c/d/i  Polar ice in place  SILT T/SP/DP/Seay/Sa  Motor intact T/SP/DP  WWP extremities  FCDs in place and functioning      Significant Labs: All pertinent labs within the past 24 hours have been reviewed.     Significant Imaging: I have reviewed and interpreted all pertinent imaging results/findings.   XR R knee shows R TKA in place with good alignment    Assessment/Plan:     * Status post total right knee replacement  Patient is a 65 yo M POD1 s/p R TKA    Pain control: multimodal per APS  PT/OT: WBAT with walker RLE  DVT PPx: aspirin 81 mg 12 hours post op is complete. Begin eliquis 2.5 mg BID this morning, FCDs at all times when not ambulating  Abx: postop Ancef  Labs: none  Drain: none  Stephane: genevieve     Dispo: today, PT recs outpatient PT          Jb Hannah MD  Orthopedics  [facility]  "

## 2019-12-19 NOTE — PLAN OF CARE
Patient A/Ox4. Patient in bed resting at this time. Bed locked and in the lowest position. Spouse at the bedside. Patient instructed to call for assistance. Call light in reach. Q2 hour rounds made. Noted 20 g to left hand infusing NS at 150, site CDI. Polar Ice in place, place on the right knee. Ropivacaine infusing 8ml/hr. Site CDI. Dressing to the right knee is CDI.  Walker and commode at the bedside. Patient up to the restroom as tolerated. Pain managed with prn pain medication. Safety maintained, no fall noted. Will continue to observe and follow the current plan of care.

## 2019-12-19 NOTE — ASSESSMENT & PLAN NOTE
Patient is a 65 yo M POD1 s/p R TKA    Pain control: multimodal per APS  PT/OT: WBAT RLE  DVT PPx: aspirin 81 mg 12 hours post op is complete. Begin eliquis 2.5 mg BID this morning, FCDs at all times when not ambulating  Abx: postop Ancef  Labs: none  Drain: none  Stephane: dc'd     Dispo: today, PT recs outpatient PT

## 2019-12-19 NOTE — PT/OT/SLP PROGRESS
Occupational Therapy   Treatment/Discharge Note    Name: Frank Gay Jr.  MRN: [id number]  Admitting Diagnosis:  Status post total right knee replacement  1 Day Post-Op    Recommendations:     Discharge Recommendations: home  Discharge Equipment Recommendations:  none  Barriers to discharge:  None    Assessment:     Frank Gay Jr. is a 64 y.o. male with a medical diagnosis of Status post total right knee replacement.  He was able to perform sit/stand T/F c mod I and RW and was able to walk to bathroom c mod I and RW.  Was able to perform UB/LB dressing c mod I and toilet hygiene c mod I.  Pt has met all OT goals and is progressing well. Performance deficits affecting function are impaired self care skills, impaired functional mobilty, orthopedic precautions.     Rehab Prognosis:  Good; patient would benefit from acute skilled OT services to address these deficits and reach maximum level of function.       Plan:     Patient to be seen daily to address the above listed problems via self-care/home management, therapeutic activities, therapeutic exercises  · Plan of Care Expires: 12/19/19  · Plan of Care Reviewed with: patient, spouse    Subjective     Pain/Comfort:  · Pain Rating 1: 0/10    Objective:     Communicated with: RN prior to session.  Patient found up in chair with perineural catheter, cryotherapy upon OT entry to room.    General Precautions: Standard, fall   Orthopedic Precautions:RLE weight bearing as tolerated   Braces: N/A     Occupational Performance:         Functional Mobility/Transfers:  · Patient completed Sit <> Stand Transfer with modified independence  with  rolling walker   · Patient completed Toilet Transfer Stand Pivot technique with modified independence with  rolling walker  · Functional Mobility: Pt was able to walk to bathroom c mod I and RW.    Activities of Daily Living:  · Grooming: modified independence to wash hands while standing at sink.  · Upper Body Dressing: modified  independence to don shirt.  · Lower Body Dressing: modified independence to don underwear, pants, socks, and shoes.  · Toileting: modified independence for toilet hygiene.      [facility] [address] [facility] 6 Click ADL: 24    Treatment & Education:  Educated pt on bathing and car T/F's.    Patient left up in chair with all lines intact, call button in reach, RN notified and wife presentEducation:      GOALS:   Multidisciplinary Problems     Occupational Therapy Goals     Not on file          Multidisciplinary Problems (Resolved)        Problem: Occupational Therapy Goal    Goal Priority Disciplines Outcome Interventions   Occupational Therapy Goal   (Resolved)     OT, PT/OT Met    Description:  Goals to be met by: 12/19/19     Patient will increase functional independence with ADLs by performing:    UE Dressing with [place].  LE Dressing with Modified [place].  Grooming while standing at sink with Modified [place].  Toileting from bedside commode with Modified [place] for hygiene and clothing management.   Bathing from  shower chair/bench with Modified [place].  Toilet transfer to bedside commode with Modified [place].                      Time Tracking:     OT Date of Treatment: 12/19/19  OT Start Time: 1017  OT Stop Time: 1034  OT Total Time (min): 17 min    Billable Minutes:Self Care/Home Management 17    MARY Easley  12/19/2019

## 2019-12-19 NOTE — PLAN OF CARE
Patient tolerated PT session well. He ambulated 100ft x2 trials with SW and supervision. No LOB or SOB noted. He ascended/descended 4 steps with B HR's and SBA. He performed R LE therex 2x10. He has OPPT on 19.     Problem: Physical Therapy Goal  Goal: Physical Therapy Goal  Description  Goals to be met by: 19    Patient will increase functional independence with mobility by performin. Sit to stand transfer with Supervision  2. Gait x300 feet with Supervision using Rolling Walker  3. Ascend/descend 4 stairs with bilateral handrails and Stand-by Assistance   4. Lower extremity exercise program x30 reps per handout, with supervision      2019 1526 by Pao Chanel, PT  Outcome: Met  2019 1526 by Pao Chanel, PT  Reactivated

## 2019-12-19 NOTE — PLAN OF CARE
Problem: Occupational Therapy Goal  Goal: Occupational Therapy Goal  Description  Goals to be met by: 12/19/19     Patient will increase functional independence with ADLs by performing:    UE Dressing with [place].  LE Dressing with Modified [place].  Grooming while standing at sink with Modified [place].  Toileting from bedside commode with Modified [place] for hygiene and clothing management.   Bathing from  shower chair/bench with Modified [place].  Toilet transfer to bedside commode with Modified [place].     Outcome: Met

## 2019-12-20 ENCOUNTER — TELEPHONE (OUTPATIENT)
Dept: ORTHOPEDICS | Facility: CLINIC | Age: 64
End: 2019-12-20

## 2019-12-20 NOTE — NURSING
12/20/19 @ 1300 Patient/caretaker called at home. Pain controlled with OnQ on this time, family member states patient is resting at this time. Patient denies signs of local anesthetic toxicity. Dressing clean , dry, and intact.ed at home. Pt started having spasms yesterday afternoon, patient placed heating pad and started having swelling and pain over night then patient went to emergency room, received morphine from emergency room and was discharged home. Pillow wedge placed for elevation and pain is better today. Encouraged to use pain medication as needed prn.  All questions answered. Encouraged to call if any issues arise

## 2019-12-20 NOTE — TELEPHONE ENCOUNTER
Called patient to f/u on ER visit last night. He reports feeling better since getting home. The ice machine is giving him muscle spasms so he tried a heating pad which helped with the spasms, but caused increased swelling in the leg. He had a dose of IV dilaudid last night and that helped him get caught up on pain medicine. He is resting in his bed now with his leg elevated above his heart. He will start outpatient PT on Monday as he didn't get home until after 530 this morning. He will call me in clinic or the on call orthopedic going forward so that he can avoid going to the ER.

## 2019-12-20 NOTE — TELEPHONE ENCOUNTER
Spoke with patient's wife and she stated he was having increased pain to the posterior aspect of right knee.  She states the OnQ was intact and not leaking.  At the time of our conversation, it had been about 3.5 hours since his last dose of Oxycodone (10 mg @ 8 PM) and almost 6 hours since his last dose of Tylenol (1000 mg @ 6 PM).  I encouraged him to take another dose of Oxycodone and Tylenol and apply additional ice pack to posterior knee.  Wife voiced understanding and was agreeable to the plan; however, a few minutes later, she called the nurses station back and stated they were going to go to the ED. Since they live in [place], the nurse recommended they go to [facility] for further evaluation and pain control.

## 2019-12-21 NOTE — ANESTHESIA POST-OP PAIN MANAGEMENT
Attempted to reach patient by phone, however voicemail not set up.  Will attempt again tomorrow.

## 2019-12-22 ENCOUNTER — NURSE TRIAGE (OUTPATIENT)
Dept: ADMINISTRATIVE | Facility: CLINIC | Age: 64
End: 2019-12-22

## 2019-12-22 NOTE — ANESTHESIA POST-OP PAIN MANAGEMENT
Spoke to patient on the phone today. Patient reports that his catheter was pulled intact yesterday. No complications or concerns.

## 2019-12-22 NOTE — TELEPHONE ENCOUNTER
Pt calls with reports of possible purulent drainage from post-op incision that is collected under waterproof bandage. Pt states it looks like a blister. I spoke with resident on call who stated he would speak with Dr. Amezcua who did pt's sx and follow up after.     1430 - Dr. Turner (ortho resident) called me back, said he spoke with Dr. Amezcua, gave me his phone number to ask pt to text pictures of the concern. I spoke with Ms. Interiano and asked her if she felt comfortable doing so, and she verbalized agreement. I gave her Dr. Turner's phone number.    Reason for Disposition   Dressing soaked with blood or body fluid (e.g., drainage)    Additional Information   Negative: [1] Major abdominal surgical incision AND [2] wound gaping open AND [3] visible internal organs   Negative: Sounds like a life-threatening emergency to the triager   Negative: [1] Bleeding from incision AND [2] won't stop after 10 minutes of direct pressure   Negative: [1] Widespread rash AND [2] bright red, sunburn-like   Negative: Severe pain in the incision   Negative: [1] Suture came out early AND [2] wound gaping AND [3] < 48 hours since sutures placed   Negative: [1] Incision gaping open AND [2] length of opening > 2 inches (5 cm)   Negative: Patient sounds very sick or weak to the triager   Negative: Sounds like a serious complication to the triager   Negative: Fever > 100.5 F (38.1 C)   Negative: [1] Incision looks infected (spreading redness, pain) AND [2] fever > 99.5 F (37.5 C)   Negative: [1] Incision looks infected (spreading redness, pain) AND [2] large red area (> 2 in. or 5 cm)   Negative: [1] Incision looks infected (spreading redness, pain) AND [2] face wound   Negative: [1] Red streak runs from the incision AND [2] longer than 1 inch (2.5 cm)   Negative: [1] Pus or bad-smelling fluid draining from incision AND [2] no fever   Negative: [1] Post-op pain AND [2] not controlled with pain medications    Protocols used:  POST-OP INCISION SYMPTOMS-A-AH

## 2019-12-22 NOTE — ADDENDUM NOTE
Addendum  created 12/22/19 1331 by Jules Rothman MD    Intraprocedure Event edited, Sign clinical note

## 2019-12-23 ENCOUNTER — HOSPITAL ENCOUNTER (OUTPATIENT)
Dept: RADIOLOGY | Facility: HOSPITAL | Age: 64
Discharge: HOME | End: 2019-12-23
Attending: NURSE PRACTITIONER
Payer: COMMERCIAL

## 2019-12-23 ENCOUNTER — OFFICE VISIT (OUTPATIENT)
Dept: ORTHOPEDICS | Facility: CLINIC | Age: 64
End: 2019-12-23
Payer: COMMERCIAL

## 2019-12-23 ENCOUNTER — TELEPHONE (OUTPATIENT)
Dept: ORTHOPEDICS | Facility: CLINIC | Age: 64
End: 2019-12-23

## 2019-12-23 VITALS
BODY MASS INDEX: 29.13 KG/M2 | DIASTOLIC BLOOD PRESSURE: 64 MMHG | TEMPERATURE: 97 F | WEIGHT: 227 LBS | SYSTOLIC BLOOD PRESSURE: 114 MMHG | HEIGHT: 74 IN | HEART RATE: 62 BPM

## 2019-12-23 DIAGNOSIS — Z96.651 STATUS POST TOTAL RIGHT KNEE REPLACEMENT: ICD-10-CM

## 2019-12-23 DIAGNOSIS — Z96.651 STATUS POST TOTAL RIGHT KNEE REPLACEMENT: Primary | ICD-10-CM

## 2019-12-23 PROCEDURE — 73560 X-RAY EXAM OF KNEE 1 OR 2: CPT | Mod: 26,RT,, | Performed by: RADIOLOGY

## 2019-12-23 PROCEDURE — 73560 X-RAY EXAM OF KNEE 1 OR 2: CPT | Mod: TC,RT

## 2019-12-23 PROCEDURE — 99999 PR PBB SHADOW E&M-EST. PATIENT-LVL III: CPT | Mod: PBBFAC,,, | Performed by: NURSE PRACTITIONER

## 2019-12-23 PROCEDURE — 99999 PR PBB SHADOW E&M-EST. PATIENT-LVL III: ICD-10-PCS | Mod: PBBFAC,,, | Performed by: NURSE PRACTITIONER

## 2019-12-23 PROCEDURE — 73560 XR KNEE AP STANDING WITH RIGHT LATERAL: ICD-10-PCS | Mod: 26,RT,, | Performed by: RADIOLOGY

## 2019-12-23 PROCEDURE — 99024 PR POST-OP FOLLOW-UP VISIT: ICD-10-PCS | Mod: S$GLB,,, | Performed by: NURSE PRACTITIONER

## 2019-12-23 PROCEDURE — 99024 POSTOP FOLLOW-UP VISIT: CPT | Mod: S$GLB,,, | Performed by: NURSE PRACTITIONER

## 2019-12-23 RX ORDER — OXYCODONE HYDROCHLORIDE 5 MG/1
TABLET ORAL
Qty: 50 TABLET | Refills: 0 | Status: SHIPPED | OUTPATIENT
Start: 2019-12-23 | End: 2020-08-13

## 2019-12-23 RX ORDER — TRAMADOL HYDROCHLORIDE 50 MG/1
50 TABLET ORAL EVERY 6 HOURS PRN
Qty: 28 TABLET | Refills: 0 | Status: SHIPPED | OUTPATIENT
Start: 2019-12-23 | End: 2020-01-02

## 2019-12-23 NOTE — PROGRESS NOTES
Pt returns today with c/o increased swelling and pain in his leg. He called the on call doctor last night and reported a blister on the edge of his tegaderm dressing. He was told to come in to have the blister unroofed and treated today. Dr. Amezcua came to the room to evaluate and plan as well.  The blister popped when he knocked his leg against the walker during his xray. The area was cleaned and xeroform bandage was placed over the open area and covered with a sterile 4x4. His leg was wrapped with ace wraps to help with the swelling and he will continue to ice and elevate. His eliquis was stopped and he was started on baby aspirin twice a day for dvt prevention. He will f/u next Tuesday or if no improvement, then on this coming Thursday. He was placed in a knee immobilizer and PT is on hold until he is seen back next Tuesday.

## 2019-12-23 NOTE — TELEPHONE ENCOUNTER
----- Message from Ken Amezcua III, MD sent at 12/23/2019  6:36 AM CST -----  Regarding: tegaderm blister  Good morning,     thomas got a call from the on call nurse yesterday, mr ornelas has a blister at the edge of his tegaderm, the telfa looks fine no drainage    I told thomas to tell mr ornelas to leave the blister alone and to come in today or tomorrow to have it addressed    The tegaderm at the blister can be removed, the blister unroofed, and he can do daily xeroform/dry guaze/ace wrap dressing changes    Thank you!

## 2019-12-23 NOTE — TELEPHONE ENCOUNTER
Wife states pt is to see Dr Amezcua Thursday at 8am if he has not improved.  Wife only wanted to book appt for Tuesday Dec 31 midmorning since pt will have to return that day postop to see Dr Amezcua if he doesn't need to come in this Thursday.  Appt booked with Dr Amezcua Dec 31 at 1020a.

## 2019-12-23 NOTE — TELEPHONE ENCOUNTER
"Called and spoke to patient regarding coming in for the blister around his tegaderm. He became very angry and said that his pain is not being managed and he thinks that something is wrong with his knee. I explained that Dr. Amezcua is not here today, but he will be here tomorrow. His wife insists that Dr. Amezcua wants him seen today despite me telling her that his message to me indicates today or tomorrow. The patient became irate and said he's on his way down "end of discussion". I notified Dr. Amezcua so that we can discuss how to proceed once he gets here.  "

## 2019-12-31 ENCOUNTER — OFFICE VISIT (OUTPATIENT)
Dept: ORTHOPEDICS | Facility: CLINIC | Age: 64
End: 2019-12-31
Payer: COMMERCIAL

## 2019-12-31 DIAGNOSIS — T78.40XA ALLERGIC REACTION TO DRUG, INITIAL ENCOUNTER: ICD-10-CM

## 2019-12-31 DIAGNOSIS — Z96.651 STATUS POST TOTAL RIGHT KNEE REPLACEMENT: Primary | ICD-10-CM

## 2019-12-31 PROCEDURE — 99999 PR PBB SHADOW E&M-EST. PATIENT-LVL III: ICD-10-PCS | Mod: PBBFAC,,, | Performed by: ORTHOPAEDIC SURGERY

## 2019-12-31 PROCEDURE — 99024 POSTOP FOLLOW-UP VISIT: CPT | Mod: S$GLB,,, | Performed by: ORTHOPAEDIC SURGERY

## 2019-12-31 PROCEDURE — 99999 PR PBB SHADOW E&M-EST. PATIENT-LVL III: CPT | Mod: PBBFAC,,, | Performed by: ORTHOPAEDIC SURGERY

## 2019-12-31 PROCEDURE — 99024 PR POST-OP FOLLOW-UP VISIT: ICD-10-PCS | Mod: S$GLB,,, | Performed by: ORTHOPAEDIC SURGERY

## 2019-12-31 RX ORDER — HYDROXYZINE HYDROCHLORIDE 25 MG/1
25 TABLET, FILM COATED ORAL EVERY 4 HOURS PRN
Qty: 60 TABLET | Refills: 1 | Status: SHIPPED | OUTPATIENT
Start: 2019-12-31 | End: 2020-08-13

## 2019-12-31 RX ORDER — PREDNISONE 10 MG/1
60 TABLET ORAL DAILY
Qty: 60 TABLET | Refills: 1 | Status: SHIPPED | OUTPATIENT
Start: 2019-12-31 | End: 2020-01-02 | Stop reason: DRUGHIGH

## 2019-12-31 RX ORDER — CETIRIZINE HYDROCHLORIDE 10 MG/1
10 TABLET ORAL DAILY
Qty: 60 TABLET | Refills: 1 | Status: SHIPPED | OUTPATIENT
Start: 2019-12-31 | End: 2020-01-02 | Stop reason: DRUGHIGH

## 2019-12-31 NOTE — PROGRESS NOTES
Subjective:     HPI:   Frank Gay Jr. is a 64 y.o. male who presents  For postop check after recent right total knee replacement     his wife called on Sunday he had a rash starting on his hands and around his buttock and perineum    recommended some Benadryl,  She  sent a picture look like some type of contact dermatitis     he is here today,  The rash has gotten progressively worse is now diffuse   he has stopped the tramadol     he denies any shortness of breath     Objective:   Exam:   his incision is well healed he has got much less swelling in the right lower extremity skin is wrinkling he had a small blister that is sealed up     he has got diffuse rash consistent with hypersensitivity /drug reaction   breathing is nonlabored      Imaging:   none today      Assessment:     Status post total right knee replacement [Z96.651]   systemic allergic reaction     Plan:        I will given an IM dose of 40 milligrams of triamcinolone here in clinic today that the steroids that I have available.  Get him started on 60 milligrams daily of prednisone, itself anterior Z in 10 milligrams daily may increase to 4 times a day, hydroxyzine 25 milligrams up to 6 tablets per day  Which is the protocol used by Dr. Campoverde for a previous patient who had a reaction to Bactrim     he should stop the Celebrex recommend Tylenol 650 milligram several times a day     will work on getting min with allergy clinic ASAP.  We discussed that if symptoms worsen he should go to the emergency room     we can see him back in a week    No orders of the defined types were placed in this encounter.      Past Medical History:   Diagnosis Date    Arthritis     Elevated PSA     Personal history of venous thrombosis and embolism     After trauma in the 70's, he had a DVT with a PE    Pulmonary embolism     Sleep apnea        Past Surgical History:   Procedure Laterality Date    ADENOIDECTOMY      ADENOIDECTOMY      APPENDECTOMY      ELBOW  SURGERY  05/2016    EYE SURGERY      Eyelid    KNEE SURGERY      left knee    periodontal  03/2018    PROSTATE BIOPSY      TONSILLECTOMY      TOTAL KNEE ARTHROPLASTY Right 12/18/2019    Procedure: ARTHROPLASTY, KNEE, TOTAL-DEPUY-SIGMA;  Surgeon: Ken Amezcua III, MD;  Location: [facility];  Service: Orthopedics;  Laterality: Right;       Family History   Problem Relation Age of Onset    Prostate cancer Father     Cancer Father         prostate cancer    Heart disease Mother         Age 93 Y       Social History     Socioeconomic History    Marital status:      Spouse name: Not on file    Number of children: Not on file    Years of education: Not on file    Highest education level: Not on file   Occupational History    Not on file   Social Needs    Financial resource strain: Not on file    Food insecurity:     Worry: Not on file     Inability: Not on file    Transportation needs:     Medical: Not on file     Non-medical: Not on file   Tobacco Use    Smoking status: Never Smoker    Smokeless tobacco: Former User   Substance and Sexual Activity    Alcohol use: No    Drug use: No    Sexual activity: Yes     Partners: Female   Lifestyle    Physical activity:     Days per week: Not on file     Minutes per session: Not on file    Stress: Not on file   Relationships    Social connections:     Talks on phone: Not on file     Gets together: Not on file     Attends Mosque service: Not on file     Active member of club or organization: Not on file     Attends meetings of clubs or organizations: Not on file     Relationship status: Not on file   Other Topics Concern    Not on file   Social History Narrative    Not on file

## 2020-01-02 ENCOUNTER — PATIENT OUTREACH (OUTPATIENT)
Dept: ADMINISTRATIVE | Facility: OTHER | Age: 65
End: 2020-01-02

## 2020-01-02 ENCOUNTER — LAB VISIT (OUTPATIENT)
Dept: LAB | Facility: HOSPITAL | Age: 65
End: 2020-01-02
Attending: INTERNAL MEDICINE
Payer: COMMERCIAL

## 2020-01-02 ENCOUNTER — OFFICE VISIT (OUTPATIENT)
Dept: INTERNAL MEDICINE | Facility: CLINIC | Age: 65
End: 2020-01-02
Payer: COMMERCIAL

## 2020-01-02 ENCOUNTER — OFFICE VISIT (OUTPATIENT)
Dept: ALLERGY | Facility: CLINIC | Age: 65
End: 2020-01-02
Payer: COMMERCIAL

## 2020-01-02 VITALS
HEIGHT: 74 IN | SYSTOLIC BLOOD PRESSURE: 130 MMHG | DIASTOLIC BLOOD PRESSURE: 60 MMHG | WEIGHT: 227 LBS | HEART RATE: 93 BPM | BODY MASS INDEX: 29.13 KG/M2 | OXYGEN SATURATION: 95 %

## 2020-01-02 VITALS
HEIGHT: 74 IN | OXYGEN SATURATION: 95 % | DIASTOLIC BLOOD PRESSURE: 60 MMHG | BODY MASS INDEX: 29.13 KG/M2 | HEART RATE: 93 BPM | WEIGHT: 227 LBS | SYSTOLIC BLOOD PRESSURE: 138 MMHG

## 2020-01-02 DIAGNOSIS — Z12.5 SCREENING FOR PROSTATE CANCER: Primary | ICD-10-CM

## 2020-01-02 DIAGNOSIS — L50.8 ACUTE URTICARIA: ICD-10-CM

## 2020-01-02 DIAGNOSIS — R31.9 HEMATURIA, UNSPECIFIED TYPE: ICD-10-CM

## 2020-01-02 DIAGNOSIS — Z96.651 STATUS POST TOTAL RIGHT KNEE REPLACEMENT: ICD-10-CM

## 2020-01-02 DIAGNOSIS — Z12.5 SCREENING FOR PROSTATE CANCER: ICD-10-CM

## 2020-01-02 DIAGNOSIS — T78.3XXA ANGIOEDEMA, INITIAL ENCOUNTER: Primary | ICD-10-CM

## 2020-01-02 DIAGNOSIS — L29.9 ITCHING: ICD-10-CM

## 2020-01-02 LAB
BILIRUB UR QL STRIP: NEGATIVE
CLARITY UR REFRACT.AUTO: ABNORMAL
COLOR UR AUTO: YELLOW
GLUCOSE UR QL STRIP: NEGATIVE
HGB UR QL STRIP: NEGATIVE
KETONES UR QL STRIP: NEGATIVE
LEUKOCYTE ESTERASE UR QL STRIP: NEGATIVE
NITRITE UR QL STRIP: NEGATIVE
PH UR STRIP: 5 [PH] (ref 5–8)
PROT UR QL STRIP: NEGATIVE
SP GR UR STRIP: 1.02 (ref 1–1.03)
URN SPEC COLLECT METH UR: ABNORMAL

## 2020-01-02 PROCEDURE — 99214 OFFICE O/P EST MOD 30 MIN: CPT | Mod: S$GLB,,, | Performed by: INTERNAL MEDICINE

## 2020-01-02 PROCEDURE — 99999 PR PBB SHADOW E&M-EST. PATIENT-LVL IV: CPT | Mod: PBBFAC,,, | Performed by: INTERNAL MEDICINE

## 2020-01-02 PROCEDURE — 99999 PR PBB SHADOW E&M-EST. PATIENT-LVL IV: ICD-10-PCS | Mod: PBBFAC,,, | Performed by: INTERNAL MEDICINE

## 2020-01-02 PROCEDURE — 36415 COLL VENOUS BLD VENIPUNCTURE: CPT

## 2020-01-02 PROCEDURE — 99999 PR PBB SHADOW E&M-EST. PATIENT-LVL IV: CPT | Mod: PBBFAC,,, | Performed by: STUDENT IN AN ORGANIZED HEALTH CARE EDUCATION/TRAINING PROGRAM

## 2020-01-02 PROCEDURE — 95076 PR INGESTION CHALLENGE TEST; INITIAL 120 MIN: ICD-10-PCS | Mod: S$GLB,,, | Performed by: STUDENT IN AN ORGANIZED HEALTH CARE EDUCATION/TRAINING PROGRAM

## 2020-01-02 PROCEDURE — 95076 INGEST CHALLENGE INI 120 MIN: CPT | Mod: S$GLB,,, | Performed by: STUDENT IN AN ORGANIZED HEALTH CARE EDUCATION/TRAINING PROGRAM

## 2020-01-02 PROCEDURE — 3008F PR BODY MASS INDEX (BMI) DOCUMENTED: ICD-10-PCS | Mod: CPTII,S$GLB,, | Performed by: INTERNAL MEDICINE

## 2020-01-02 PROCEDURE — 81003 URINALYSIS AUTO W/O SCOPE: CPT

## 2020-01-02 PROCEDURE — 84153 ASSAY OF PSA TOTAL: CPT

## 2020-01-02 PROCEDURE — 99999 PR PBB SHADOW E&M-EST. PATIENT-LVL IV: ICD-10-PCS | Mod: PBBFAC,,, | Performed by: STUDENT IN AN ORGANIZED HEALTH CARE EDUCATION/TRAINING PROGRAM

## 2020-01-02 PROCEDURE — 99204 PR OFFICE/OUTPT VISIT, NEW, LEVL IV, 45-59 MIN: ICD-10-PCS | Mod: 25,S$GLB,, | Performed by: STUDENT IN AN ORGANIZED HEALTH CARE EDUCATION/TRAINING PROGRAM

## 2020-01-02 PROCEDURE — 87077 CULTURE AEROBIC IDENTIFY: CPT

## 2020-01-02 PROCEDURE — 99214 PR OFFICE/OUTPT VISIT, EST, LEVL IV, 30-39 MIN: ICD-10-PCS | Mod: S$GLB,,, | Performed by: INTERNAL MEDICINE

## 2020-01-02 PROCEDURE — 3008F BODY MASS INDEX DOCD: CPT | Mod: CPTII,S$GLB,, | Performed by: INTERNAL MEDICINE

## 2020-01-02 PROCEDURE — 87086 URINE CULTURE/COLONY COUNT: CPT

## 2020-01-02 PROCEDURE — 87186 SC STD MICRODIL/AGAR DIL: CPT

## 2020-01-02 PROCEDURE — 99204 OFFICE O/P NEW MOD 45 MIN: CPT | Mod: 25,S$GLB,, | Performed by: STUDENT IN AN ORGANIZED HEALTH CARE EDUCATION/TRAINING PROGRAM

## 2020-01-02 PROCEDURE — 87088 URINE BACTERIA CULTURE: CPT

## 2020-01-02 RX ORDER — PREDNISONE 10 MG/1
TABLET ORAL
Qty: 21 TABLET | Refills: 0 | Status: SHIPPED | OUTPATIENT
Start: 2020-01-02 | End: 2020-01-02

## 2020-01-02 RX ORDER — TRAMADOL HYDROCHLORIDE 50 MG/1
50 TABLET ORAL EVERY 6 HOURS PRN
Qty: 1 TABLET | Refills: 0 | Status: SHIPPED | OUTPATIENT
Start: 2020-01-02 | End: 2020-01-02

## 2020-01-02 RX ORDER — CETIRIZINE HYDROCHLORIDE 10 MG/1
20 TABLET ORAL 2 TIMES DAILY
Qty: 60 TABLET | Refills: 3 | Status: SHIPPED | OUTPATIENT
Start: 2020-01-02 | End: 2020-01-02

## 2020-01-02 RX ORDER — PREDNISONE 10 MG/1
TABLET ORAL
Qty: 21 TABLET | Refills: 0 | Status: SHIPPED | OUTPATIENT
Start: 2020-01-02 | End: 2020-01-30

## 2020-01-02 RX ORDER — CETIRIZINE HYDROCHLORIDE 10 MG/1
20 TABLET ORAL 2 TIMES DAILY
Qty: 60 TABLET | Refills: 3 | Status: SHIPPED | OUTPATIENT
Start: 2020-01-02 | End: 2020-01-30

## 2020-01-02 RX ORDER — TRAMADOL HYDROCHLORIDE 50 MG/1
50 TABLET ORAL EVERY 6 HOURS PRN
Qty: 20 TABLET | Refills: 0 | Status: SHIPPED | OUTPATIENT
Start: 2020-01-02 | End: 2020-01-09 | Stop reason: SDUPTHER

## 2020-01-02 NOTE — PATIENT INSTRUCTIONS
Testing  none      Treatment    For pain  A.  Tramadol (50mg) 1 pill every 6 hours  B.  Extra strength Tylenol    For itching  A. Prednisone burst using 10mg tablets:  5 tablets on day 1  4 tablets on day 2  3 tablets on day 3  2 tablets on day 4  1 tablet on day 5  STOP    B.  Zyrtec = cetirizine (10mg):  2 tablets twice a day  Continue for at least 7 days.  Continue longer if hives still present or if hives return.      Return if hives do not resolve in next 4 weeks.

## 2020-01-02 NOTE — PROGRESS NOTES
Subjective:       Patient ID: Frank Gay Jr. is a 64 y.o. male.    Chief Complaint: Follow-up and Hematuria   URGENT VISIT  This 64-year-old gentleman enjoys good health   has a past medical history of degenerative joint disease, both knees Arthritis, Elevated PSA, 2013 with prostate biopsies negative for cancer, Personal history of venous thrombosis and Pulmonary embolism following a crush injury run over by a tractor with open left femur fracture over 40 years ago, and Sleep apnea.  He presents to the office because of gross hematuria  On December 18th he underwent an elective right total knee replacement for end-stage degenerative joint disease of the right knee.  He was discharged home the following day and on the way home from the hospital his entire right leg became swollen and exquisitely painful.  He went to the emergency room and he had bleeding into the leg and knee.  Eliquis was stopped.  He was continued on Celebrex.  Five days ago he awakened with itching and hives all over, both of his hands were swollen like he was wearing boxing minutes and he has had 3 or 4 episodes where he experiences swelling of the upper lip, lower lip and eyelids.  It is presumed that his urticaria and angioneurotic edema is due to Celebrex which she has stopped and he is improving with the angioneurotic edema and hives.  Oxycodone was stopped he was placed on tramadol and he had some kind a hallucinations.  Last Sunday he urinated and had gross blood but he thought it was just due to the Eliquis which has subsequently been stopped but then today he urinated and it looks like he has got blood in his urine again.  HPI  Review of Systems   Constitutional: Negative for activity change, chills and fever.   HENT: Negative for congestion, dental problem, hearing loss, tinnitus and trouble swallowing.    Eyes: Negative for visual disturbance.   Respiratory: Negative for cough, shortness of breath and wheezing.    Cardiovascular:  Negative for chest pain, palpitations and leg swelling.   Genitourinary: Positive for hematuria. Negative for decreased urine volume, difficulty urinating, discharge, dysuria, flank pain, frequency, genital sores, penile pain, penile swelling, scrotal swelling, testicular pain and urgency.   Musculoskeletal: Negative for back pain and neck pain.   Skin: Negative for rash.        Resolving urticaria and angioneurotic edema.   Neurological: Negative for dizziness, weakness and headaches.   Psychiatric/Behavioral: Negative for dysphoric mood and sleep disturbance. The patient is not nervous/anxious.        Objective:      Physical Exam   Constitutional: He is oriented to person, place, and time. He appears well-developed and well-nourished. No distress.   HENT:   Head: Normocephalic and atraumatic.   Mouth/Throat: Oropharynx is clear and moist. No oropharyngeal exudate.   He is seated in a wheelchair.  He is not uncomfortable.   Eyes: Conjunctivae are normal. No scleral icterus.   Cardiovascular: Normal rate.   Pulmonary/Chest: Effort normal. He has no wheezes.   Abdominal: Soft.   Musculoskeletal:   His surgical site, where he had the right total knee replacement appears to be healing uneventfully.  There is a cool effusion of the right knee joint.  The soft tissue swelling and ecchymoses that he had previously is much decreased.  His right quadriceps muscle is weak and atrophied compared to his left leg.  He is a large man with well-developed upper body musculature and left leg musculature.   Lymphadenopathy:     He has no cervical adenopathy.   Neurological: He is alert and oriented to person, place, and time. He exhibits normal muscle tone. Coordination normal.   Skin:   Resolving ecchymosis, hives and angioneurotic edema.   Nursing note and vitals reviewed.      Assessment:       1. Screening for prostate cancer    2. Hematuria, unspecified type        Plan:   Frank was seen today for follow-up and  hematuria.    Diagnoses and all orders for this visit:    Screening for prostate cancer  -     PSA, Screening; Future    Hematuria, unspecified type, looking at his urine, it looks like hematuria.  He has no symptoms of a kidney stone or urinary tract infection and he has been off Eliquis now for over a week.  We will send a specimen for urinalysis, urine culture and check a PSA.  The last PSA that he had he thinks was at this institution in 2014.  It was explained to the patient that there are false-positive and false negative PSA results.  Nevertheless since he has PSAs for comparison, will check and see what's going on with the PSA now.  To his knowledge he did not have a Calderón catheter on December 18th when he had the total knee replacement.  I recommend staying off tramadol because of the hallucinations he experienced.  He was agitated on OxyContin when he 1st took that after the surgery.  He is not in any terrible pain with the right knee replacement now anyway and the best thing is just to rest, ice and elevate his leg when it is bothering him.  He has not been able to start physical therapy yet but he will be followed up closely in Orthopedic surgery.  -     Urinalysis  -     Urine culture

## 2020-01-02 NOTE — PROGRESS NOTES
Allergy Clinic Note  [facility]    Subjective:      Patient ID: Frank Gay Jr. is a 64 y.o. male.    Chief Complaint: Other (r/o drug allergy, hives and swelling after surgery. )      Referring Provider: Self, Aaareferral    History of Present Illness:  64-year-old male presents complaining of hives and swelling for 5 days.  He is here with his wife.  They are both fair historians.    Patient was in his usual state of health until December 18th when he underwent a total knee replacement.  He was discharged on oxycodone, Eliquis, Celebrex, and docusate.  He developed subcutaneous bleeding, was seen by his surgeon 6 days prior to the onset of hives.  At that time Eliquis was discontinued and Tramadol 50 mg every 6 hr was begun.  Five days ago he awoke with itching and burning welts on his tailbone.  As he also had itching of his palms.  These welts progressed over the next 24 hr and became diffuse by Monday.  Since Monday he also reports 3-4 episodes of swelling, involving his upper lip, lower lip, hands, and eyelids.  He has not had any intraoral swelling there are no other associated symptoms.  He specifically denies any respiratory symptoms     He saw his surgeon on Tuesday (2 days after hive onset) who discontinued the oxycodone and the Celebrex, instead treating with tramadol 50 mg Q 6 hr.  He also prescribed prednisone 60 mg daily, Zyrtec, and Atarax.    Currently the hives are much improved but not entirely gone.  His chief complaint is pain.    Interval history is also significant for a brief period of hallucinations which started Sunday and last through Tuesday.  His wife reports 1 other episode of hallucinations 42 years ago when he was hospitalized for a blood clot.  This was attributed to hypoxia.    Additional History:   Past medical history is significant for remote history of pulmonary embolism.  He is status post adenoidectomy and tonsillectomy. Family history is negative for  allergies and asthma.  Client  reports that he has never smoked. He quit smokeless tobacco use about 2 years ago.  Exposures are notable for 1 cat, 1 dog, and chickens.  No exposure to smoke, mold, or unusual substances.     Patient Active Problem List   Diagnosis    Fatigue    Elevated PSA    Obstructive sleep apnea    Arthritis of ankle, left    Personal history of venous thrombosis and embolism    Status post total right knee replacement    Cubital tunnel syndrome on right    Hyperlipidemia    Acquired cavovarus foot deformity, left    Equinus contracture of left ankle    History of thrombosis    Left ankle injury    Rash    Anemia    Sinus bradycardia    Long term current use of antithrombotics/antiplatelets    Edema    Primary osteoarthritis of right knee     Current Outpatient Medications on File Prior to Visit   Medication Sig Dispense Refill    acetaminophen (TYLENOL) 650 MG TbSR Take 650 mg by mouth 2 (two) times daily as needed.      aspirin (ECOTRIN) 81 MG EC tablet Take 81 mg by mouth once daily.      hydrOXYzine HCl (ATARAX) 25 MG tablet Take 1 tablet (25 mg total) by mouth every 4 (four) hours as needed for Itching. May take up to 6 tabs per day for itching 60 tablet 1    [DISCONTINUED] cetirizine (ZYRTEC) 10 MG tablet Take 1 tablet (10 mg total) by mouth once daily. May take up to 4 times per day as needed for hives and itching for 60 doses 60 tablet 1    [DISCONTINUED] predniSONE (DELTASONE) 10 MG tablet Take 6 tablets (60 mg total) by mouth once daily. 60 tablet 1    apixaban (ELIQUIS) 2.5 mg Tab Take 1 tablet (2.5 mg total) by mouth 2 (two) times daily. 60 tablet 0    celecoxib (CELEBREX) 200 MG capsule Take 1 capsule (200 mg total) by mouth once daily. 30 capsule 0    cyanocobalamin (VITAMIN B-12) 100 MCG tablet Take 100 mcg by mouth once daily.      docusate sodium (COLACE) 100 MG capsule Take 1 capsule (100 mg total) by mouth 2 (two) times daily as needed for  "Constipation. 60 capsule 0    ergocalciferol (ERGOCALCIFEROL) 50,000 unit Cap Take 1 capsule (50,000 Units total) by mouth every 7 days. 4 capsule 0    hydrocortisone 1 % ointment Apply topically as needed (Itching).      ibuprofen (ADVIL,MOTRIN) 200 MG tablet Take 400 mg by mouth every 6 (six) hours as needed for Pain.      lactase (LACTAID) 3,000 unit tablet Take 1 tablet by mouth 3 (three) times daily with meals.      oxyCODONE (ROXICODONE) 5 MG immediate release tablet Take 1-2 tabs by mouth every 4-6 hours as needed for pain 50 tablet 0    [DISCONTINUED] traMADol (ULTRAM) 50 mg tablet Take 1 tablet (50 mg total) by mouth every 6 (six) hours as needed. (Patient not taking: Reported on 12/31/2019) 28 tablet 0     No current facility-administered medications on file prior to visit.          Review of Systems   Constitutional: Negative for chills and fever.   HENT: Negative for ear discharge and nosebleeds.    Eyes: Negative for discharge and redness.   Respiratory: Negative for hemoptysis, sputum production, shortness of breath, wheezing and stridor.    Cardiovascular: Negative for chest pain and palpitations.   Gastrointestinal: Negative for blood in stool, melena and vomiting.   Genitourinary: Positive for hematuria.   Skin: Positive for itching and rash.   Neurological: Negative for seizures and loss of consciousness.   Endo/Heme/Allergies: Negative for environmental allergies. Bruises/bleeds easily.       Objective:   /60   Pulse 93   Ht 6' 2" (1.88 m)   Wt 103 kg (227 lb)   SpO2 95%   BMI 29.15 kg/m²       Physical Exam   Constitutional: He is oriented to person, place, and time and well-developed, well-nourished, and in no distress.   Chronically ill-appearing, sitting in wheelchair, appears to be in moderate pain.  Right leg is extended.   HENT:   Head: Normocephalic and atraumatic.   Nose: Nose normal.   Eyes: Conjunctivae are normal. Right eye exhibits no discharge. Left eye exhibits no " discharge.   Neck: Neck supple.   Cardiovascular: Normal rate, regular rhythm and intact distal pulses.   Pulmonary/Chest: Effort normal. No stridor. No respiratory distress.   Abdominal: He exhibits no distension.   Musculoskeletal: He exhibits no edema or deformity.   Neurological: He is alert and oriented to person, place, and time.   Skin: No rash noted. No erythema.   Isolated welt on left wrist   Psychiatric: Affect and judgment normal.       Data:   Procedure note:  Nonblinded oral challenge with tramadol 50 mg p.o.  Indication:  Rule out immediate urticaria/angioedema  Drug obtained from [facility]  Patient observed for 90 min with no complications.      Assessment:     1. Angioedema, initial encounter    2. Status post total right knee replacement 12/18/2019    3. Itching    4. Acute urticaria        Plan:     Medical decision making:  Patient is presenting with acute urticaria and angioedema.  There is no evidence of anaphylaxis or laryngeal edema.  The urticarial lesions are persisting 2 days after the discontinuation of oxycodone and Celebrex, so these drugs are still on the differential.  Tramadol appears to be a safe drug for him, based both on starting the Tramadol after the hives had begun and based on challenge today.         The top issues are to control his pain and to control his itching.  Since the urticaria and angioedema are acute, I do not recommend diagnostic testing at this time.  Should symptoms last longer than 6-12 weeks, I would like to see him back for additional testing.          For now, recommend treating his pain with Tramadol and treating is itching with high dose antihistamines (Zyrtec 20 mg b.i.d..)  I also recommend tapering off is prednisone as soon as possible.  Please see patient instructions  Frank was seen today for other.    Diagnoses and all orders for this visit:    Angioedema, initial encounter  predniSONE (DELTASONE) 10 MG tablet;   Take 5 tabs on  day 1  Take 4 tabs on day 2  Take 3 tabs on day 3  Take 2 tabs on day 4  Take 1 tab on day 5  Stop      Status post total right knee replacement 12/18/2019  -     traMADol (ULTRAM) 50 mg tablet; Take 1 tablet (50 mg total) by mouth every 6 (six) hours as needed.    Itching  -     cetirizine (ZYRTEC) 10 MG tablet; Take 2 tablets (20 mg total) by mouth 2 (two) times daily. Take for at least seven days and until hives and itching are completely gone.    Acute urticaria     As above    Patient Instructions   Testing  none      Treatment    For pain  A.  Tramadol (50mg) 1 pill every 6 hours  B.  Extra strength Tylenol    For itching  A. Prednisone burst using 10mg tablets:  5 tablets on day 1  4 tablets on day 2  3 tablets on day 3  2 tablets on day 4  1 tablet on day 5  STOP    B.  Zyrtec = cetirizine (10mg):  2 tablets twice a day  Continue for at least 7 days.  Continue longer if hives still present or if hives return.      Return if hives do not resolve in next 4 weeks.      Follow up if symptoms worsen or fail to improve.    Pau Robledo MD           Addendum  -- gross hematuria:  During his oral challenge, he requested a urinal and  produced grossly bloody urine.  Happily, Dr. Marilee Arnold agreed to see him this afternoon.  As mentioned above, Festusoquist was a new medicine at Dec 18 surgery, which was discontinued about 11 days ago.    ab

## 2020-01-03 ENCOUNTER — TELEPHONE (OUTPATIENT)
Dept: INTERNAL MEDICINE | Facility: CLINIC | Age: 65
End: 2020-01-03

## 2020-01-03 DIAGNOSIS — R97.20 ELEVATED PSA: Primary | ICD-10-CM

## 2020-01-03 LAB — COMPLEXED PSA SERPL-MCNC: 5.7 NG/ML (ref 0–4)

## 2020-01-03 NOTE — TELEPHONE ENCOUNTER
I sent him this message regarding his PSA:    Dear Mr. Gay,  Your PSA level of 5.7 is acceptable.  The last PSA you had checked was 3.7 on June 11, 2014. The PSA can be expected to go up yearly by up to 0.5 per year. This is because the prostate gland tends to slowly enlarge.  I recommend you ask your primary care doctor to recheck your PSA in six months, at the beginning July 2020, just to be certain the the rise in your PSA has been a gradual rise as is natural, not a sudden rise.  Assuming your PSA in July of 2020 is around 5.7  then I recommend you check your PSA every year to screen for the early detection of prostate cancer.   The PSA is not a great screening test for the early detection of prostate cancer but it is better than nothing particularly when the PSA is checked yearly and can be compared to the previous years.  Before the PSA screening test existed most prostate cancer diagnoses were late diagnoses.  PSA annual screening is only recommended up to age 70 at the present time. This is because the biology of prostate cancer in men over the age of 70 tends to be different. These prostate tumors most often are slow growing and the risk of dying from prostate cancer in older men is less than in younger men. The concern is that screening with a PSA in men older than age 70 may result in harm from unnecessary biopsy procedures particularly in those men who have not been regularly screened.  Please feel free to contact me with any questions.  Sincerely, Dr. Marilee Arnold

## 2020-01-03 NOTE — TELEPHONE ENCOUNTER
I saw Mr. Gay yesterday because it looked like he had gross hematuria. Thankfully, he did not.  Here is the Patient Portal message I sent to him and his wife:      Dear  And Mrs. Gay,  Your urinalysis is normal. There is no blood present in your urine. I am delighted. I saw your urine yesterday and I must say your urine looked concentrated and your urine had a reddish brown tinge. Perhaps something that you ate or drank could have caused your urine to appear darker than usual or perhaps cellular breakdown products from your total knee replacement and the postoperative bleeding into your right leg are being excreted in your urine.  I recommend you drink lots of water, at least 64 ounces or eight glasses each glass 8 ounces every day. If you are not accustomed to drinking much water, many people find that adding a twist of lemon or lime makes drinking water more refreshing and enjoyable.  It was very nice to meet you and your wife yesterday and I wish you a speedy recovery.  Sincerely, Dr. Marilee Arnold

## 2020-01-04 NOTE — TELEPHONE ENCOUNTER
I have placed an order for this to be done.  Ask him to come in early 2020 to get a repeat TWIN.  I have signed for the following orders AND/OR meds.  Please call the patient and ask the patient to schedule the testing AND/OR inform about any medications that were sent.      Orders Placed This Encounter   Procedures    PSA, Screening     Standing Status:   Standing     Number of Occurrences:   99     Standing Expiration Date:   12/30/2039

## 2020-01-05 LAB
BACTERIA UR CULT: ABNORMAL
BACTERIA UR CULT: ABNORMAL

## 2020-01-08 ENCOUNTER — PATIENT MESSAGE (OUTPATIENT)
Dept: INTERNAL MEDICINE | Facility: CLINIC | Age: 65
End: 2020-01-08

## 2020-01-08 ENCOUNTER — TELEPHONE (OUTPATIENT)
Dept: INTERNAL MEDICINE | Facility: CLINIC | Age: 65
End: 2020-01-08

## 2020-01-09 ENCOUNTER — OFFICE VISIT (OUTPATIENT)
Dept: ORTHOPEDICS | Facility: CLINIC | Age: 65
End: 2020-01-09
Payer: COMMERCIAL

## 2020-01-09 VITALS
HEART RATE: 58 BPM | DIASTOLIC BLOOD PRESSURE: 60 MMHG | HEIGHT: 74 IN | WEIGHT: 227 LBS | SYSTOLIC BLOOD PRESSURE: 135 MMHG | BODY MASS INDEX: 29.13 KG/M2

## 2020-01-09 DIAGNOSIS — Z96.651 STATUS POST TOTAL RIGHT KNEE REPLACEMENT: ICD-10-CM

## 2020-01-09 PROCEDURE — 99999 PR PBB SHADOW E&M-EST. PATIENT-LVL III: CPT | Mod: PBBFAC,,, | Performed by: NURSE PRACTITIONER

## 2020-01-09 PROCEDURE — 99024 POSTOP FOLLOW-UP VISIT: CPT | Mod: S$GLB,,, | Performed by: NURSE PRACTITIONER

## 2020-01-09 PROCEDURE — 99999 PR PBB SHADOW E&M-EST. PATIENT-LVL III: ICD-10-PCS | Mod: PBBFAC,,, | Performed by: NURSE PRACTITIONER

## 2020-01-09 PROCEDURE — 99024 PR POST-OP FOLLOW-UP VISIT: ICD-10-PCS | Mod: S$GLB,,, | Performed by: NURSE PRACTITIONER

## 2020-01-09 RX ORDER — TRAMADOL HYDROCHLORIDE 50 MG/1
50 TABLET ORAL EVERY 6 HOURS PRN
Qty: 28 TABLET | Refills: 0 | Status: SHIPPED | OUTPATIENT
Start: 2020-01-09 | End: 2020-01-19

## 2020-01-09 NOTE — TELEPHONE ENCOUNTER
Dear Shereen and Dr. Amezcua,  I just spoke to the patient on the phone.  He assures me he is feeling better in every way and that his urine looks much more normal.  He might not need to be treated.  I recommend rechecking a urinalysis and urine culture when he comes to the office tomorrow.  These 2 organisms are both pathogens, however they could represent bacteruria without urinary tract infection and  may have cleared spontaneously.  Sincerely, Dr. Marilee Arnold         Contains abnormal data Urine culture   Order: [id number]   Status:  Final result   Visible to patient:  Yes (Patient Portal) Dx:  Hematuria, unspecified type   Specimen Information: Urine, Clean Catch        Component 6d ago   Urine Culture, Routine Abnormal    KLEBSIELLA PNEUMONIAE   10,000 - 49,999 cfu/ml     Urine Culture, Routine Abnormal    MORGANELLA MORGANII   10,000 - 49,999 cfu/ml     Resulting Agency [facility]   Susceptibility      Klebsiella pneumoniae Morganella morganii     CULTURE, URINE CULTURE, URINE     Amox/K Clav'ate <=8/4 mcg/mL Sensitive       Amp/Sulbactam <=8/4 mcg/mL Sensitive >16/8 mcg/mL Resistant     Cefazolin <=2 mcg/mL Sensitive       Cefepime <=2 mcg/mL Sensitive <=2 mcg/mL Sensitive     Ceftriaxone <=1 mcg/mL Sensitive <=1 mcg/mL Sensitive     Ciprofloxacin <=1 mcg/mL Sensitive <=1 mcg/mL Sensitive     Ertapenem <=0.5 mcg/mL Sensitive <=0.5 mcg/mL Sensitive     Gentamicin <=4 mcg/mL Sensitive <=4 mcg/mL Sensitive     Levofloxacin <=2 mcg/mL Sensitive <=2 mcg/mL Sensitive     Meropenem <=1 mcg/mL Sensitive <=1 mcg/mL Sensitive     Nitrofurantoin 64 mcg/mL Intermediate       Piperacillin/Tazo <=16 mcg/mL Sensitive <=16 mcg/mL Sensitive     Tetracycline <=4 mcg/mL Sensitive       Tobramycin <=4 mcg/mL Sensitive <=4 mcg/mL Sensitive     Trimeth/Sulfa <=2/38 mcg/mL Sensitive <=2/38 mcg/mL Sensitive                   Specimen Collected: 01/02/20 12:18 Last Resulted: 01/05/20 02:19         Lab Flowsheet       Order Details        View Encounter       Lab and Collection Details       Routing       Result History               Contains abnormal data Urinalysis   Order: [id number]   Status:  Final result   Visible to patient:  Yes (Patient Portal) Dx:  Hematuria, unspecified type    Ref Range & Units 6d ago  (1/2/20) 7yr ago  (1/2/13) 7yr ago  (11/23/12)   Specimen UA  Urine, Clean Catch      Color, UA Yellow, Straw, Julienne Yellow      Appearance, UA Clear HazyAbnormal       pH, UA 5.0 - 8.0 5.0      Specific Gravity, UA 1.005 - 1.030 1.025      Protein, UA Negative Negative      Comment: Recommend a 24 hour urine protein or a urine   protein/creatinine ratio if globulin induced proteinuria is   clinically suspected.    Glucose, UA Negative Negative      Ketones, UA Negative Negative  neg R neg R   Bilirubin (UA) Negative Negative      Occult Blood UA Negative Negative      Nitrite, UA Negative Negative      Leukocytes, UA Negative Negative      Color, UA   yellow CM yellow    Blood, UA   trace  neg    Spec Grav UA   1.015  1.005    pH, UA   7  8.0    WBC, UA   neg  neg    Nitrite, UA   neg  neg    Protein   neg  neg    Glucose, UA   neg  neg    Bilirubin   neg  neg    Urobilinogen, UA   neg  neg    Resulting Agency  [facility]           Specimen Collected: 01/02/20 12:18 Last Resulted: 01/02/20 23:12         Lab Flowsheet       Order Details       View Encounter       Lab and Collection Details       Routing       Result History         CM=Additional comments  R=Reference range differs from displayed range           Patient Result Comments     Viewed by Laisha Carcamo on 1/3/2020 11:21 AM   Written by Marilee Arnold MD on 1/3/2020  8:35 AM   Dear  And Mrs. Gay,   Your urinalysis is normal. There is no blood present in your urine. I am delighted. I saw your urine yesterday and I must say your urine looked concentrated and your urine had a reddish brown tinge. Perhaps something that you ate or drank could have caused your urine to  appear darker than usual or perhaps cellular breakdown products from your total knee replacement and the postoperative bleeding into your right leg are being excreted in your urine.   I recommend you drink lots of water, at least 64 ounces or eight glasses each glass 8 ounces every day. If you are not accustomed to drinking much water, many people find that adding a twist of lemon or lime makes drinking water more refreshing and enjoyable.   It was very nice to meet you and your wife yesterday and I wish you a speedy recovery.   Sincerely, Dr. Marilee Arnold    Status of Other Orders     Completed     PSA, Screening Abnormal 01/03/20          Future Appointments     Tomorrow [facility] - [facility]opedics, [facility]   In 3 weeks MD [facility] - Orthopedics, [facility]   Recent Visits      Provider Department Primary Dx   01/02/2020  [facility] Screening for prostate cancer   01/02/2020 Basilio Gentile MA [facility]    01/02/2020 MD [facility] - Allergy/ Immunology Angioedema, initial encounter   12/31/2019 MD [facility] - Orthopedics Status post total right knee replacement 12/18/2019 12/23/2019 MD [facility] - Orthopedics    Surgical and Procedural Summary     Past Procedures (12/9/2019 to Today)      Date Time Procedures Providers Location Status     12/18/2019 0700 Arthroplasty, Knee, [facility] Complete  Open case

## 2020-01-10 ENCOUNTER — TELEPHONE (OUTPATIENT)
Dept: ORTHOPEDICS | Facility: CLINIC | Age: 65
End: 2020-01-10

## 2020-01-10 DIAGNOSIS — Z96.651 S/P TOTAL KNEE REPLACEMENT USING CEMENT, RIGHT: Primary | ICD-10-CM

## 2020-01-10 NOTE — TELEPHONE ENCOUNTER
----- Message from Melanie Hein sent at 1/10/2020  9:19 AM CST -----  Contact: [facility]  Need new orders for physical therapy send to office Pt has an appointment today for 1:30p    Contact info  [phone] Office  [phone] Fax

## 2020-01-10 NOTE — TELEPHONE ENCOUNTER
Returned call to [facility] and let Sary know that I refaxed the orders that apparently didn't go through on Wednesday.

## 2020-01-13 NOTE — PROGRESS NOTES
"Frank Gay Jr. presents for initial post-operative visit following a right total knee arthroplasty performed by Dr. Amezcua on 12/18/2019. Tolerating pain medication well.  He reports that his pain is much better since stopping the eliquis and completing the medrol dose pack. The swelling, rash, and dark urine have all resolved. He is doing well and walked from the garage to this appt on his walker.    Exam:   Blood pressure 135/60, pulse (!) 58, height 6' 2" (1.88 m), weight 103 kg (227 lb).   Ambulating well with assistive device.  Incision is clean and dry without drainage or erythema. ROM:0-90    Initial post-operative radiographs reviewed today revealing a well fixed and aligned prosthesis.    A/P:  2 weeks s/p right total knee replacement   - Dr. Amezcua came to the room to evaluate as well  - The patient was advised to keep the incision clean and dry for the next 24 hours after which he may wash the area with antibacterial soap in the shower. Will not submerge until the incision is completely healed.   - Outpatient PT: [facility] as rqeu[facility]  - Continue aspirin for 1 month from surgery.  - Pain medication: refill not needed at this time  - Follow up in 4 weeks with Dr. Amezcua or sooner as needed. Pt will call clinic with problems/concerns.     "

## 2020-01-16 ENCOUNTER — PATIENT MESSAGE (OUTPATIENT)
Dept: ORTHOPEDICS | Facility: CLINIC | Age: 65
End: 2020-01-16

## 2020-01-30 ENCOUNTER — OFFICE VISIT (OUTPATIENT)
Dept: ORTHOPEDICS | Facility: CLINIC | Age: 65
End: 2020-01-30
Payer: COMMERCIAL

## 2020-01-30 ENCOUNTER — HOSPITAL ENCOUNTER (OUTPATIENT)
Dept: RADIOLOGY | Facility: HOSPITAL | Age: 65
Discharge: HOME | End: 2020-01-30
Attending: ORTHOPAEDIC SURGERY
Payer: COMMERCIAL

## 2020-01-30 VITALS — BODY MASS INDEX: 29.13 KG/M2 | HEIGHT: 74 IN | WEIGHT: 227 LBS

## 2020-01-30 DIAGNOSIS — Z96.651 S/P TOTAL KNEE REPLACEMENT USING CEMENT, RIGHT: ICD-10-CM

## 2020-01-30 DIAGNOSIS — Z96.651 S/P TOTAL KNEE REPLACEMENT USING CEMENT, RIGHT: Primary | ICD-10-CM

## 2020-01-30 PROCEDURE — 73562 X-RAY EXAM OF KNEE 3: CPT | Mod: 26,RT,, | Performed by: RADIOLOGY

## 2020-01-30 PROCEDURE — 99999 PR PBB SHADOW E&M-EST. PATIENT-LVL III: ICD-10-PCS | Mod: PBBFAC,,, | Performed by: ORTHOPAEDIC SURGERY

## 2020-01-30 PROCEDURE — 99024 PR POST-OP FOLLOW-UP VISIT: ICD-10-PCS | Mod: S$GLB,,, | Performed by: ORTHOPAEDIC SURGERY

## 2020-01-30 PROCEDURE — 99999 PR PBB SHADOW E&M-EST. PATIENT-LVL III: CPT | Mod: PBBFAC,,, | Performed by: ORTHOPAEDIC SURGERY

## 2020-01-30 PROCEDURE — 73562 X-RAY EXAM OF KNEE 3: CPT | Mod: TC,RT

## 2020-01-30 PROCEDURE — 73562 XR KNEE 3 VIEW RIGHT: ICD-10-PCS | Mod: 26,RT,, | Performed by: RADIOLOGY

## 2020-01-30 PROCEDURE — 99024 POSTOP FOLLOW-UP VISIT: CPT | Mod: S$GLB,,, | Performed by: ORTHOPAEDIC SURGERY

## 2020-01-30 RX ORDER — GABAPENTIN 300 MG/1
300 CAPSULE ORAL 3 TIMES DAILY
Qty: 90 CAPSULE | Refills: 2 | Status: SHIPPED | OUTPATIENT
Start: 2020-01-30 | End: 2020-08-13

## 2020-01-30 RX ORDER — HYDROMORPHONE HYDROCHLORIDE 2 MG/1
2 TABLET ORAL EVERY 4 HOURS PRN
Qty: 50 TABLET | Refills: 0 | Status: SHIPPED | OUTPATIENT
Start: 2020-01-30 | End: 2020-08-13

## 2020-01-30 NOTE — PROGRESS NOTES
Subjective:     HPI:   Frank Gay Jr. is a 64 y.o. male who presents for 6 week follow-up right total knee replacement December 18, 2019    He has had a difficult postoperative course.  He had a allergic reaction presumably to Celebrex.  That has now calmed down.     Today he says he is having significant pain in the knee which sounds neuropathic he complains of hypersensitivity around the knee.  He feels like he over did it with therapy a few days ago and that made things worse.    He does not tolerate oxycodone, he does not tolerate tramadol, he was allergic to Celebrex, he says that Tylenol is not helping.    Who is using a cane    There is a significant overlay of marital issues going on.  His wife is crying today and says that this is is close to a divorce is she is ever been     Objective:   Exam:  Antalgic gait no significant limp is using a cane.  0-118 degree knee range of motion 6° valgus alignment he has moderate effusion but no erythema his incisions well-healed knee stable anterior-posterior varus and valgus stresses no extensor lag no flexion contracture.  He is diffusely hypersensitive to light touch across the knee.    Preop range of motion was 5-125      Imaging:  Indication:  Exam status post right total knee arthroplasty  Exam Ordered: Radiographs of the right knee include a standing anteroposterior view, a lateral view in full flexion, and a sunrise view  Details of Examination: Todays exam show a well fixed, well positioned total knee arthroplasty with no evidence of wear, osteolysis, or loosening.  Impression:  Status post right total knee arthroplasty, implant in good position with no abnormality        Assessment:     S/P total knee replacement using cement, right [Z96.651]    Presumed Celebrex allergy, resolved  Now neuropathic pain     Plan:       We discussed that I think that the majority of this is neuropathic pain in nature.  His incisions well-healed he has good range of motion  the knee and good stability.    He does have a small effusion so we will get an ESR and a CRP today to rule out infection.    He will start some Neurontin, we will do desensitization exercises, he will stop his physical therapy, I recommend using Aleve, we will give him a short prescription of Dilaudid 2 milligram tablets number 50 and explained that we will not refill this    If symptoms get worse I instructed him to call us and we will send him to the Pain Clinic for evaluation for geniculate nerve blocks.    Otherwise we will see him back in 2 weeks for recheck no x-rays    Orders Placed This Encounter   Procedures    C-reactive protein     Standing Status:   Future     Standing Expiration Date:   3/30/2021    Sedimentation rate     Standing Status:   Future     Standing Expiration Date:   3/30/2021       Past Medical History:   Diagnosis Date    Arthritis     Elevated PSA     Personal history of venous thrombosis and embolism     After trauma in the 70's, he had a DVT with a PE    Pulmonary embolism     Sleep apnea        Past Surgical History:   Procedure Laterality Date    ADENOIDECTOMY      ADENOIDECTOMY      APPENDECTOMY      ELBOW SURGERY  05/2016    EYE SURGERY      Eyelid    KNEE SURGERY      left knee    periodontal  03/2018    PROSTATE BIOPSY      TONSILLECTOMY      TOTAL KNEE ARTHROPLASTY Right 12/18/2019    Procedure: ARTHROPLASTY, KNEE, TOTAL-DEPUY-SIGMA;  Surgeon: Ken Amezcua III, MD;  Location: [facility] OR;  Service: Orthopedics;  Laterality: Right;       Family History   Problem Relation Age of Onset    Prostate cancer Father     Cancer Father         prostate cancer    Heart disease Mother         Age 93 Y       Social History     Socioeconomic History    Marital status:      Spouse name: Not on file    Number of children: Not on file    Years of education: Not on file    Highest education level: Not on file   Occupational History    Not on file   Social Needs     Financial resource strain: Not on file    Food insecurity:     Worry: Not on file     Inability: Not on file    Transportation needs:     Medical: Not on file     Non-medical: Not on file   Tobacco Use    Smoking status: Never Smoker    Smokeless tobacco: Former User   Substance and Sexual Activity    Alcohol use: No    Drug use: No    Sexual activity: Yes     Partners: Female   Lifestyle    Physical activity:     Days per week: Not on file     Minutes per session: Not on file    Stress: Not on file   Relationships    Social connections:     Talks on phone: Not on file     Gets together: Not on file     Attends Sabianism service: Not on file     Active member of club or organization: Not on file     Attends meetings of clubs or organizations: Not on file     Relationship status: Not on file   Other Topics Concern    Not on file   Social History Narrative    Not on file

## 2020-01-30 NOTE — PROGRESS NOTES
Subjective:     HPI:   Frank Gay Jr. is a 64 y.o. male who presents ***     Objective:   Exam:  ***      Imaging:  ***      Assessment:     S/P total knee replacement using cement, right [Z96.651]  ***     Plan:       ***    Orders Placed This Encounter   Procedures    C-reactive protein     Standing Status:   Future     Standing Expiration Date:   3/30/2021    Sedimentation rate     Standing Status:   Future     Standing Expiration Date:   3/30/2021       Past Medical History:   Diagnosis Date    Arthritis     Elevated PSA     Personal history of venous thrombosis and embolism     After trauma in the 70's, he had a DVT with a PE    Pulmonary embolism     Sleep apnea        Past Surgical History:   Procedure Laterality Date    ADENOIDECTOMY      ADENOIDECTOMY      APPENDECTOMY      ELBOW SURGERY  05/2016    EYE SURGERY      Eyelid    KNEE SURGERY      left knee    periodontal  03/2018    PROSTATE BIOPSY      TONSILLECTOMY      TOTAL KNEE ARTHROPLASTY Right 12/18/2019    Procedure: ARTHROPLASTY, KNEE, TOTAL-DEPUY-SIGMA;  Surgeon: Ken Amezcua III, MD;  Location: [facility];  Service: Orthopedics;  Laterality: Right;       Family History   Problem Relation Age of Onset    Prostate cancer Father     Cancer Father         prostate cancer    Heart disease Mother         Age 93 Y       Social History     Socioeconomic History    Marital status:      Spouse name: Not on file    Number of children: Not on file    Years of education: Not on file    Highest education level: Not on file   Occupational History    Not on file   Social Needs    Financial resource strain: Not on file    Food insecurity:     Worry: Not on file     Inability: Not on file    Transportation needs:     Medical: Not on file     Non-medical: Not on file   Tobacco Use    Smoking status: Never Smoker    Smokeless tobacco: Former User   Substance and Sexual Activity    Alcohol use: No    Drug use: No    Sexual  activity: Yes     Partners: Female   Lifestyle    Physical activity:     Days per week: Not on file     Minutes per session: Not on file    Stress: Not on file   Relationships    Social connections:     Talks on phone: Not on file     Gets together: Not on file     Attends Evangelical service: Not on file     Active member of club or organization: Not on file     Attends meetings of clubs or organizations: Not on file     Relationship status: Not on file   Other Topics Concern    Not on file   Social History Narrative    Not on file

## 2020-02-13 ENCOUNTER — OFFICE VISIT (OUTPATIENT)
Dept: ORTHOPEDICS | Facility: CLINIC | Age: 65
End: 2020-02-13
Payer: COMMERCIAL

## 2020-02-13 VITALS — BODY MASS INDEX: 29.88 KG/M2 | WEIGHT: 232.81 LBS | HEIGHT: 74 IN

## 2020-02-13 DIAGNOSIS — Z96.651 S/P TOTAL KNEE REPLACEMENT USING CEMENT, RIGHT: Primary | ICD-10-CM

## 2020-02-13 PROCEDURE — 99999 PR PBB SHADOW E&M-EST. PATIENT-LVL III: ICD-10-PCS | Mod: PBBFAC,,, | Performed by: ORTHOPAEDIC SURGERY

## 2020-02-13 PROCEDURE — 99024 POSTOP FOLLOW-UP VISIT: CPT | Mod: S$GLB,,, | Performed by: ORTHOPAEDIC SURGERY

## 2020-02-13 PROCEDURE — 99024 PR POST-OP FOLLOW-UP VISIT: ICD-10-PCS | Mod: S$GLB,,, | Performed by: ORTHOPAEDIC SURGERY

## 2020-02-13 PROCEDURE — 99999 PR PBB SHADOW E&M-EST. PATIENT-LVL III: CPT | Mod: PBBFAC,,, | Performed by: ORTHOPAEDIC SURGERY

## 2020-02-13 NOTE — PROGRESS NOTES
Subjective:     HPI:   Frank Gay Jr. is a 64 y.o. male who presents for repeat follow-up right total knee replacement he is 8 weeks out December 18, 2019    We someone February 3rd he had 0 to at 118 degree knee range of motion his ESR was 24 his CRP was 6.4.    He is on Neurontin were doing desensitization exercises we backed down on his therapy doing Aleve gave him some Dilaudid prescribed a compounding topical cream that is helping the Dilaudid is giving minimal relief.  The Neurontin is helping as well.  All this points to neuropathic pain.    Overall he says he has got 95% of his feeling back he does feel better he has got decreased sensitivity his goal is to improve his motion.     Objective:   Exam:  Decreased sensitivity to light touch      Imaging:  None today      Assessment:     S/P total knee replacement using cement, right [Z96.651]  Postoperative neuropathic pain  Postoperative allergic reaction likely to Celebrex     Plan:       He has had a rough postoperative course.    Offered him an evaluation by the pain clinic for geniculate blocks/ablation.  He does have [employer] which would make that difficult.  He wants to continue to give it time.    We can see him back in a month no x-rays   continue Neurontin no more Dilaudid refills    No orders of the defined types were placed in this encounter.      Past Medical History:   Diagnosis Date    Arthritis     Elevated PSA     Personal history of venous thrombosis and embolism     After trauma in the 70's, he had a DVT with a PE    Pulmonary embolism     Sleep apnea        Past Surgical History:   Procedure Laterality Date    ADENOIDECTOMY      ADENOIDECTOMY      APPENDECTOMY      ELBOW SURGERY  05/2016    EYE SURGERY      Eyelid    KNEE SURGERY      left knee    periodontal  03/2018    PROSTATE BIOPSY      TONSILLECTOMY      TOTAL KNEE ARTHROPLASTY Right 12/18/2019    Procedure: ARTHROPLASTY, KNEE, TOTAL-DEPUY-SIGMA;   Surgeon: Ken Amezcua III, MD;  Location: [facility];  Service: Orthopedics;  Laterality: Right;       Family History   Problem Relation Age of Onset    Prostate cancer Father     Cancer Father         prostate cancer    Heart disease Mother         Age 93 Y       Social History     Socioeconomic History    Marital status:      Spouse name: Not on file    Number of children: Not on file    Years of education: Not on file    Highest education level: Not on file   Occupational History    Not on file   Social Needs    Financial resource strain: Not on file    Food insecurity:     Worry: Not on file     Inability: Not on file    Transportation needs:     Medical: Not on file     Non-medical: Not on file   Tobacco Use    Smoking status: Never Smoker    Smokeless tobacco: Former User   Substance and Sexual Activity    Alcohol use: No    Drug use: No    Sexual activity: Yes     Partners: Female   Lifestyle    Physical activity:     Days per week: Not on file     Minutes per session: Not on file    Stress: Not on file   Relationships    Social connections:     Talks on phone: Not on file     Gets together: Not on file     Attends Voodoo service: Not on file     Active member of club or organization: Not on file     Attends meetings of clubs or organizations: Not on file     Relationship status: Not on file   Other Topics Concern    Not on file   Social History Narrative    Not on file

## 2020-03-09 DIAGNOSIS — Z96.651 S/P TOTAL KNEE REPLACEMENT USING CEMENT, RIGHT: Primary | ICD-10-CM

## 2020-03-12 ENCOUNTER — OFFICE VISIT (OUTPATIENT)
Dept: ORTHOPEDICS | Facility: CLINIC | Age: 65
End: 2020-03-12
Payer: COMMERCIAL

## 2020-03-12 ENCOUNTER — HOSPITAL ENCOUNTER (OUTPATIENT)
Dept: RADIOLOGY | Facility: HOSPITAL | Age: 65
Discharge: HOME | End: 2020-03-12
Attending: ORTHOPAEDIC SURGERY
Payer: COMMERCIAL

## 2020-03-12 VITALS — HEIGHT: 74 IN | WEIGHT: 229.63 LBS | BODY MASS INDEX: 29.47 KG/M2

## 2020-03-12 DIAGNOSIS — Z96.651 STATUS POST TOTAL RIGHT KNEE REPLACEMENT: Primary | ICD-10-CM

## 2020-03-12 DIAGNOSIS — Z96.651 S/P TOTAL KNEE REPLACEMENT USING CEMENT, RIGHT: ICD-10-CM

## 2020-03-12 PROCEDURE — 73560 X-RAY EXAM OF KNEE 1 OR 2: CPT | Mod: 26,RT,, | Performed by: RADIOLOGY

## 2020-03-12 PROCEDURE — 73560 XR KNEE 1 OR 2 VIEW RIGHT: ICD-10-PCS | Mod: 26,RT,, | Performed by: RADIOLOGY

## 2020-03-12 PROCEDURE — 99999 PR PBB SHADOW E&M-EST. PATIENT-LVL III: CPT | Mod: PBBFAC,,, | Performed by: ORTHOPAEDIC SURGERY

## 2020-03-12 PROCEDURE — 99024 PR POST-OP FOLLOW-UP VISIT: ICD-10-PCS | Mod: S$GLB,,, | Performed by: ORTHOPAEDIC SURGERY

## 2020-03-12 PROCEDURE — 73560 X-RAY EXAM OF KNEE 1 OR 2: CPT | Mod: TC,RT

## 2020-03-12 PROCEDURE — 99024 POSTOP FOLLOW-UP VISIT: CPT | Mod: S$GLB,,, | Performed by: ORTHOPAEDIC SURGERY

## 2020-03-12 PROCEDURE — 99999 PR PBB SHADOW E&M-EST. PATIENT-LVL III: ICD-10-PCS | Mod: PBBFAC,,, | Performed by: ORTHOPAEDIC SURGERY

## 2020-03-12 RX ORDER — AMOXICILLIN 500 MG/1
2000 TABLET, FILM COATED ORAL ONCE
Qty: 4 TABLET | Refills: 0 | Status: SHIPPED | OUTPATIENT
Start: 2020-03-12 | End: 2020-03-12

## 2020-03-12 NOTE — PROGRESS NOTES
Subjective:     HPI:   Frank Gay Jr. is a 64 y.o. male who presents for 3 month follow-up right total knee replacement December 18, 2019 complicated by Celebrex allergic reaction possible Eliquis reaction but I do not think this was real, neuropathic pain    Overall he says he is much better and feels like he has turned the corner.  He is just taking Aleve twice a day he is minimally using the compounding cream he is doing home exercise program no assistive devices he says he gets a little sore on a long drive     Overall he says he is 95% better he says he does get tired when he works for a few hours     Objective:   Body mass index is 29.48 kg/m².  Exam:  No limp nonantalgic gait incisions well-healed 0-125 degree knee range of motion 5° valgus alignment knee stable anterior-posterior varus and valgus stresses no flexion contracture or extensor lag be minimal if any hypersensitivity much improved      Imaging:  Indication:  Exam status post right total knee arthroplasty  Exam Ordered: Radiographs of the right knee include a standing anteroposterior view, a lateral view in full flexion,   Details of Examination: Todays exam show a well fixed, well positioned total knee arthroplasty with no evidence of wear, osteolysis, or loosening.  Impression:  Status post right total knee arthroplasty, implant in good position with no abnormality        Assessment:     Status post total right knee replacement [Z96.651]       Plan:       Patient is doing very well with their total knee arthroplasty.  They will continue with their routine care of the knee replacement and see me back for their follow-up at the routine interval.  If there are problems in the interim they will see me back sooner.    He has had a rough postop course but he looks a ton better.  He asked about antibiotics for dental prophylaxis I will send him in an initial prescription for amoxicillin.    None month follow-up for 1 year x-rays    No orders of the  defined types were placed in this encounter.      Medications Ordered This Encounter   Medications    amoxicillin (AMOXIL) 500 MG Tab     Sig: Take 4 tablets (2,000 mg total) by mouth once. Take 1 hour prior to procedure for 1 dose     Dispense:  4 tablet     Refill:  0        Past Medical History:   Diagnosis Date    Arthritis     Elevated PSA     Personal history of venous thrombosis and embolism     After trauma in the 70's, he had a DVT with a PE    Pulmonary embolism     Sleep apnea        Past Surgical History:   Procedure Laterality Date    ADENOIDECTOMY      ADENOIDECTOMY      APPENDECTOMY      ELBOW SURGERY  05/2016    EYE SURGERY      Eyelid    KNEE SURGERY      left knee    periodontal  03/2018    PROSTATE BIOPSY      TONSILLECTOMY      TOTAL KNEE ARTHROPLASTY Right 12/18/2019    Procedure: ARTHROPLASTY, KNEE, TOTAL-DEPUY-SIGMA;  Surgeon: Ken Amezcua III, MD;  Location: [facility];  Service: Orthopedics;  Laterality: Right;       Family History   Problem Relation Age of Onset    Prostate cancer Father     Cancer Father         prostate cancer    Heart disease Mother         Age 93 Y       Social History     Socioeconomic History    Marital status:      Spouse name: Not on file    Number of children: Not on file    Years of education: Not on file    Highest education level: Not on file   Occupational History    Not on file   Social Needs    Financial resource strain: Not on file    Food insecurity:     Worry: Not on file     Inability: Not on file    Transportation needs:     Medical: Not on file     Non-medical: Not on file   Tobacco Use    Smoking status: Never Smoker    Smokeless tobacco: Former User   Substance and Sexual Activity    Alcohol use: No    Drug use: No    Sexual activity: Yes     Partners: Female   Lifestyle    Physical activity:     Days per week: Not on file     Minutes per session: Not on file    Stress: Not on file   Relationships    Social  connections:     Talks on phone: Not on file     Gets together: Not on file     Attends [employer] service: Not on file     Active member of club or organization: Not on file     Attends meetings of clubs or organizations: Not on file     Relationship status: Not on file   Other Topics Concern    Not on file   Social History Narrative    Not on file

## 2020-08-13 ENCOUNTER — OFFICE VISIT (OUTPATIENT)
Dept: FAMILY MEDICINE | Facility: CLINIC | Age: 65
End: 2020-08-13
Payer: COMMERCIAL

## 2020-08-13 VITALS
HEIGHT: 73 IN | DIASTOLIC BLOOD PRESSURE: 66 MMHG | HEART RATE: 56 BPM | BODY MASS INDEX: 29.42 KG/M2 | WEIGHT: 222 LBS | SYSTOLIC BLOOD PRESSURE: 109 MMHG

## 2020-08-13 DIAGNOSIS — Z02.89 PHYSICAL EXAMINATION OF EMPLOYEE: Primary | ICD-10-CM

## 2020-08-13 PROCEDURE — 3008F BODY MASS INDEX DOCD: CPT | Mod: CPTII,S$GLB,, | Performed by: FAMILY MEDICINE

## 2020-08-13 PROCEDURE — 99499 UNLISTED E&M SERVICE: CPT | Mod: S$GLB,,, | Performed by: FAMILY MEDICINE

## 2020-08-13 PROCEDURE — 99999 PR PBB SHADOW E&M-EST. PATIENT-LVL II: ICD-10-PCS | Mod: PBBFAC,,, | Performed by: FAMILY MEDICINE

## 2020-08-13 PROCEDURE — 99499 NO LOS: ICD-10-PCS | Mod: S$GLB,,, | Performed by: FAMILY MEDICINE

## 2020-08-13 PROCEDURE — 3008F PR BODY MASS INDEX (BMI) DOCUMENTED: ICD-10-PCS | Mod: CPTII,S$GLB,, | Performed by: FAMILY MEDICINE

## 2020-08-13 PROCEDURE — 99999 PR PBB SHADOW E&M-EST. PATIENT-LVL II: CPT | Mod: PBBFAC,,, | Performed by: FAMILY MEDICINE

## 2020-08-21 DIAGNOSIS — Z12.11 COLON CANCER SCREENING: ICD-10-CM

## 2020-09-17 ENCOUNTER — PATIENT OUTREACH (OUTPATIENT)
Dept: ADMINISTRATIVE | Facility: OTHER | Age: 65
End: 2020-09-17

## 2020-09-21 ENCOUNTER — OFFICE VISIT (OUTPATIENT)
Dept: ORTHOPEDICS | Facility: CLINIC | Age: 65
End: 2020-09-21
Payer: COMMERCIAL

## 2020-09-21 ENCOUNTER — TELEPHONE (OUTPATIENT)
Dept: ORTHOPEDICS | Facility: CLINIC | Age: 65
End: 2020-09-21

## 2020-09-21 ENCOUNTER — HOSPITAL ENCOUNTER (OUTPATIENT)
Dept: RADIOLOGY | Facility: HOSPITAL | Age: 65
Discharge: HOME | End: 2020-09-21
Attending: ORTHOPAEDIC SURGERY
Payer: COMMERCIAL

## 2020-09-21 VITALS — TEMPERATURE: 98 F

## 2020-09-21 DIAGNOSIS — M25.521 RIGHT ELBOW PAIN: ICD-10-CM

## 2020-09-21 DIAGNOSIS — M19.029 ELBOW ARTHRITIS: ICD-10-CM

## 2020-09-21 DIAGNOSIS — M25.521 RIGHT ELBOW PAIN: Primary | ICD-10-CM

## 2020-09-21 PROCEDURE — 20605 PR DRAIN/INJECT INTERMEDIATE JOINT/BURSA: ICD-10-PCS | Mod: RT,S$GLB,, | Performed by: ORTHOPAEDIC SURGERY

## 2020-09-21 PROCEDURE — 73080 X-RAY EXAM OF ELBOW: CPT | Mod: TC,PN,RT

## 2020-09-21 PROCEDURE — 99999 PR PBB SHADOW E&M-EST. PATIENT-LVL III: CPT | Mod: PBBFAC,,, | Performed by: ORTHOPAEDIC SURGERY

## 2020-09-21 PROCEDURE — 99214 PR OFFICE/OUTPT VISIT, EST, LEVL IV, 30-39 MIN: ICD-10-PCS | Mod: 25,S$GLB,, | Performed by: ORTHOPAEDIC SURGERY

## 2020-09-21 PROCEDURE — 73080 X-RAY EXAM OF ELBOW: CPT | Mod: 26,RT,, | Performed by: RADIOLOGY

## 2020-09-21 PROCEDURE — 99999 PR PBB SHADOW E&M-EST. PATIENT-LVL III: ICD-10-PCS | Mod: PBBFAC,,, | Performed by: ORTHOPAEDIC SURGERY

## 2020-09-21 PROCEDURE — 99214 OFFICE O/P EST MOD 30 MIN: CPT | Mod: 25,S$GLB,, | Performed by: ORTHOPAEDIC SURGERY

## 2020-09-21 PROCEDURE — 73080 XR ELBOW COMPLETE 3 VIEW RIGHT: ICD-10-PCS | Mod: 26,RT,, | Performed by: RADIOLOGY

## 2020-09-21 PROCEDURE — 20605 DRAIN/INJ JOINT/BURSA W/O US: CPT | Mod: RT,S$GLB,, | Performed by: ORTHOPAEDIC SURGERY

## 2020-09-21 RX ORDER — NAPROXEN 500 MG/1
500 TABLET ORAL 2 TIMES DAILY WITH MEALS
Qty: 60 TABLET | Refills: 1 | Status: SHIPPED | OUTPATIENT
Start: 2020-09-21 | End: 2022-12-15

## 2020-09-21 RX ORDER — NAPROXEN 250 MG/1
250 TABLET ORAL 2 TIMES DAILY WITH MEALS
COMMUNITY
End: 2022-12-15

## 2020-09-21 RX ORDER — TRIAMCINOLONE ACETONIDE 40 MG/ML
20 INJECTION, SUSPENSION INTRA-ARTICULAR; INTRAMUSCULAR
Status: COMPLETED | OUTPATIENT
Start: 2020-09-21 | End: 2020-09-21

## 2020-09-21 RX ADMIN — TRIAMCINOLONE ACETONIDE 20 MG: 40 INJECTION, SUSPENSION INTRA-ARTICULAR; INTRAMUSCULAR at 09:09

## 2020-09-21 NOTE — PROGRESS NOTES
Subjective:      Patient ID: Frank Gay Jr. is a 65 y.o. male.    Chief Complaint: Follow-up (right elbow pain)      HPI  Frank Gay Jr. is a  65 y.o. male presenting today for right elbow pain.  There was not a history of trauma.  Onset of symptoms began several months ago  He has been experienced a pop sensation in the right elbow the past several months associated with some pain  No numbness or tingling reported  He did have ulnar nerve transposition of the right elbow several years ago.      Review of patient's allergies indicates:   Allergen Reactions    Celebrex  [celecoxib] Blisters, Dermatitis, Edema, Hallucinations, Hives, Itching and Swelling    Dairy aid  [lactase]      Other reaction(s): stomach cramps  Other reaction(s): Itching  Other reaction(s): Diarrhea    Eliquis  [apixaban] Blisters, Edema, Hallucinations, Other (See Comments) and Swelling    Influenza a (h1n1) vac 09 (pf)     Lactose          Current Outpatient Medications   Medication Sig Dispense Refill    naproxen (NAPROSYN) 250 MG tablet Take 250 mg by mouth 2 (two) times daily with meals.       No current facility-administered medications for this visit.        Past Medical History:   Diagnosis Date    Arthritis     Elevated PSA     Personal history of venous thrombosis and embolism     After trauma in the 70's, he had a DVT with a PE    Pulmonary embolism     Sleep apnea        Past Surgical History:   Procedure Laterality Date    ADENOIDECTOMY      ADENOIDECTOMY      APPENDECTOMY      ELBOW SURGERY  05/2016    EYE SURGERY      Eyelid    KNEE SURGERY      left knee    periodontal  03/2018    PROSTATE BIOPSY      TONSILLECTOMY      TOTAL KNEE ARTHROPLASTY Right 12/18/2019    Procedure: ARTHROPLASTY, KNEE, TOTAL-DEPUY-SIGMA;  Surgeon: Ken Amezcua III, MD;  Location: [facility];  Service: Orthopedics;  Laterality: Right;       Review of Systems:  ROS    OBJECTIVE:     PHYSICAL EXAM:       Vitals:    09/21/20  0919   Temp: 98.4 °F (36.9 °C)   PainSc:   2     Well developed, well nourished male in no acute distress  Alert and oriented x 3  HEENT- Normal exam  Lungs- Clear to auscultation  Heart- Regular rate and rhythm  Abdomen- Soft nontender  Extremity exam- examination of the right elbow demonstrates some mild swelling and bony enlargement of the joint  Range of motion is slightly decreased  He has about a 20 degree flexion contracture  No instability  Tinel sign negative medially    RADIOGRAPHS:  AP and lateral x-rays right elbow demonstrate moderate degenerative changes of the elbow joint consistent with arthritis  Comments: I have personally reviewed the imaging and I agree with the above radiologist's report.    ASSESSMENT/PLAN:     IMPRESSION:  Right elbow arthritis    PLAN:  I explained the nature of the problem to the patient  Recommended injection  After pause for time-out identified the right elbow injected posterolateral joint line with combination Kenalog 20 mg 0.5 cc xylocaine sterile technique  Tolerated the procedure well without complication  Continue anti-inflammatory medication by mouth  Follow-up 4-6 weeks       - We talked at length about the anatomy and pathophysiology of   Encounter Diagnosis   Name Primary?    Elbow arthritis            Disclaimer: This note has been generated using voice-recognition software. There may be typographical errors that have been missed during proof-reading.

## 2020-09-28 ENCOUNTER — OFFICE VISIT (OUTPATIENT)
Dept: FAMILY MEDICINE | Facility: CLINIC | Age: 65
End: 2020-09-28
Payer: COMMERCIAL

## 2020-09-28 ENCOUNTER — TELEPHONE (OUTPATIENT)
Dept: FAMILY MEDICINE | Facility: CLINIC | Age: 65
End: 2020-09-28

## 2020-09-28 DIAGNOSIS — J06.9 UPPER RESPIRATORY TRACT INFECTION, UNSPECIFIED TYPE: Primary | ICD-10-CM

## 2020-09-28 PROCEDURE — 99212 PR OFFICE/OUTPT VISIT, EST, LEVL II, 10-19 MIN: ICD-10-PCS | Mod: 95,,, | Performed by: FAMILY MEDICINE

## 2020-09-28 PROCEDURE — 99212 OFFICE O/P EST SF 10 MIN: CPT | Mod: 95,,, | Performed by: FAMILY MEDICINE

## 2020-09-28 RX ORDER — METHYLPREDNISOLONE 4 MG/1
TABLET ORAL
Qty: 1 PACKAGE | Refills: 0 | Status: SHIPPED | OUTPATIENT
Start: 2020-09-28 | End: 2020-10-19

## 2020-09-28 NOTE — TELEPHONE ENCOUNTER
----- Message from Cara Shankar sent at 9/28/2020 11:55 AM CDT -----  Pt would lik return call, pt is needing to know the pharmacy prescription was sent to. Please call back at  [phone].    Md Kimani

## 2020-09-28 NOTE — TELEPHONE ENCOUNTER
----- Message from Parris Rubio sent at 9/28/2020  8:37 AM CDT -----  Regarding: appt  Contact: wife  Type:  Same Day Appointment Request    Caller is requesting a same day appointment.  Caller declined first available appointment listed below.    Name of Caller:wife  When is the first available appointment? N/a  Symptoms: cold / sinus issues  Best Call Back Number: [phone]  Additional Information: The pt wants a audio appt.

## 2020-09-28 NOTE — TELEPHONE ENCOUNTER
I don't see where I sent it-I must have hit the wrong button-please apologize-I just sent to [facility]

## 2020-09-28 NOTE — PROGRESS NOTES
The patient location is: Home  The chief complaint leading to consultation is:Upper rsp congestion   Visit type: Virtual visit with audio    Total time spent with patient: 10 minutes  Each patient to whom he or she provides medical services by telemedicine is:  (1) informed of the relationship between the physician and patient and the respective role of any other health care provider with respect to management of the patient; and (2) notified that he or she may decline to receive medical services by telemedicine and may withdraw from such care at any time.    Notes:   Frank Gay Jr. presents with moderate upper respiratory congestion,rhinnorhea,moderate cough past 2-3 days. No dyspnea Denies nausea,vomiting,diarrhea or significant fever.    Past Medical History:   Diagnosis Date    Arthritis     Elevated PSA     Personal history of venous thrombosis and embolism     After trauma in the 70's, he had a DVT with a PE    Pulmonary embolism     Sleep apnea      Past Surgical History:   Procedure Laterality Date    ADENOIDECTOMY      ADENOIDECTOMY      APPENDECTOMY      ELBOW SURGERY  05/2016    EYE SURGERY      Eyelid    KNEE SURGERY      left knee    periodontal  03/2018    PROSTATE BIOPSY      TONSILLECTOMY      TOTAL KNEE ARTHROPLASTY Right 12/18/2019    Procedure: ARTHROPLASTY, KNEE, TOTAL-DEPUY-SIGMA;  Surgeon: Ken Amezcua III, MD;  Location: [facility];  Service: Orthopedics;  Laterality: Right;     Review of patient's allergies indicates:   Allergen Reactions    Celebrex  [celecoxib] Blisters, Dermatitis, Edema, Hallucinations, Hives, Itching and Swelling    Dairy aid  [lactase]      Other reaction(s): stomach cramps  Other reaction(s): Itching  Other reaction(s): Diarrhea    Eliquis  [apixaban] Blisters, Edema, Hallucinations, Other (See Comments) and Swelling    Influenza a (h1n1) vac 09 (pf)     Lactose      Current Outpatient Medications on File Prior to Visit   Medication Sig  Dispense Refill    naproxen (EC NAPROSYN) 500 MG EC tablet Take 1 tablet (500 mg total) by mouth 2 (two) times daily with meals. 60 tablet 1    naproxen (NAPROSYN) 250 MG tablet Take 250 mg by mouth 2 (two) times daily with meals.       No current facility-administered medications on file prior to visit.      Social History     Socioeconomic History    Marital status:      Spouse name: Not on file    Number of children: Not on file    Years of education: Not on file    Highest education level: Not on file   Occupational History    Not on file   Social Needs    Financial resource strain: Not on file    Food insecurity     Worry: Not on file     Inability: Not on file    Transportation needs     Medical: Not on file     Non-medical: Not on file   Tobacco Use    Smoking status: Never Smoker    Smokeless tobacco: Former User   Substance and Sexual Activity    Alcohol use: No    Drug use: No    Sexual activity: Yes     Partners: Female   Lifestyle    Physical activity     Days per week: Not on file     Minutes per session: Not on file    Stress: Not on file   Relationships    Social connections     Talks on phone: Not on file     Gets together: Not on file     Attends Anabaptist service: Not on file     Active member of club or organization: Not on file     Attends meetings of clubs or organizations: Not on file     Relationship status: Not on file   Other Topics Concern    Not on file   Social History Narrative    Not on file     Family History   Problem Relation Age of Onset    Prostate cancer Father     Cancer Father         prostate cancer    Heart disease Mother         Age 93 Y         ROS:  SKIN: No rashes, itching or changes in color or texture of skin.  EYES: Visual acuity fine. No photophobia, ocular pain or diplopia.EARS: Denies ear pain, discharge or vertigo.NOSE: No loss of smell, no epistaxis some postnasal drip.MOUTH & THROAT: No hoarseness or change in voice. No excessive gum  bleeding.CHEST: Denies GILLIS, cyanosis, wheezing  CARDIOVASCULAR: Denies chest pain, PND, orthopnea or reduced exercise tolerance.  ABDOMEN:  No weight loss.No abdominal pain, no hematemesis or blood in stool.  URINARY: No flank pain, dysuria or hematuria.  PERIPHERAL VASCULAR: No claudication or cyanosis.  MUSCULOSKELETAL: Negative   NEUROLOGIC: No history of seizures, paralysis, alteration of gait or coordination.    PE:     Chest:No tachypnea. No wheezing, rhonchi on forced expiration  Abdomen:Soft, non tender to patient palpation  Impression: Upper Respiratory Infection. 465.9 vs allergic rhinitis  Plan: Medrol dspk and symptomatic fu

## 2020-10-05 ENCOUNTER — PATIENT MESSAGE (OUTPATIENT)
Dept: ADMINISTRATIVE | Facility: HOSPITAL | Age: 65
End: 2020-10-05

## 2020-12-01 DIAGNOSIS — Z96.651 STATUS POST TOTAL RIGHT KNEE REPLACEMENT: Primary | ICD-10-CM

## 2020-12-02 ENCOUNTER — PATIENT OUTREACH (OUTPATIENT)
Dept: ADMINISTRATIVE | Facility: OTHER | Age: 65
End: 2020-12-02

## 2020-12-04 ENCOUNTER — HOSPITAL ENCOUNTER (OUTPATIENT)
Dept: RADIOLOGY | Facility: HOSPITAL | Age: 65
Discharge: HOME | End: 2020-12-04
Attending: ORTHOPAEDIC SURGERY
Payer: COMMERCIAL

## 2020-12-04 ENCOUNTER — OFFICE VISIT (OUTPATIENT)
Dept: ORTHOPEDICS | Facility: CLINIC | Age: 65
End: 2020-12-04
Payer: COMMERCIAL

## 2020-12-04 VITALS — BODY MASS INDEX: 29.42 KG/M2 | HEIGHT: 73 IN | WEIGHT: 222 LBS

## 2020-12-04 DIAGNOSIS — M79.2 INTRACTABLE NEUROPATHIC PAIN OF RIGHT KNEE: Primary | ICD-10-CM

## 2020-12-04 DIAGNOSIS — Z96.651 STATUS POST TOTAL RIGHT KNEE REPLACEMENT: ICD-10-CM

## 2020-12-04 PROCEDURE — 99213 OFFICE O/P EST LOW 20 MIN: CPT | Mod: S$GLB,,, | Performed by: ORTHOPAEDIC SURGERY

## 2020-12-04 PROCEDURE — 73562 X-RAY EXAM OF KNEE 3: CPT | Mod: TC,RT

## 2020-12-04 PROCEDURE — 1125F PR PAIN SEVERITY QUANTIFIED, PAIN PRESENT: ICD-10-PCS | Mod: S$GLB,,, | Performed by: ORTHOPAEDIC SURGERY

## 2020-12-04 PROCEDURE — 1125F AMNT PAIN NOTED PAIN PRSNT: CPT | Mod: S$GLB,,, | Performed by: ORTHOPAEDIC SURGERY

## 2020-12-04 PROCEDURE — 3008F BODY MASS INDEX DOCD: CPT | Mod: CPTII,S$GLB,, | Performed by: ORTHOPAEDIC SURGERY

## 2020-12-04 PROCEDURE — 99999 PR PBB SHADOW E&M-EST. PATIENT-LVL II: ICD-10-PCS | Mod: PBBFAC,,, | Performed by: ORTHOPAEDIC SURGERY

## 2020-12-04 PROCEDURE — 3008F PR BODY MASS INDEX (BMI) DOCUMENTED: ICD-10-PCS | Mod: CPTII,S$GLB,, | Performed by: ORTHOPAEDIC SURGERY

## 2020-12-04 PROCEDURE — 99213 PR OFFICE/OUTPT VISIT, EST, LEVL III, 20-29 MIN: ICD-10-PCS | Mod: S$GLB,,, | Performed by: ORTHOPAEDIC SURGERY

## 2020-12-04 PROCEDURE — 73562 XR KNEE 3 VIEW RIGHT: ICD-10-PCS | Mod: 26,RT,, | Performed by: RADIOLOGY

## 2020-12-04 PROCEDURE — 73562 X-RAY EXAM OF KNEE 3: CPT | Mod: 26,RT,, | Performed by: RADIOLOGY

## 2020-12-04 PROCEDURE — 99999 PR PBB SHADOW E&M-EST. PATIENT-LVL II: CPT | Mod: PBBFAC,,, | Performed by: ORTHOPAEDIC SURGERY

## 2020-12-04 RX ORDER — PREGABALIN 75 MG/1
75 CAPSULE ORAL 2 TIMES DAILY
Qty: 90 CAPSULE | Refills: 2 | Status: SHIPPED | OUTPATIENT
Start: 2020-12-04 | End: 2022-12-15

## 2020-12-06 NOTE — PROGRESS NOTES
Subjective:     HPI:   Frank Gay Jr. is a 65 y.o. male who presents for annual follow up right TKA    Date of surgery: 12/18/19    Medications: aleve PRN, compound cream helps but too expensive to use regularly, stopped gabapentin    Assistive Devices: none    PT: finished    Limitations: prolonged walking and standing more than 2-3 hours    Patient returns per usual he has white seen irritated.  He had significant neuropathic pain after surgery.  He says his knee is still numb but the sensitivity has improved.  He says if he is on his feet for too long he gets stinging and nerve pain.  He has difficulty with uneven surfaces.  He says it goes flea market walks on for more than 2 or 3 hours that he has significant pain.    He is very frustrated but does admit that his knee is better than it was prior to surgery in terms of overall function and pain but he does not have these issues with his left knee replacement       Objective:   Body mass index is 29.29 kg/m².  Exam:    No limp nonantalgic gait but does walk with stiff legged on both sides.  0-128 degree knee range of motion 5° valgus alignment knee stable anterior-posterior varus and valgus stresses no flexion contracture or extensor lag no obvious mid flexion instability.  Mild effusion.  Right greater than left quad atrophy.  Lateral knee is numb but minimally hypersensitive much better than previously      Imaging:  Indication:  Exam status post right total knee arthroplasty  Exam Ordered: Radiographs of the right knee include a standing anteroposterior view, a lateral view, and a sunrise view  Details of Examination: Todays exam show a well fixed, well positioned total knee arthroplasty with no evidence of wear, osteolysis, or loosening.  Impression:  Status post right total knee arthroplasty, implant in good position with no abnormality         Assessment:       ICD-10-CM ICD-9-CM   1. Intractable neuropathic pain of right knee  M79.2 355.8   2. Status  post total right knee replacement 12/18/2019  Z96.651 V43.65      Neuropathic pain     Plan:       Referral out infection with inflammatory markers in the past.    I think a lot of this is neuropathic pain.    We had a long discussion of options. We discussed that we have pretty much exhausted all of the options I have for him. His knee seems to mechanically be functioning well. he does have some quad atrophy that may be contributing.      Will start prescription for Lyrica he has tried gabapentin previously.  Recommend pain clinic evaluation for geniculate blocks/ablation and discussion of there painful knee replacement trial.  I also offered him a 2nd opinion from another total joint surgeon he did not want to pursue that at this time.    5 year follow up for standard xrays would be happy to see him back any time prior to that if symptoms change      No orders of the defined types were placed in this encounter.      Medications Ordered This Encounter   Medications    pregabalin (LYRICA) 75 MG capsule     Sig: Take 1 capsule (75 mg total) by mouth 2 (two) times daily.     Dispense:  90 capsule     Refill:  2        Past Medical History:   Diagnosis Date    Arthritis     Elevated PSA     Personal history of venous thrombosis and embolism     After trauma in the 70's, he had a DVT with a PE    Pulmonary embolism     Sleep apnea        Past Surgical History:   Procedure Laterality Date    ADENOIDECTOMY      ADENOIDECTOMY      APPENDECTOMY      ELBOW SURGERY  05/2016    EYE SURGERY      Eyelid    KNEE SURGERY      left knee    periodontal  03/2018    PROSTATE BIOPSY      TONSILLECTOMY      TOTAL KNEE ARTHROPLASTY Right 12/18/2019    Procedure: ARTHROPLASTY, KNEE, TOTAL-DEPUY-SIGMA;  Surgeon: Ken Amezcua III, MD;  Location: [facility];  Service: Orthopedics;  Laterality: Right;       Family History   Problem Relation Age of Onset    Prostate cancer Father     Cancer Father         prostate cancer     Heart disease Mother         Age 93 Y       Social History     Socioeconomic History    Marital status:      Spouse name: Not on file    Number of children: Not on file    Years of education: Not on file    Highest education level: Not on file   Occupational History    Not on file   Social Needs    Financial resource strain: Not on file    Food insecurity     Worry: Not on file     Inability: Not on file    Transportation needs     Medical: Not on file     Non-medical: Not on file   Tobacco Use    Smoking status: Never Smoker    Smokeless tobacco: Former User   Substance and Sexual Activity    Alcohol use: No    Drug use: No    Sexual activity: Yes     Partners: Female   Lifestyle    Physical activity     Days per week: Not on file     Minutes per session: Not on file    Stress: Not on file   Relationships    Social connections     Talks on phone: Not on file     Gets together: Not on file     Attends [employer] service: Not on file     Active member of club or organization: Not on file     Attends meetings of clubs or organizations: Not on file     Relationship status: Not on file   Other Topics Concern    Not on file   Social History Narrative    Not on file

## 2021-04-13 ENCOUNTER — PATIENT MESSAGE (OUTPATIENT)
Dept: ADMINISTRATIVE | Facility: HOSPITAL | Age: 66
End: 2021-04-13

## 2021-04-14 ENCOUNTER — PATIENT OUTREACH (OUTPATIENT)
Dept: ADMINISTRATIVE | Facility: HOSPITAL | Age: 66
End: 2021-04-14

## 2021-10-03 DIAGNOSIS — Z96.651 STATUS POST TOTAL RIGHT KNEE REPLACEMENT: ICD-10-CM

## 2021-10-08 ENCOUNTER — PATIENT MESSAGE (OUTPATIENT)
Dept: FAMILY MEDICINE | Facility: CLINIC | Age: 66
End: 2021-10-08

## 2022-07-08 ENCOUNTER — PATIENT MESSAGE (OUTPATIENT)
Dept: FAMILY MEDICINE | Facility: CLINIC | Age: 67
End: 2022-07-08
Payer: COMMERCIAL

## 2022-07-11 ENCOUNTER — PATIENT MESSAGE (OUTPATIENT)
Dept: ORTHOPEDICS | Facility: CLINIC | Age: 67
End: 2022-07-11
Payer: COMMERCIAL

## 2022-07-11 DIAGNOSIS — Z12.11 SCREENING FOR COLON CANCER: ICD-10-CM

## 2022-07-26 ENCOUNTER — TELEPHONE (OUTPATIENT)
Dept: INTERNAL MEDICINE | Facility: CLINIC | Age: 67
End: 2022-07-26
Payer: COMMERCIAL

## 2022-07-26 NOTE — TELEPHONE ENCOUNTER
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 Please call Laisha Gay [phone]. Thank you

## 2022-10-03 ENCOUNTER — PATIENT MESSAGE (OUTPATIENT)
Dept: ADMINISTRATIVE | Facility: HOSPITAL | Age: 67
End: 2022-10-03
Payer: COMMERCIAL

## 2022-12-15 ENCOUNTER — OFFICE VISIT (OUTPATIENT)
Dept: PODIATRY | Facility: CLINIC | Age: 67
End: 2022-12-15
Payer: COMMERCIAL

## 2022-12-15 ENCOUNTER — HOSPITAL ENCOUNTER (OUTPATIENT)
Dept: RADIOLOGY | Facility: HOSPITAL | Age: 67
Discharge: HOME | End: 2022-12-15
Attending: PODIATRIST
Payer: COMMERCIAL

## 2022-12-15 VITALS — BODY MASS INDEX: 29.42 KG/M2 | HEIGHT: 73 IN | WEIGHT: 222 LBS

## 2022-12-15 DIAGNOSIS — M19.072 OSTEOARTHRITIS OF LEFT ANKLE OR FOOT: Primary | ICD-10-CM

## 2022-12-15 DIAGNOSIS — M79.662 PAIN IN LEFT LOWER LEG: ICD-10-CM

## 2022-12-15 DIAGNOSIS — M25.572 PAIN IN JOINT INVOLVING LEFT ANKLE AND FOOT: ICD-10-CM

## 2022-12-15 DIAGNOSIS — Z96.652 HISTORY OF TOTAL KNEE REPLACEMENT, LEFT: ICD-10-CM

## 2022-12-15 DIAGNOSIS — Z87.81 HISTORY OF FRACTURE OF TIBIA: ICD-10-CM

## 2022-12-15 DIAGNOSIS — M79.672 PAIN IN LEFT FOOT: ICD-10-CM

## 2022-12-15 DIAGNOSIS — M12.572 TRAUMATIC ARTHRITIS OF ANKLE, LEFT: ICD-10-CM

## 2022-12-15 PROCEDURE — 99999 PR PBB SHADOW E&M-EST. PATIENT-LVL III: ICD-10-PCS | Mod: PBBFAC,,, | Performed by: PODIATRIST

## 2022-12-15 PROCEDURE — 73590 XR TIBIA FIBULA 2 VIEW LEFT: ICD-10-PCS | Mod: 26,LT,, | Performed by: RADIOLOGY

## 2022-12-15 PROCEDURE — 73590 X-RAY EXAM OF LOWER LEG: CPT | Mod: 26,LT,, | Performed by: RADIOLOGY

## 2022-12-15 PROCEDURE — 73610 X-RAY EXAM OF ANKLE: CPT | Mod: 26,LT,, | Performed by: RADIOLOGY

## 2022-12-15 PROCEDURE — 73590 X-RAY EXAM OF LOWER LEG: CPT | Mod: TC,LT

## 2022-12-15 PROCEDURE — 3008F PR BODY MASS INDEX (BMI) DOCUMENTED: ICD-10-PCS | Mod: CPTII,S$GLB,, | Performed by: PODIATRIST

## 2022-12-15 PROCEDURE — 73562 X-RAY EXAM OF KNEE 3: CPT | Mod: TC,LT

## 2022-12-15 PROCEDURE — 1160F RVW MEDS BY RX/DR IN RCRD: CPT | Mod: CPTII,S$GLB,, | Performed by: PODIATRIST

## 2022-12-15 PROCEDURE — 1159F PR MEDICATION LIST DOCUMENTED IN MEDICAL RECORD: ICD-10-PCS | Mod: CPTII,S$GLB,, | Performed by: PODIATRIST

## 2022-12-15 PROCEDURE — 73610 XR ANKLE COMPLETE 3 VIEW LEFT: ICD-10-PCS | Mod: 26,LT,, | Performed by: RADIOLOGY

## 2022-12-15 PROCEDURE — 1160F PR REVIEW ALL MEDS BY PRESCRIBER/CLIN PHARMACIST DOCUMENTED: ICD-10-PCS | Mod: CPTII,S$GLB,, | Performed by: PODIATRIST

## 2022-12-15 PROCEDURE — 1159F MED LIST DOCD IN RCRD: CPT | Mod: CPTII,S$GLB,, | Performed by: PODIATRIST

## 2022-12-15 PROCEDURE — 3008F BODY MASS INDEX DOCD: CPT | Mod: CPTII,S$GLB,, | Performed by: PODIATRIST

## 2022-12-15 PROCEDURE — 99204 OFFICE O/P NEW MOD 45 MIN: CPT | Mod: S$GLB,,, | Performed by: PODIATRIST

## 2022-12-15 PROCEDURE — 73630 X-RAY EXAM OF FOOT: CPT | Mod: 26,LT,, | Performed by: RADIOLOGY

## 2022-12-15 PROCEDURE — 73630 X-RAY EXAM OF FOOT: CPT | Mod: TC,LT

## 2022-12-15 PROCEDURE — 73630 XR FOOT COMPLETE 3 VIEW LEFT: ICD-10-PCS | Mod: 26,LT,, | Performed by: RADIOLOGY

## 2022-12-15 PROCEDURE — 99999 PR PBB SHADOW E&M-EST. PATIENT-LVL III: CPT | Mod: PBBFAC,,, | Performed by: PODIATRIST

## 2022-12-15 PROCEDURE — 73562 XR KNEE 3 VIEW LEFT: ICD-10-PCS | Mod: 26,LT,, | Performed by: RADIOLOGY

## 2022-12-15 PROCEDURE — 73562 X-RAY EXAM OF KNEE 3: CPT | Mod: 26,LT,, | Performed by: RADIOLOGY

## 2022-12-15 PROCEDURE — 73610 X-RAY EXAM OF ANKLE: CPT | Mod: TC,LT

## 2022-12-15 PROCEDURE — 99204 PR OFFICE/OUTPT VISIT, NEW, LEVL IV, 45-59 MIN: ICD-10-PCS | Mod: S$GLB,,, | Performed by: PODIATRIST

## 2022-12-15 NOTE — PROGRESS NOTES
Subjective:       Patient ID: Frank Gay Jr. is a 67 y.o. male.    Chief Complaint: Ankle Pain (Mild pain to left ankle. Non diabetic PCP: Dr. Barron)      HPI: Frank Gay Jr. presents to the clinic today for evaluation concerning stated moderate pains to the left foot/ankle. Patient states pains are approx. 4/10. Pains are described as achy and sharp with gait. Pains have been present for duration of several years. Patient states the pains are exacerbated with walking and standing and prolonged activities. States prior medical evaluation by a MD/DO/DPM/NP. States occasional Tylenol or NSAIDs. Trauma is stated years ago (fractured left tibia in 1977). Patient's Primary Care Provider is Zeeshan Barron MD. Has failed bracing and boots/shoes/etc...    Review of patient's allergies indicates:   Allergen Reactions    Celebrex  [celecoxib] Blisters, Dermatitis, Edema, Hallucinations, Hives, Itching and Swelling    Dairy aid  [lactase]      Other reaction(s): stomach cramps  Other reaction(s): Itching  Other reaction(s): Diarrhea    Eliquis  [apixaban] Blisters, Edema, Hallucinations, Other (See Comments) and Swelling    Influenza a (h1n1) vac 09 (pf)     Lactose        Past Medical History:   Diagnosis Date    Arthritis     Elevated PSA     Personal history of venous thrombosis and embolism     After trauma in the 70's, he had a DVT with a PE    Pulmonary embolism     Sleep apnea        Family History   Problem Relation Age of Onset    Prostate cancer Father     Cancer Father         prostate cancer    Heart disease Mother         Age 93 Y       Social History     Socioeconomic History    Marital status:    Tobacco Use    Smoking status: Never    Smokeless tobacco: Former     Quit date: 7/1/2017   Substance and Sexual Activity    Alcohol use: No    Drug use: No    Sexual activity: Yes     Partners: Female       Past Surgical History:   Procedure Laterality Date    ADENOIDECTOMY      ADENOIDECTOMY       "APPENDECTOMY      ELBOW SURGERY  05/2016    EYE SURGERY      Eyelid    KNEE SURGERY      left knee    periodontal  03/2018    PROSTATE BIOPSY      TONSILLECTOMY      TOTAL KNEE ARTHROPLASTY Right 12/18/2019    Procedure: ARTHROPLASTY, KNEE, TOTAL-DEPUY-SIGMA;  Surgeon: Ken Amezcua III, MD;  Location: [facility];  Service: Orthopedics;  Laterality: Right;       Review of Systems   Constitutional:  Negative for chills, fatigue and fever.   HENT:  Negative for hearing loss.    Eyes:  Negative for photophobia and visual disturbance.   Respiratory:  Negative for cough, chest tightness, shortness of breath and wheezing.    Cardiovascular:  Negative for chest pain and palpitations.   Gastrointestinal:  Negative for constipation, diarrhea, nausea and vomiting.   Endocrine: Negative for cold intolerance and heat intolerance.   Genitourinary:  Negative for flank pain.   Musculoskeletal:  Positive for arthralgias and gait problem. Negative for neck pain and neck stiffness.   Neurological:  Negative for light-headedness and headaches.   Psychiatric/Behavioral:  Negative for sleep disturbance.        Objective:   Ht 6' 1" (1.854 m)   Wt 100.7 kg (222 lb 0.1 oz)   BMI 29.29 kg/m²     Physical Exam    LOWER EXTREMITY PHYSICAL EXAMINATION    VASCULAR: The LLE DP is 1/4 and the PT is 2/4. Capillary refill time is less than 3 seconds. Hair growth is appreciated to the dorsal foot and digits. Mild edema is noted.     DERMATOLOGY: Skin is supple, dry and intact.     ORTHOPEDIC: Decreased ankle ROM is noted on the LLE. Moderate pains to the AM and AL gutters on the left. Mild supinatus attitude of the hindfoot/ankle is noted. No appreciable ankle dorsiflexion is noted. The ankle is full and bulbous with osteophytes and spurring. Antalgic gait is noted.     Assessment:     1. Osteoarthritis of left ankle or foot    2. Traumatic arthritis of ankle, left    3. Pain in joint involving left ankle and foot    4. History of fracture of " tibia    5. Pain in left lower leg    6. History of total knee replacement, left          Plan:     Osteoarthritis of left ankle or foot  -     CT Ankle (Including Hindfoot) Without Contrast Left; Future; Expected date: 12/15/2022    Traumatic arthritis of ankle, left  -     CT Ankle (Including Hindfoot) Without Contrast Left; Future; Expected date: 12/15/2022    Pain in joint involving left ankle and foot  -     X-Ray Foot Complete Left; Future; Expected date: 12/15/2022  -     X-Ray Ankle Complete Left; Future; Expected date: 12/15/2022  -     CT Ankle (Including Hindfoot) Without Contrast Left; Future; Expected date: 12/15/2022    History of fracture of tibia    Pain in left lower leg  -     X-Ray Tibia Fibula 2 View Left; Future; Expected date: 12/22/2022    History of total knee replacement, left  -     X-Ray Knee 3 View Left; Future; Expected date: 12/15/2022      Thorough discussion is had with the patient today, concerning the diagnosis, its etiology, and the treatment algorithm at present.     XRAYS are reviewed in detail with the patient. All questions and concerns regarding findings and its/their implications are outlined and discussed.    Has failed [employer] [facility] and [facility] and special DME.    Please obtain XR Foot, XR Ankle, XR TibFib and XR Left Knee.    The procedure of (LLE TAR vs TT vs TTC fusion) was thoroughly explained to the patient. Its necessity and/or purpose and the implications therein were outlined, including any pertinent advantages and/or disadvantages, and possible complications, if any. Possible complications include recurrence of pathology and/or deformity, infection (cellulitis, drainage, purulence, malodor, etc...), pain, numbness, neuritis, edema, burning, loss of function, need for further surgery, possible need for removal of any implanted hardware, soft tissue contracture and/or scarring, etc... No guarantees were given and/or implied. Post-operative expectations and  weightbearing protocol is thoroughly explained to the patient, who acknowledges understanding.        Future Appointments   Date Time Provider Department Center   12/15/2022  3:45 PM [facility]   12/21/2022  4:45 PM [facility] CT1 LIMIT 500 LBS [facility] CT SCAN [place]

## 2022-12-23 ENCOUNTER — PATIENT MESSAGE (OUTPATIENT)
Dept: FAMILY MEDICINE | Facility: CLINIC | Age: 67
End: 2022-12-23
Payer: COMMERCIAL

## 2022-12-27 ENCOUNTER — PATIENT MESSAGE (OUTPATIENT)
Dept: PODIATRY | Facility: CLINIC | Age: 67
End: 2022-12-27
Payer: COMMERCIAL

## 2023-07-13 ENCOUNTER — OFFICE VISIT (OUTPATIENT)
Dept: FAMILY MEDICINE | Facility: CLINIC | Age: 68
End: 2023-07-13
Payer: COMMERCIAL

## 2023-07-13 VITALS
HEART RATE: 46 BPM | SYSTOLIC BLOOD PRESSURE: 127 MMHG | HEIGHT: 74 IN | DIASTOLIC BLOOD PRESSURE: 66 MMHG | BODY MASS INDEX: 28.9 KG/M2 | WEIGHT: 225.19 LBS

## 2023-07-13 DIAGNOSIS — M10.9 ACUTE GOUT OF LEFT HAND, UNSPECIFIED CAUSE: Primary | ICD-10-CM

## 2023-07-13 DIAGNOSIS — Z12.5 SCREENING FOR PROSTATE CANCER: ICD-10-CM

## 2023-07-13 DIAGNOSIS — E78.5 HYPERLIPIDEMIA, UNSPECIFIED HYPERLIPIDEMIA TYPE: ICD-10-CM

## 2023-07-13 PROCEDURE — 3078F DIAST BP <80 MM HG: CPT | Mod: CPTII,S$GLB,, | Performed by: NURSE PRACTITIONER

## 2023-07-13 PROCEDURE — 3008F PR BODY MASS INDEX (BMI) DOCUMENTED: ICD-10-PCS | Mod: CPTII,S$GLB,, | Performed by: NURSE PRACTITIONER

## 2023-07-13 PROCEDURE — 99214 OFFICE O/P EST MOD 30 MIN: CPT | Mod: S$GLB,,, | Performed by: NURSE PRACTITIONER

## 2023-07-13 PROCEDURE — 1101F PR PT FALLS ASSESS DOC 0-1 FALLS W/OUT INJ PAST YR: ICD-10-PCS | Mod: CPTII,S$GLB,, | Performed by: NURSE PRACTITIONER

## 2023-07-13 PROCEDURE — 3078F PR MOST RECENT DIASTOLIC BLOOD PRESSURE < 80 MM HG: ICD-10-PCS | Mod: CPTII,S$GLB,, | Performed by: NURSE PRACTITIONER

## 2023-07-13 PROCEDURE — 1101F PT FALLS ASSESS-DOCD LE1/YR: CPT | Mod: CPTII,S$GLB,, | Performed by: NURSE PRACTITIONER

## 2023-07-13 PROCEDURE — 99999 PR PBB SHADOW E&M-EST. PATIENT-LVL III: ICD-10-PCS | Mod: PBBFAC,,, | Performed by: NURSE PRACTITIONER

## 2023-07-13 PROCEDURE — 1159F MED LIST DOCD IN RCRD: CPT | Mod: CPTII,S$GLB,, | Performed by: NURSE PRACTITIONER

## 2023-07-13 PROCEDURE — 1160F RVW MEDS BY RX/DR IN RCRD: CPT | Mod: CPTII,S$GLB,, | Performed by: NURSE PRACTITIONER

## 2023-07-13 PROCEDURE — 1160F PR REVIEW ALL MEDS BY PRESCRIBER/CLIN PHARMACIST DOCUMENTED: ICD-10-PCS | Mod: CPTII,S$GLB,, | Performed by: NURSE PRACTITIONER

## 2023-07-13 PROCEDURE — 99214 PR OFFICE/OUTPT VISIT, EST, LEVL IV, 30-39 MIN: ICD-10-PCS | Mod: S$GLB,,, | Performed by: NURSE PRACTITIONER

## 2023-07-13 PROCEDURE — 3074F SYST BP LT 130 MM HG: CPT | Mod: CPTII,S$GLB,, | Performed by: NURSE PRACTITIONER

## 2023-07-13 PROCEDURE — 1159F PR MEDICATION LIST DOCUMENTED IN MEDICAL RECORD: ICD-10-PCS | Mod: CPTII,S$GLB,, | Performed by: NURSE PRACTITIONER

## 2023-07-13 PROCEDURE — 3288F FALL RISK ASSESSMENT DOCD: CPT | Mod: CPTII,S$GLB,, | Performed by: NURSE PRACTITIONER

## 2023-07-13 PROCEDURE — 3074F PR MOST RECENT SYSTOLIC BLOOD PRESSURE < 130 MM HG: ICD-10-PCS | Mod: CPTII,S$GLB,, | Performed by: NURSE PRACTITIONER

## 2023-07-13 PROCEDURE — 99999 PR PBB SHADOW E&M-EST. PATIENT-LVL III: CPT | Mod: PBBFAC,,, | Performed by: NURSE PRACTITIONER

## 2023-07-13 PROCEDURE — 3288F PR FALLS RISK ASSESSMENT DOCUMENTED: ICD-10-PCS | Mod: CPTII,S$GLB,, | Performed by: NURSE PRACTITIONER

## 2023-07-13 PROCEDURE — 3008F BODY MASS INDEX DOCD: CPT | Mod: CPTII,S$GLB,, | Performed by: NURSE PRACTITIONER

## 2023-07-13 RX ORDER — COLCHICINE 0.6 MG/1
TABLET ORAL
Qty: 10 TABLET | Refills: 2 | Status: SHIPPED | OUTPATIENT
Start: 2023-07-13 | End: 2023-10-30 | Stop reason: CLARIF

## 2023-07-13 NOTE — PROGRESS NOTES
Subjective:       Patient ID: Frank Gay Jr. is a 67 y.o. male.    Chief Complaint: No chief complaint on file.    HPI    He comes in for follow up from urgent care. He reports a couple episodes of joint pain and swelling. Initially it was his left thumb but progressed to involve his right thumb and left knee. He was treated with oral steroids His symptoms improved until he discontinued the medication.     Problem List Items Addressed This Visit          Cardiac/Vascular    Hyperlipidemia    Overview     The patient presents with hyperlipidemia.  The patient reports tolerating the medication well and is in excellent compliance.  There have been no medication side effects.  The patient denies chest pain, neuropathy, and myalgias.  The patient has reduced fat intake and has been exercising.  Current treatment has included the medications listed in the med card.    Lab Results   Component Value Date    CHOL 206 (H) 06/11/2014    CHOL 183 06/10/2008    CHOL 181 05/24/2004       Lab Results   Component Value Date    HDL 45 06/11/2014    HDL 44 06/10/2008    HDL 47.0 05/24/2004       Lab Results   Component Value Date    LDLCALC 140.6 06/11/2014    LDLCALC 126.6 06/10/2008    LDLCALC 121.2 05/24/2004       Lab Results   Component Value Date    TRIG 102 06/11/2014    TRIG 62 06/10/2008    TRIG 64 05/24/2004       Lab Results   Component Value Date    CHOLHDL 21.8 06/11/2014    CHOLHDL 24.0 06/10/2008    CHOLHDL 26.0 05/24/2004     Lab Results   Component Value Date    ALT 26 12/20/2019    AST 22 12/20/2019    ALKPHOS 45 12/20/2019    BILITOT 0.3 12/20/2019                  Relevant Orders    Lipid Panel    Comprehensive Metabolic Panel    Hemoglobin A1C    PSA, Screening     Other Visit Diagnoses       Acute gout of left hand, unspecified cause    -  Primary    Relevant Orders    Uric Acid    Sedimentation rate    Lipid Panel    Comprehensive Metabolic Panel    Hemoglobin A1C    PSA, Screening    Screening for  prostate cancer        Relevant Orders    PSA, Screening              Review of Systems   Constitutional:  Negative for fatigue, fever and unexpected weight change.   HENT:  Negative for ear pain and sore throat.    Eyes: Negative.  Negative for pain and visual disturbance.   Respiratory:  Negative for cough and shortness of breath.    Cardiovascular:  Negative for chest pain and palpitations.   Gastrointestinal:  Negative for abdominal pain, diarrhea, nausea and vomiting.   Genitourinary:  Negative for dysuria and frequency.   Musculoskeletal:  Positive for arthralgias. Negative for myalgias.   Skin:  Negative for color change and rash.   Neurological:  Negative for dizziness and headaches.   Psychiatric/Behavioral:  Negative for sleep disturbance. The patient is not nervous/anxious.      Vitals:    07/13/23 0735   BP: 127/66   Pulse: (!) 46       Objective:     Current Outpatient Medications   Medication Sig Dispense Refill    colchicine (COLCRYS) 0.6 mg tablet Take 1 tablet PO BID for 3-5 days until symptoms resolve 10 tablet 2     No current facility-administered medications for this visit.       Physical Exam  Vitals and nursing note reviewed.   Constitutional:       General: He is not in acute distress.     Appearance: He is well-developed.   HENT:      Head: Normocephalic and atraumatic.   Eyes:      Pupils: Pupils are equal, round, and reactive to light.   Cardiovascular:      Rate and Rhythm: Normal rate and regular rhythm.   Pulmonary:      Effort: Pulmonary effort is normal.      Breath sounds: Normal breath sounds.   Musculoskeletal:         General: Normal range of motion.      Cervical back: Normal range of motion and neck supple.   Skin:     General: Skin is warm and dry.      Findings: No rash.   Neurological:      Mental Status: He is alert and oriented to person, place, and time.   Psychiatric:         Thought Content: Thought content normal.       Assessment:       1. Acute gout of left hand,  unspecified cause    2. Hyperlipidemia, unspecified hyperlipidemia type    3. Screening for prostate cancer        Plan:   Acute gout of left hand, unspecified cause  -     Cancel: URIC ACID; Future; Expected date: 07/13/2023  -     Cancel: Sedimentation rate; Future; Expected date: 07/13/2023  -     Uric Acid; Future; Expected date: 07/13/2023  -     Sedimentation rate; Future; Expected date: 07/13/2023  -     Lipid Panel; Future; Expected date: 07/13/2023  -     Comprehensive Metabolic Panel; Future; Expected date: 07/13/2023  -     Hemoglobin A1C; Future; Expected date: 07/13/2023  -     PSA, Screening; Future; Expected date: 07/13/2023    Hyperlipidemia, unspecified hyperlipidemia type  -     Lipid Panel; Future; Expected date: 07/13/2023  -     Comprehensive Metabolic Panel; Future; Expected date: 07/13/2023  -     Hemoglobin A1C; Future; Expected date: 07/13/2023  -     PSA, Screening; Future; Expected date: 07/13/2023    Screening for prostate cancer  -     PSA, Screening; Future; Expected date: 07/13/2023    Other orders  -     colchicine (COLCRYS) 0.6 mg tablet; Take 1 tablet PO BID for 3-5 days until symptoms resolve  Dispense: 10 tablet; Refill: 2        No follow-ups on file.    There are no Patient Instructions on file for this visit.

## 2023-07-25 LAB
ALBUMIN SERPL-MCNC: 4.2 G/DL (ref 3.6–5.1)
ALBUMIN/GLOB SERPL: 2.1 (CALC) (ref 1–2.5)
ALP SERPL-CCNC: 61 U/L (ref 35–144)
ALT SERPL-CCNC: 12 U/L (ref 9–46)
AST SERPL-CCNC: 14 U/L (ref 10–35)
BILIRUB SERPL-MCNC: 0.4 MG/DL (ref 0.2–1.2)
BUN SERPL-MCNC: 20 MG/DL (ref 7–25)
BUN/CREAT SERPL: NORMAL (CALC) (ref 6–22)
CALCIUM SERPL-MCNC: 9.2 MG/DL (ref 8.6–10.3)
CHLORIDE SERPL-SCNC: 107 MMOL/L (ref 98–110)
CHOLEST SERPL-MCNC: 188 MG/DL
CHOLEST/HDLC SERPL: 3.8 (CALC)
CO2 SERPL-SCNC: 26 MMOL/L (ref 20–32)
CREAT SERPL-MCNC: 0.75 MG/DL (ref 0.7–1.35)
EGFR: 99 ML/MIN/1.73M2
GLOBULIN SER CALC-MCNC: 2 G/DL (CALC) (ref 1.9–3.7)
GLUCOSE SERPL-MCNC: 96 MG/DL (ref 65–99)
HBA1C MFR BLD: 5.4 % OF TOTAL HGB
HDLC SERPL-MCNC: 50 MG/DL
LDLC SERPL CALC-MCNC: 121 MG/DL (CALC)
NONHDLC SERPL-MCNC: 138 MG/DL (CALC)
POTASSIUM SERPL-SCNC: 4.4 MMOL/L (ref 3.5–5.3)
PROT SERPL-MCNC: 6.2 G/DL (ref 6.1–8.1)
PSA SERPL-MCNC: 15.48 NG/ML
SODIUM SERPL-SCNC: 140 MMOL/L (ref 135–146)
TRIGL SERPL-MCNC: 78 MG/DL

## 2023-07-27 DIAGNOSIS — R97.20 ELEVATED PSA: Primary | ICD-10-CM

## 2023-07-27 NOTE — PROGRESS NOTES
There has been an increase in the PSA at this time so evaluation with a urologist is important to plan for.  I will place a referral at this time so that they can evaluation what might be our next step in evaluating this.      The uric acid is normal at this time.  The sed rate is also normal. The sedimentation rate test indicates that you do not have significant inflammation in your body.     The CMP is a comprehensive metabolic panel of all electrolytes including a look at the liver, kidney and to look at your sugar level.  All of the numbers appear acceptable and no changes are recommended.    The A1c screening for diabetes is normal.

## 2023-07-28 LAB
ERYTHROCYTE [SEDIMENTATION RATE] IN BLOOD BY WESTERGREN METHOD: NORMAL MM/H
URATE SERPL-MCNC: 4.3 MG/DL (ref 4–8)

## 2023-08-15 ENCOUNTER — TELEPHONE (OUTPATIENT)
Dept: FAMILY MEDICINE | Facility: CLINIC | Age: 68
End: 2023-08-15
Payer: COMMERCIAL

## 2023-08-15 DIAGNOSIS — R97.20 ELEVATED PSA: Primary | ICD-10-CM

## 2023-08-15 NOTE — TELEPHONE ENCOUNTER
I have attempted to contact this patient by phone with the following results: no answer and voicemail has not been set up.

## 2023-08-15 NOTE — TELEPHONE ENCOUNTER
----- Message from Jordana Montano sent at 8/15/2023  2:58 PM CDT -----  Regarding: Urology Referral  We have attempted to reach this patient 4x regarding scheduling with no call back. Thank you for this referral. Due to no contact back from the patient we will have to close it out. The patient is more than welcome to contact us regarding scheduling.    8/15 no vm set up   8/11 no vm set up  8/4 sent [employer] message  7/28 natalie will call back to schedule    Thanks    Jordana CALLOWAY   Patient Access Navigator  [facility]

## 2023-08-15 NOTE — TELEPHONE ENCOUNTER
I called and spoke with him last Friday. He and I spoke about his PSA which is much higher.  He states that he had a biopsy in the past and it was negative but he did not let them do all of the biopsies and made them stop.  His PSA has risen now and he understands I am concerned that he may have a cancer.  He is agreeable at this time to meet with a urologist.  Please arrange the visit and speak with him about the time and date to be sure he can go.I have signed for the following orders AND/OR meds.  Please call the patient and ask the patient to schedule the testing AND/OR inform about any medications that were sent.      Orders Placed This Encounter   Procedures    Ambulatory referral/consult to Urology     Standing Status:   Future     Standing Expiration Date:   9/15/2024     Referral Priority:   Routine     Referral Type:   Consultation     Referral Reason:   Specialty Services Required     Requested Specialty:   Urology     Number of Visits Requested:   1            @[facility]@

## 2023-08-15 NOTE — TELEPHONE ENCOUNTER
----- Message from Arelis Street LPN sent at 7/28/2023  9:15 AM CDT -----  Pt viewed test results, I spoke to pt also to assist him in scheduling urology appt. He declined to schedule at this time, states he wants to think about it. Advised pt that it was important to have this evaluated.

## 2023-09-05 ENCOUNTER — OFFICE VISIT (OUTPATIENT)
Dept: UROLOGY | Facility: CLINIC | Age: 68
End: 2023-09-05
Payer: COMMERCIAL

## 2023-09-05 ENCOUNTER — LAB VISIT (OUTPATIENT)
Dept: LAB | Facility: HOSPITAL | Age: 68
End: 2023-09-05
Attending: STUDENT IN AN ORGANIZED HEALTH CARE EDUCATION/TRAINING PROGRAM
Payer: COMMERCIAL

## 2023-09-05 VITALS — BODY MASS INDEX: 28.94 KG/M2 | WEIGHT: 225.5 LBS | HEIGHT: 74 IN

## 2023-09-05 DIAGNOSIS — R97.20 ELEVATED PSA: Primary | ICD-10-CM

## 2023-09-05 DIAGNOSIS — R97.20 ELEVATED PSA: ICD-10-CM

## 2023-09-05 LAB
BILIRUBIN, UA POC OHS: NEGATIVE
BLOOD, UA POC OHS: NEGATIVE
CLARITY, UA POC OHS: CLEAR
COLOR, UA POC OHS: YELLOW
COMPLEXED PSA SERPL-MCNC: 22.2 NG/ML (ref 0–4)
GLUCOSE, UA POC OHS: NEGATIVE
KETONES, UA POC OHS: NEGATIVE
LEUKOCYTES, UA POC OHS: NEGATIVE
NITRITE, UA POC OHS: NEGATIVE
PH, UA POC OHS: 6.5
PROTEIN, UA POC OHS: NEGATIVE
SPECIFIC GRAVITY, UA POC OHS: 1.02
UROBILINOGEN, UA POC OHS: 1

## 2023-09-05 PROCEDURE — 1101F PR PT FALLS ASSESS DOC 0-1 FALLS W/OUT INJ PAST YR: ICD-10-PCS | Mod: CPTII,S$GLB,, | Performed by: STUDENT IN AN ORGANIZED HEALTH CARE EDUCATION/TRAINING PROGRAM

## 2023-09-05 PROCEDURE — 99204 PR OFFICE/OUTPT VISIT, NEW, LEVL IV, 45-59 MIN: ICD-10-PCS | Mod: S$GLB,,, | Performed by: STUDENT IN AN ORGANIZED HEALTH CARE EDUCATION/TRAINING PROGRAM

## 2023-09-05 PROCEDURE — 3288F PR FALLS RISK ASSESSMENT DOCUMENTED: ICD-10-PCS | Mod: CPTII,S$GLB,, | Performed by: STUDENT IN AN ORGANIZED HEALTH CARE EDUCATION/TRAINING PROGRAM

## 2023-09-05 PROCEDURE — 3008F BODY MASS INDEX DOCD: CPT | Mod: CPTII,S$GLB,, | Performed by: STUDENT IN AN ORGANIZED HEALTH CARE EDUCATION/TRAINING PROGRAM

## 2023-09-05 PROCEDURE — 1101F PT FALLS ASSESS-DOCD LE1/YR: CPT | Mod: CPTII,S$GLB,, | Performed by: STUDENT IN AN ORGANIZED HEALTH CARE EDUCATION/TRAINING PROGRAM

## 2023-09-05 PROCEDURE — 3044F HG A1C LEVEL LT 7.0%: CPT | Mod: CPTII,S$GLB,, | Performed by: STUDENT IN AN ORGANIZED HEALTH CARE EDUCATION/TRAINING PROGRAM

## 2023-09-05 PROCEDURE — 36415 COLL VENOUS BLD VENIPUNCTURE: CPT | Mod: PO | Performed by: STUDENT IN AN ORGANIZED HEALTH CARE EDUCATION/TRAINING PROGRAM

## 2023-09-05 PROCEDURE — 1160F PR REVIEW ALL MEDS BY PRESCRIBER/CLIN PHARMACIST DOCUMENTED: ICD-10-PCS | Mod: CPTII,S$GLB,, | Performed by: STUDENT IN AN ORGANIZED HEALTH CARE EDUCATION/TRAINING PROGRAM

## 2023-09-05 PROCEDURE — 3288F FALL RISK ASSESSMENT DOCD: CPT | Mod: CPTII,S$GLB,, | Performed by: STUDENT IN AN ORGANIZED HEALTH CARE EDUCATION/TRAINING PROGRAM

## 2023-09-05 PROCEDURE — 81003 URINALYSIS AUTO W/O SCOPE: CPT | Mod: QW,S$GLB,, | Performed by: STUDENT IN AN ORGANIZED HEALTH CARE EDUCATION/TRAINING PROGRAM

## 2023-09-05 PROCEDURE — 1160F RVW MEDS BY RX/DR IN RCRD: CPT | Mod: CPTII,S$GLB,, | Performed by: STUDENT IN AN ORGANIZED HEALTH CARE EDUCATION/TRAINING PROGRAM

## 2023-09-05 PROCEDURE — 84153 ASSAY OF PSA TOTAL: CPT | Performed by: STUDENT IN AN ORGANIZED HEALTH CARE EDUCATION/TRAINING PROGRAM

## 2023-09-05 PROCEDURE — 99999 PR PBB SHADOW E&M-EST. PATIENT-LVL III: ICD-10-PCS | Mod: PBBFAC,,, | Performed by: STUDENT IN AN ORGANIZED HEALTH CARE EDUCATION/TRAINING PROGRAM

## 2023-09-05 PROCEDURE — 99204 OFFICE O/P NEW MOD 45 MIN: CPT | Mod: S$GLB,,, | Performed by: STUDENT IN AN ORGANIZED HEALTH CARE EDUCATION/TRAINING PROGRAM

## 2023-09-05 PROCEDURE — 1159F PR MEDICATION LIST DOCUMENTED IN MEDICAL RECORD: ICD-10-PCS | Mod: CPTII,S$GLB,, | Performed by: STUDENT IN AN ORGANIZED HEALTH CARE EDUCATION/TRAINING PROGRAM

## 2023-09-05 PROCEDURE — 99999 PR PBB SHADOW E&M-EST. PATIENT-LVL III: CPT | Mod: PBBFAC,,, | Performed by: STUDENT IN AN ORGANIZED HEALTH CARE EDUCATION/TRAINING PROGRAM

## 2023-09-05 PROCEDURE — 1159F MED LIST DOCD IN RCRD: CPT | Mod: CPTII,S$GLB,, | Performed by: STUDENT IN AN ORGANIZED HEALTH CARE EDUCATION/TRAINING PROGRAM

## 2023-09-05 PROCEDURE — 81003 POCT URINALYSIS(INSTRUMENT): ICD-10-PCS | Mod: QW,S$GLB,, | Performed by: STUDENT IN AN ORGANIZED HEALTH CARE EDUCATION/TRAINING PROGRAM

## 2023-09-05 PROCEDURE — 3008F PR BODY MASS INDEX (BMI) DOCUMENTED: ICD-10-PCS | Mod: CPTII,S$GLB,, | Performed by: STUDENT IN AN ORGANIZED HEALTH CARE EDUCATION/TRAINING PROGRAM

## 2023-09-05 PROCEDURE — 3044F PR MOST RECENT HEMOGLOBIN A1C LEVEL <7.0%: ICD-10-PCS | Mod: CPTII,S$GLB,, | Performed by: STUDENT IN AN ORGANIZED HEALTH CARE EDUCATION/TRAINING PROGRAM

## 2023-09-05 NOTE — PROGRESS NOTES
"[facility]   Clinic Note    Subjective:     Chief Complaint: Elevated PSA      History of Present Illness:  Frank Gay Jr. is a 68 y.o. male who presents to clinic for evaluation and management of an elevated PS. He is new to our clinic referred by Dr. Zeeshan Barron    He has had elevated PSA values which were reviewed and listed below.    PSA, Screen   Latest Ref Rng 0.00 - 4.00 ng/mL   6/11/2014 3.5    1/2/2020 5.7 (H)    7/22/2023 15.48 (H)       He has undergone a previous TRUS biopsy in 2/2013 which was incomplete.  He was unable to tolerate the complete biopsy.  Pathology reviewed and was benign but only included left base, right mid, right base. He has not had an MRI of the prostate.    He reports a positive family history of prostate cancer in his father - did not die from disease.    History of an open appendectomy.     Past medical, family, surgical and social history reviewed as documented in chart with pertinent positive medical, family, surgical and social history detailed in HPI.    A review systems was conducted with pertinent positive and negative findings documented in HPI.    Anticoagulation/Antiplatelets:  No    Objective:     Estimated body mass index is 28.96 kg/m² as calculated from the following:    Height as of this encounter: 6' 2" (1.88 m).    Weight as of this encounter: 102.3 kg (225 lb 8.5 oz).    Vital Signs (Most Recent)       Physical Exam  Constitutional:       General: He is not in acute distress.     Appearance: He is not ill-appearing or toxic-appearing.   Pulmonary:      Effort: Pulmonary effort is normal. No accessory muscle usage or respiratory distress.   Genitourinary:     Comments: Deferred to time of biopsy  Neurological:      Mental Status: He is alert.         Labs reviewed below:  Lab Results   Component Value Date    BUN 20 07/22/2023    CREATININE 0.75 07/22/2023    WBC 11.17 12/20/2019    HGB 11.4 (L) 12/20/2019    HCT 34.5 (L) 12/20/2019     " 12/20/2019    AST 14 07/22/2023    ALT 12 07/22/2023    ALKPHOS 45 12/20/2019    ALBUMIN 4.2 07/22/2023    HGBA1C 5.4 07/22/2023        PSA trend reviewed below:  Lab Results   Component Value Date    PSA 5.7 (H) 01/02/2020    PSA 3.5 06/11/2014    PSA 3.11 01/02/2013    PSA 4.24 (H) 12/06/2012    PSA 3.8 09/25/2009    PSA 2.0 06/10/2008    PSA 2.2 05/01/2006    PSADIAG 15.48 (H) 07/22/2023    PSADIAG 2.1 09/10/2013      Urine dipstick today was negative for all components.     Assessment:     1. Elevated PSA      Plan:     PSA values were discussed. PSA can be elevated in both benign and malignant conditions. We discussed the most common differential diagnosis of an elevated PSA including BPH, prostatitis (chronic and acute), and prostate cancer. The issues of sensitivity, specificity and the predictive values of PSA were discussed in detail. We discussed the benefits and limitations of PSA testing/screening, and the utility of PSA screening, specifically in identifying patients at risk for a significant prostate cancer.    Given his PSA elevation I recommend a prostate biopsy.   Repeat PSA and prostate MRI prior to biopsy    Juan Calhoun MD

## 2023-09-06 ENCOUNTER — TELEPHONE (OUTPATIENT)
Dept: UROLOGY | Facility: CLINIC | Age: 68
End: 2023-09-06
Payer: COMMERCIAL

## 2023-09-06 NOTE — TELEPHONE ENCOUNTER
----- Message from Samia Dow sent at 9/6/2023  3:33 PM CDT -----  Regarding: prior auth  Contact: Laisha spouse  Type: Needs Medical Advice  Who Called:  Lasiha spouse  Symptoms (please be specific):    How long has patient had these symptoms:    Pharmacy name and phone #:    Best Call Back Number: [phone] (home)   Additional Information: Spouse would like to know if the MRI has been approved.  They would like to have the MRI as soon as it is approved.  Please call spouse to advise.  Thanks!

## 2023-10-12 ENCOUNTER — PATIENT MESSAGE (OUTPATIENT)
Dept: UROLOGY | Facility: CLINIC | Age: 68
End: 2023-10-12
Payer: COMMERCIAL

## 2023-10-12 ENCOUNTER — TELEPHONE (OUTPATIENT)
Dept: UROLOGY | Facility: CLINIC | Age: 68
End: 2023-10-12
Payer: COMMERCIAL

## 2023-10-12 DIAGNOSIS — R97.20 ELEVATED PSA: Primary | ICD-10-CM

## 2023-10-16 ENCOUNTER — OFFICE VISIT (OUTPATIENT)
Dept: FAMILY MEDICINE | Facility: CLINIC | Age: 68
End: 2023-10-16
Payer: COMMERCIAL

## 2023-10-16 ENCOUNTER — HOSPITAL ENCOUNTER (OUTPATIENT)
Dept: RADIOLOGY | Facility: HOSPITAL | Age: 68
Discharge: HOME | End: 2023-10-16
Attending: NURSE PRACTITIONER
Payer: COMMERCIAL

## 2023-10-16 VITALS
WEIGHT: 218.13 LBS | HEART RATE: 44 BPM | BODY MASS INDEX: 27.99 KG/M2 | HEIGHT: 74 IN | SYSTOLIC BLOOD PRESSURE: 149 MMHG | DIASTOLIC BLOOD PRESSURE: 69 MMHG | OXYGEN SATURATION: 97 %

## 2023-10-16 DIAGNOSIS — Z01.818 PRE-OP EXAMINATION: ICD-10-CM

## 2023-10-16 DIAGNOSIS — Z01.818 PRE-OP EXAMINATION: Primary | ICD-10-CM

## 2023-10-16 LAB
BILIRUB UR QL STRIP: NEGATIVE
CLARITY UR: CLEAR
COLOR UR: YELLOW
GLUCOSE UR QL STRIP: NEGATIVE
HGB UR QL STRIP: NEGATIVE
KETONES UR QL STRIP: NEGATIVE
LEUKOCYTE ESTERASE UR QL STRIP: NEGATIVE
NITRITE UR QL STRIP: NEGATIVE
PH UR STRIP: 7.5 [PH] (ref 5–8)
PROT UR QL STRIP: NEGATIVE
SP GR UR STRIP: 1.01 (ref 1–1.03)
URN SPEC COLLECT METH UR: NORMAL

## 2023-10-16 PROCEDURE — 93010 ELECTROCARDIOGRAM REPORT: CPT | Mod: S$GLB,,, | Performed by: INTERNAL MEDICINE

## 2023-10-16 PROCEDURE — 3044F PR MOST RECENT HEMOGLOBIN A1C LEVEL <7.0%: ICD-10-PCS | Mod: CPTII,S$GLB,, | Performed by: NURSE PRACTITIONER

## 2023-10-16 PROCEDURE — 1160F PR REVIEW ALL MEDS BY PRESCRIBER/CLIN PHARMACIST DOCUMENTED: ICD-10-PCS | Mod: CPTII,S$GLB,, | Performed by: NURSE PRACTITIONER

## 2023-10-16 PROCEDURE — 3044F HG A1C LEVEL LT 7.0%: CPT | Mod: CPTII,S$GLB,, | Performed by: NURSE PRACTITIONER

## 2023-10-16 PROCEDURE — 1126F PR PAIN SEVERITY QUANTIFIED, NO PAIN PRESENT: ICD-10-PCS | Mod: CPTII,S$GLB,, | Performed by: NURSE PRACTITIONER

## 2023-10-16 PROCEDURE — 81003 URINALYSIS AUTO W/O SCOPE: CPT | Mod: PO | Performed by: NURSE PRACTITIONER

## 2023-10-16 PROCEDURE — 93005 EKG 12-LEAD: ICD-10-PCS | Mod: S$GLB,,, | Performed by: NURSE PRACTITIONER

## 2023-10-16 PROCEDURE — 3008F PR BODY MASS INDEX (BMI) DOCUMENTED: ICD-10-PCS | Mod: CPTII,S$GLB,, | Performed by: NURSE PRACTITIONER

## 2023-10-16 PROCEDURE — 99999 PR PBB SHADOW E&M-EST. PATIENT-LVL IV: ICD-10-PCS | Mod: PBBFAC,,, | Performed by: NURSE PRACTITIONER

## 2023-10-16 PROCEDURE — 93010 EKG 12-LEAD: ICD-10-PCS | Mod: S$GLB,,, | Performed by: INTERNAL MEDICINE

## 2023-10-16 PROCEDURE — 3077F PR MOST RECENT SYSTOLIC BLOOD PRESSURE >= 140 MM HG: ICD-10-PCS | Mod: CPTII,S$GLB,, | Performed by: NURSE PRACTITIONER

## 2023-10-16 PROCEDURE — 1159F MED LIST DOCD IN RCRD: CPT | Mod: CPTII,S$GLB,, | Performed by: NURSE PRACTITIONER

## 2023-10-16 PROCEDURE — 3078F DIAST BP <80 MM HG: CPT | Mod: CPTII,S$GLB,, | Performed by: NURSE PRACTITIONER

## 2023-10-16 PROCEDURE — 3078F PR MOST RECENT DIASTOLIC BLOOD PRESSURE < 80 MM HG: ICD-10-PCS | Mod: CPTII,S$GLB,, | Performed by: NURSE PRACTITIONER

## 2023-10-16 PROCEDURE — 71046 X-RAY EXAM CHEST 2 VIEWS: CPT | Mod: 26,,, | Performed by: RADIOLOGY

## 2023-10-16 PROCEDURE — 71046 X-RAY EXAM CHEST 2 VIEWS: CPT | Mod: TC,PO

## 2023-10-16 PROCEDURE — 1101F PT FALLS ASSESS-DOCD LE1/YR: CPT | Mod: CPTII,S$GLB,, | Performed by: NURSE PRACTITIONER

## 2023-10-16 PROCEDURE — 99999 PR PBB SHADOW E&M-EST. PATIENT-LVL IV: CPT | Mod: PBBFAC,,, | Performed by: NURSE PRACTITIONER

## 2023-10-16 PROCEDURE — 1101F PR PT FALLS ASSESS DOC 0-1 FALLS W/OUT INJ PAST YR: ICD-10-PCS | Mod: CPTII,S$GLB,, | Performed by: NURSE PRACTITIONER

## 2023-10-16 PROCEDURE — 3008F BODY MASS INDEX DOCD: CPT | Mod: CPTII,S$GLB,, | Performed by: NURSE PRACTITIONER

## 2023-10-16 PROCEDURE — 71046 XR CHEST PA AND LATERAL: ICD-10-PCS | Mod: 26,,, | Performed by: RADIOLOGY

## 2023-10-16 PROCEDURE — 3288F PR FALLS RISK ASSESSMENT DOCUMENTED: ICD-10-PCS | Mod: CPTII,S$GLB,, | Performed by: NURSE PRACTITIONER

## 2023-10-16 PROCEDURE — 99213 PR OFFICE/OUTPT VISIT, EST, LEVL III, 20-29 MIN: ICD-10-PCS | Mod: S$GLB,,, | Performed by: NURSE PRACTITIONER

## 2023-10-16 PROCEDURE — 93005 ELECTROCARDIOGRAM TRACING: CPT | Mod: S$GLB,,, | Performed by: NURSE PRACTITIONER

## 2023-10-16 PROCEDURE — 1160F RVW MEDS BY RX/DR IN RCRD: CPT | Mod: CPTII,S$GLB,, | Performed by: NURSE PRACTITIONER

## 2023-10-16 PROCEDURE — 3077F SYST BP >= 140 MM HG: CPT | Mod: CPTII,S$GLB,, | Performed by: NURSE PRACTITIONER

## 2023-10-16 PROCEDURE — 1126F AMNT PAIN NOTED NONE PRSNT: CPT | Mod: CPTII,S$GLB,, | Performed by: NURSE PRACTITIONER

## 2023-10-16 PROCEDURE — 1159F PR MEDICATION LIST DOCUMENTED IN MEDICAL RECORD: ICD-10-PCS | Mod: CPTII,S$GLB,, | Performed by: NURSE PRACTITIONER

## 2023-10-16 PROCEDURE — 3288F FALL RISK ASSESSMENT DOCD: CPT | Mod: CPTII,S$GLB,, | Performed by: NURSE PRACTITIONER

## 2023-10-16 PROCEDURE — 99213 OFFICE O/P EST LOW 20 MIN: CPT | Mod: S$GLB,,, | Performed by: NURSE PRACTITIONER

## 2023-10-16 RX ORDER — TRIAMCINOLONE ACETONIDE 1 MG/G
CREAM TOPICAL 2 TIMES DAILY
COMMUNITY
Start: 2023-10-11 | End: 2023-12-15 | Stop reason: ALTCHOICE

## 2023-10-16 NOTE — PROGRESS NOTES
Subjective     Patient ID: Frank Gay Jr. is a 68 y.o. male.    Chief Complaint: Pre-op Exam  Pt in today for pre-op for prostate biopsy. Pt denies any previous anesthesia complication. Pt has history of PE/DVT; currently taking no anticoagulants. Marked bradycardia noted today. LILIAN controlled. No pre-op paperwork provided at visit today. Pt has no other complaints today.  Past Medical History:   Diagnosis Date    Arthritis     Elevated PSA     Personal history of venous thrombosis and embolism     After trauma in the 70's, he had a DVT with a PE    Pulmonary embolism     Sleep apnea      Social History     Socioeconomic History    Marital status:    Tobacco Use    Smoking status: Never    Smokeless tobacco: Former     Quit date: 7/1/2017   Substance and Sexual Activity    Alcohol use: No    Drug use: No    Sexual activity: Yes     Partners: Female     Past Surgical History:   Procedure Laterality Date    ADENOIDECTOMY      ADENOIDECTOMY      APPENDECTOMY      ELBOW SURGERY  05/2016    EYE SURGERY      Eyelid    KNEE SURGERY      left knee    periodontal  03/2018    PROSTATE BIOPSY      TONSILLECTOMY      TOTAL KNEE ARTHROPLASTY Right 12/18/2019    Procedure: ARTHROPLASTY, KNEE, TOTAL-DEPUY-SIGMA;  Surgeon: Ken Amezcua III, MD;  Location: [facility];  Service: Orthopedics;  Laterality: Right;       HPI  Review of Systems   Constitutional: Negative.    HENT: Negative.     Eyes: Negative.    Respiratory: Negative.     Cardiovascular: Negative.    Gastrointestinal: Negative.    Endocrine: Negative.    Genitourinary: Negative.    Musculoskeletal: Negative.    Integumentary:  Negative.   Allergic/Immunologic: Negative.    Neurological: Negative.    Psychiatric/Behavioral: Negative.            Objective     Physical Exam  Vitals and nursing note reviewed.   Constitutional:       Appearance: Normal appearance.   HENT:      Head: Normocephalic.      Right Ear: Tympanic membrane, ear canal and external ear  normal.      Left Ear: Tympanic membrane, ear canal and external ear normal.      Nose: Nose normal.      Mouth/Throat:      Mouth: Mucous membranes are moist.      Pharynx: Oropharynx is clear.   Eyes:      Conjunctiva/sclera: Conjunctivae normal.      Pupils: Pupils are equal, round, and reactive to light.   Cardiovascular:      Rate and Rhythm: Normal rate and regular rhythm.      Pulses: Normal pulses.      Heart sounds: Normal heart sounds.   Pulmonary:      Effort: Pulmonary effort is normal.      Breath sounds: Normal breath sounds.   Abdominal:      General: Bowel sounds are normal.      Palpations: Abdomen is soft.   Musculoskeletal:         General: Normal range of motion.      Cervical back: Normal range of motion and neck supple.   Skin:     General: Skin is warm and dry.      Capillary Refill: Capillary refill takes 2 to 3 seconds.   Neurological:      Mental Status: He is alert and oriented to person, place, and time.   Psychiatric:         Mood and Affect: Mood normal.         Behavior: Behavior normal.         Thought Content: Thought content normal.         Judgment: Judgment normal.            Assessment and Plan     1. Pre-op examination  -     CBC Without Differential; Future; Expected date: 10/16/2023  -     Protime-INR; Future; Expected date: 10/16/2023  -     APTT; Future; Expected date: 10/16/2023  -     Comprehensive Metabolic Panel; Future; Expected date: 10/16/2023  -     Urinalysis, Reflex to Urine Culture Urine, Clean Catch  -     IN OFFICE EKG 12-LEAD (to Muse)  -     X-Ray Chest PA And Lateral; Future; Expected date: 10/16/2023        Cardiology clearance recommended               No follow-ups on file.

## 2023-10-17 ENCOUNTER — TELEPHONE (OUTPATIENT)
Dept: FAMILY MEDICINE | Facility: CLINIC | Age: 68
End: 2023-10-17
Payer: COMMERCIAL

## 2023-10-17 ENCOUNTER — TELEPHONE (OUTPATIENT)
Dept: UROLOGY | Facility: CLINIC | Age: 68
End: 2023-10-17
Payer: COMMERCIAL

## 2023-10-17 DIAGNOSIS — R00.1 BRADYCARDIA WITH 41-50 BEATS PER MINUTE: ICD-10-CM

## 2023-10-17 DIAGNOSIS — Z01.818 PRE-OP EXAMINATION: Primary | ICD-10-CM

## 2023-10-17 NOTE — TELEPHONE ENCOUNTER
----- Message from Nohemi Argueta DNP sent at 10/17/2023  2:27 PM CDT -----  EKG shows marked bradycardia. Cardiology clearance needed.

## 2023-10-19 ENCOUNTER — TELEPHONE (OUTPATIENT)
Dept: UROLOGY | Facility: CLINIC | Age: 68
End: 2023-10-19
Payer: COMMERCIAL

## 2023-10-20 ENCOUNTER — OFFICE VISIT (OUTPATIENT)
Dept: CARDIOLOGY | Facility: CLINIC | Age: 68
End: 2023-10-20
Payer: COMMERCIAL

## 2023-10-20 VITALS
SYSTOLIC BLOOD PRESSURE: 124 MMHG | HEIGHT: 74 IN | DIASTOLIC BLOOD PRESSURE: 67 MMHG | WEIGHT: 215.81 LBS | BODY MASS INDEX: 27.7 KG/M2 | HEART RATE: 41 BPM

## 2023-10-20 DIAGNOSIS — Z79.02 LONG TERM CURRENT USE OF ANTITHROMBOTICS/ANTIPLATELETS: ICD-10-CM

## 2023-10-20 DIAGNOSIS — Z01.818 PRE-OP EXAMINATION: ICD-10-CM

## 2023-10-20 DIAGNOSIS — G47.33 OBSTRUCTIVE SLEEP APNEA: Chronic | ICD-10-CM

## 2023-10-20 DIAGNOSIS — R00.1 BRADYCARDIA WITH 41-50 BEATS PER MINUTE: ICD-10-CM

## 2023-10-20 DIAGNOSIS — R97.20 ELEVATED PSA: Primary | ICD-10-CM

## 2023-10-20 DIAGNOSIS — Z86.718 PERSONAL HISTORY OF VENOUS THROMBOSIS AND EMBOLISM: ICD-10-CM

## 2023-10-20 DIAGNOSIS — Z86.718 HISTORY OF THROMBOSIS: ICD-10-CM

## 2023-10-20 PROCEDURE — 3008F PR BODY MASS INDEX (BMI) DOCUMENTED: ICD-10-PCS | Mod: CPTII,S$GLB,, | Performed by: INTERNAL MEDICINE

## 2023-10-20 PROCEDURE — 3044F PR MOST RECENT HEMOGLOBIN A1C LEVEL <7.0%: ICD-10-PCS | Mod: CPTII,S$GLB,, | Performed by: INTERNAL MEDICINE

## 2023-10-20 PROCEDURE — 3008F BODY MASS INDEX DOCD: CPT | Mod: CPTII,S$GLB,, | Performed by: INTERNAL MEDICINE

## 2023-10-20 PROCEDURE — 1126F PR PAIN SEVERITY QUANTIFIED, NO PAIN PRESENT: ICD-10-PCS | Mod: CPTII,S$GLB,, | Performed by: INTERNAL MEDICINE

## 2023-10-20 PROCEDURE — 1101F PT FALLS ASSESS-DOCD LE1/YR: CPT | Mod: CPTII,S$GLB,, | Performed by: INTERNAL MEDICINE

## 2023-10-20 PROCEDURE — 99204 OFFICE O/P NEW MOD 45 MIN: CPT | Mod: S$GLB,,, | Performed by: INTERNAL MEDICINE

## 2023-10-20 PROCEDURE — 3288F FALL RISK ASSESSMENT DOCD: CPT | Mod: CPTII,S$GLB,, | Performed by: INTERNAL MEDICINE

## 2023-10-20 PROCEDURE — 3078F PR MOST RECENT DIASTOLIC BLOOD PRESSURE < 80 MM HG: ICD-10-PCS | Mod: CPTII,S$GLB,, | Performed by: INTERNAL MEDICINE

## 2023-10-20 PROCEDURE — 3288F PR FALLS RISK ASSESSMENT DOCUMENTED: ICD-10-PCS | Mod: CPTII,S$GLB,, | Performed by: INTERNAL MEDICINE

## 2023-10-20 PROCEDURE — 3074F SYST BP LT 130 MM HG: CPT | Mod: CPTII,S$GLB,, | Performed by: INTERNAL MEDICINE

## 2023-10-20 PROCEDURE — 1160F PR REVIEW ALL MEDS BY PRESCRIBER/CLIN PHARMACIST DOCUMENTED: ICD-10-PCS | Mod: CPTII,S$GLB,, | Performed by: INTERNAL MEDICINE

## 2023-10-20 PROCEDURE — 99999 PR PBB SHADOW E&M-EST. PATIENT-LVL III: CPT | Mod: PBBFAC,,, | Performed by: INTERNAL MEDICINE

## 2023-10-20 PROCEDURE — 99204 PR OFFICE/OUTPT VISIT, NEW, LEVL IV, 45-59 MIN: ICD-10-PCS | Mod: S$GLB,,, | Performed by: INTERNAL MEDICINE

## 2023-10-20 PROCEDURE — 3078F DIAST BP <80 MM HG: CPT | Mod: CPTII,S$GLB,, | Performed by: INTERNAL MEDICINE

## 2023-10-20 PROCEDURE — 3044F HG A1C LEVEL LT 7.0%: CPT | Mod: CPTII,S$GLB,, | Performed by: INTERNAL MEDICINE

## 2023-10-20 PROCEDURE — 93010 EKG 12-LEAD: ICD-10-PCS | Mod: S$GLB,,, | Performed by: INTERNAL MEDICINE

## 2023-10-20 PROCEDURE — 93005 ELECTROCARDIOGRAM TRACING: CPT | Mod: PO

## 2023-10-20 PROCEDURE — 1160F RVW MEDS BY RX/DR IN RCRD: CPT | Mod: CPTII,S$GLB,, | Performed by: INTERNAL MEDICINE

## 2023-10-20 PROCEDURE — 1101F PR PT FALLS ASSESS DOC 0-1 FALLS W/OUT INJ PAST YR: ICD-10-PCS | Mod: CPTII,S$GLB,, | Performed by: INTERNAL MEDICINE

## 2023-10-20 PROCEDURE — 99999 PR PBB SHADOW E&M-EST. PATIENT-LVL III: ICD-10-PCS | Mod: PBBFAC,,, | Performed by: INTERNAL MEDICINE

## 2023-10-20 PROCEDURE — 1159F MED LIST DOCD IN RCRD: CPT | Mod: CPTII,S$GLB,, | Performed by: INTERNAL MEDICINE

## 2023-10-20 PROCEDURE — 1126F AMNT PAIN NOTED NONE PRSNT: CPT | Mod: CPTII,S$GLB,, | Performed by: INTERNAL MEDICINE

## 2023-10-20 PROCEDURE — 3074F PR MOST RECENT SYSTOLIC BLOOD PRESSURE < 130 MM HG: ICD-10-PCS | Mod: CPTII,S$GLB,, | Performed by: INTERNAL MEDICINE

## 2023-10-20 PROCEDURE — 1159F PR MEDICATION LIST DOCUMENTED IN MEDICAL RECORD: ICD-10-PCS | Mod: CPTII,S$GLB,, | Performed by: INTERNAL MEDICINE

## 2023-10-20 PROCEDURE — 93010 ELECTROCARDIOGRAM REPORT: CPT | Mod: S$GLB,,, | Performed by: INTERNAL MEDICINE

## 2023-10-20 NOTE — PROGRESS NOTES
Subjective:    Patient ID:  Frank Gay Jr. is a 68 y.o. male patient here for evaluation Hyperlipidemia      History of Present Illness:  New patient cardiac evaluation preop clearance for prostate biopsy.  Patient referred for abnormal EKG, sinus bradycardia.  By history , sinus bradycardia documented in the past.  No syncope/presyncope.  No dyspnea.  Active at home without complaints.  No prior history of ischemic structural arrhythmic heart disease.    No prior history of diabetes mellitus, hypertension dyslipidemia.    Remote history of trauma related to a tractor accident resulting fracture with complicating DVT PE.  This happened a number of years ago non recurrent.  No history of past documented coagulopathy.             Review of patient's allergies indicates:   Allergen Reactions    Celebrex  [celecoxib] Blisters, Dermatitis, Edema, Hallucinations, Hives, Itching and Swelling    Dairy aid  [lactase]      Other reaction(s): stomach cramps  Other reaction(s): Itching  Other reaction(s): Diarrhea    Eliquis  [apixaban] Blisters, Edema, Hallucinations, Other (See Comments) and Swelling    Influenza a (h1n1) vac 09 (pf)     Lactose        Past Medical History:   Diagnosis Date    Arthritis     Elevated PSA     Personal history of venous thrombosis and embolism     After trauma in the 70's, he had a DVT with a PE    Pulmonary embolism     Sleep apnea      Past Surgical History:   Procedure Laterality Date    ADENOIDECTOMY      ADENOIDECTOMY      APPENDECTOMY      ELBOW SURGERY  05/2016    EYE SURGERY      Eyelid    KNEE SURGERY      left knee    periodontal  03/2018    PROSTATE BIOPSY      TONSILLECTOMY      TOTAL KNEE ARTHROPLASTY Right 12/18/2019    Procedure: ARTHROPLASTY, KNEE, TOTAL-DEPUY-SIGMA;  Surgeon: Ken Amezcua III, MD;  Location: [facility];  Service: Orthopedics;  Laterality: Right;     Social History     Tobacco Use    Smoking status: Never    Smokeless tobacco: Former     Quit date: 7/1/2017    Substance Use Topics    Alcohol use: No    Drug use: No        Review of Systems:    As noted in HPI in addition         REVIEW OF SYSTEMS  Review of Systems   Constitutional: Negative for decreased appetite, diaphoresis, night sweats, weight gain and weight loss.   HENT:  Negative for nosebleeds and odynophagia.    Eyes:  Negative for double vision and photophobia.   Cardiovascular:  Negative for chest pain, claudication, cyanosis, dyspnea on exertion, irregular heartbeat, leg swelling, near-syncope, orthopnea, palpitations, paroxysmal nocturnal dyspnea and syncope.   Respiratory:  Negative for cough, hemoptysis, shortness of breath and wheezing.    Hematologic/Lymphatic: Negative for adenopathy.   Skin:  Negative for flushing, skin cancer and suspicious lesions.   Musculoskeletal:  Negative for gout, myalgias and neck pain.   Gastrointestinal:  Negative for abdominal pain, heartburn, hematemesis and hematochezia.   Genitourinary:  Negative for bladder incontinence, hesitancy and nocturia.   Neurological:  Negative for focal weakness, headaches, light-headedness and paresthesias.   Psychiatric/Behavioral:  Negative for memory loss and substance abuse.        Objective:        Vitals:    10/20/23 0831   BP: 124/67   Pulse: (!) 41       Lab Results   Component Value Date    WBC 6.50 10/16/2023    HGB 13.4 (L) 10/16/2023    HCT 41.7 10/16/2023     10/16/2023    CHOL 188 07/22/2023    TRIG 78 07/22/2023    HDL 50 07/22/2023    ALT 16 10/16/2023    AST 19 10/16/2023     10/16/2023    K 4.3 10/16/2023     10/16/2023    CREATININE 0.8 10/16/2023    BUN 17 10/16/2023    CO2 28 10/16/2023    TSH 1.10 06/08/2009    PSA 5.7 (H) 01/02/2020    INR 1.0 10/16/2023    HGBA1C 5.4 07/22/2023      CARDIOGRAM RESULTS  No results found for this or any previous visit.        CURRENT/PREVIOUS VISIT EKG  Results for orders placed or performed in visit on 10/16/23   IN OFFICE EKG 12-LEAD (to [place])    Collection Time:  10/16/23  9:38 AM    Narrative    Test Reason : JOANNE,    Vent. Rate : 042 BPM     Atrial Rate : 042 BPM     P-R Int : 170 ms          QRS Dur : 088 ms      QT Int : 476 ms       P-R-T Axes : 030 -05 013 degrees     QTc Int : 397 ms    Marked sinus bradycardia  Abnormal ECG  When compared with ECG of 13-DEC-2019 15:13,  Nonspecific T wave abnormality no longer evident in Lateral leads  Confirmed by Shadi Aceves MD ([id number]) on 10/16/2023 7:57:20 PM    Referred By: ZORAIDA LEBLANC           Confirmed By:Shadi Aceves MD     No valid procedures specified.   No results found for this or any previous visit.    No valid procedures specified.    PHYSICAL EXAM  CONSTITUTIONAL: Well built, well nourished in no apparent distress  NECK: no carotid bruit, no JVD  LUNGS: CTA  CHEST WALL: no tenderness,  HEART: regular rate and rhythm, S1, S2 normal, no murmur, click, rub or gallop   ABDOMEN: soft, non-tender; bowel sounds normal; no masses,  no organomegaly  EXTREMITIES: Extremities normal, no edema, no calf tenderness noted  VASCULAR EXAM:  Absent right radial artery pulse AND 2+ LOWER EXT PULSES  NEURO: AAO X 3, NO ACUTE FOCAL OR LATERALIZING FINDINGS    I HAVE REVIEWED :    The vital signs, nurses notes, and all the pertinent radiology and labs.         Current Outpatient Medications   Medication Instructions    colchicine (COLCRYS) 0.6 mg tablet Take 1 tablet PO BID for 3-5 days until symptoms resolve    triamcinolone acetonide 0.1% (KENALOG) 0.1 % cream Topical (Top), 2 times daily          Assessment:   Sinus bradycardia, asymptomatic.  Chronic by history.  Preop clearance for prostate biopsy.  No past documented ischemic structural arrhythmic heart disease.    Remote history of possible underlying sleep apnea, intolerant CPAP, better with ENT surgery.    Plan:   Low risk cleared for urologic procedure.  Suggest cardiac echo, 24 hour Holter monitor.          No follow-ups on file.

## 2023-10-31 ENCOUNTER — CLINICAL SUPPORT (OUTPATIENT)
Dept: CARDIOLOGY | Facility: HOSPITAL | Age: 68
End: 2023-10-31
Attending: INTERNAL MEDICINE
Payer: COMMERCIAL

## 2023-10-31 VITALS
HEIGHT: 72 IN | BODY MASS INDEX: 29.12 KG/M2 | DIASTOLIC BLOOD PRESSURE: 59 MMHG | SYSTOLIC BLOOD PRESSURE: 113 MMHG | WEIGHT: 215 LBS

## 2023-10-31 DIAGNOSIS — R00.1 BRADYCARDIA WITH 41-50 BEATS PER MINUTE: ICD-10-CM

## 2023-10-31 DIAGNOSIS — Z86.718 HISTORY OF THROMBOSIS: ICD-10-CM

## 2023-10-31 DIAGNOSIS — Z79.02 LONG TERM CURRENT USE OF ANTITHROMBOTICS/ANTIPLATELETS: ICD-10-CM

## 2023-10-31 DIAGNOSIS — Z86.718 PERSONAL HISTORY OF VENOUS THROMBOSIS AND EMBOLISM: ICD-10-CM

## 2023-10-31 DIAGNOSIS — G47.33 OBSTRUCTIVE SLEEP APNEA: Chronic | ICD-10-CM

## 2023-10-31 DIAGNOSIS — R97.20 ELEVATED PSA: ICD-10-CM

## 2023-10-31 DIAGNOSIS — Z01.818 PRE-OP EXAMINATION: ICD-10-CM

## 2023-10-31 LAB
ASCENDING AORTA: 3.29 CM
AV INDEX (PROSTH): 0.7
AV MEAN GRADIENT: 5 MMHG
AV PEAK GRADIENT: 8 MMHG
AV REGURGITATION PRESSURE HALF TIME: 1234.86 MS
AV VALVE AREA BY VELOCITY RATIO: 2.99 CM²
AV VALVE AREA: 2.9 CM²
AV VELOCITY RATIO: 0.72
BSA FOR ECHO PROCEDURE: 2.23 M2
CV ECHO LV RWT: 0.35 CM
DOP CALC AO PEAK VEL: 1.43 M/S
DOP CALC AO VTI: 37.6 CM
DOP CALC LVOT AREA: 4.2 CM2
DOP CALC LVOT DIAMETER: 2.3 CM
DOP CALC LVOT PEAK VEL: 1.03 M/S
DOP CALC LVOT STROKE VOLUME: 109.21 CM3
DOP CALCLVOT PEAK VEL VTI: 26.3 CM
E WAVE DECELERATION TIME: 262.94 MSEC
E/A RATIO: 0.87
E/E' RATIO: 7.2 M/S
ECHO LV POSTERIOR WALL: 0.98 CM (ref 0.6–1.1)
FRACTIONAL SHORTENING: 31 % (ref 28–44)
INTERVENTRICULAR SEPTUM: 1.05 CM (ref 0.6–1.1)
IVRT: 123.69 MSEC
LEFT ATRIUM SIZE: 4.41 CM
LEFT ATRIUM VOLUME INDEX MOD: 44.3 ML/M2
LEFT ATRIUM VOLUME MOD: 97.39 CM3
LEFT INTERNAL DIMENSION IN SYSTOLE: 3.81 CM (ref 2.1–4)
LEFT VENTRICLE DIASTOLIC VOLUME INDEX: 68.28 ML/M2
LEFT VENTRICLE DIASTOLIC VOLUME: 150.22 ML
LEFT VENTRICLE MASS INDEX: 100 G/M2
LEFT VENTRICLE SYSTOLIC VOLUME INDEX: 28.3 ML/M2
LEFT VENTRICLE SYSTOLIC VOLUME: 62.18 ML
LEFT VENTRICULAR INTERNAL DIMENSION IN DIASTOLE: 5.55 CM (ref 3.5–6)
LEFT VENTRICULAR MASS: 220.72 G
LV LATERAL E/E' RATIO: 6 M/S
LV SEPTAL E/E' RATIO: 9 M/S
LVOT MG: 1.75 MMHG
LVOT MV: 0.59 CM/S
MV PEAK A VEL: 0.62 M/S
MV PEAK E VEL: 0.54 M/S
MV STENOSIS PRESSURE HALF TIME: 76.25 MS
MV VALVE AREA P 1/2 METHOD: 2.89 CM2
PISA AR MAX VEL: 4.18 M/S
PISA MRMAX VEL: 5.66 M/S
PISA TR MAX VEL: 2.6 M/S
PULM VEIN S/D RATIO: 1
PV PEAK D VEL: 0.56 M/S
PV PEAK S VEL: 0.56 M/S
RA PRESSURE ESTIMATED: 3 MMHG
RIGHT VENTRICULAR END-DIASTOLIC DIMENSION: 4.59 CM
RIGHT VENTRICULAR LENGTH IN DIASTOLE (APICAL 4-CHAMBER VIEW): 8.32 CM
RV MID DIAMA: 3.23 CM
RV TB RVSP: 6 MMHG
RV TISSUE DOPPLER FREE WALL SYSTOLIC VELOCITY 1 (APICAL 4 CHAMBER VIEW): 15.82 CM/S
SINUS: 3.49 CM
STJ: 3.15 CM
TDI LATERAL: 0.09 M/S
TDI SEPTAL: 0.06 M/S
TDI: 0.08 M/S
TR MAX PG: 27 MMHG
TRICUSPID ANNULAR PLANE SYSTOLIC EXCURSION: 2.6 CM
TV REST PULMONARY ARTERY PRESSURE: 30 MMHG
Z-SCORE OF LEFT VENTRICULAR DIMENSION IN END DIASTOLE: -3.03
Z-SCORE OF LEFT VENTRICULAR DIMENSION IN END SYSTOLE: -1.43

## 2023-10-31 PROCEDURE — 93226 XTRNL ECG REC<48 HR SCAN A/R: CPT | Mod: PO

## 2023-10-31 PROCEDURE — 93227 HOLTER MONITOR - 24 HOUR (CUPID ONLY): ICD-10-PCS | Mod: ,,, | Performed by: INTERNAL MEDICINE

## 2023-10-31 PROCEDURE — 93227 XTRNL ECG REC<48 HR R&I: CPT | Mod: ,,, | Performed by: INTERNAL MEDICINE

## 2023-10-31 PROCEDURE — 93306 TTE W/DOPPLER COMPLETE: CPT | Mod: PO

## 2023-10-31 PROCEDURE — 93306 TTE W/DOPPLER COMPLETE: CPT | Mod: 26,,, | Performed by: INTERNAL MEDICINE

## 2023-10-31 PROCEDURE — 93306 ECHO (CUPID ONLY): ICD-10-PCS | Mod: 26,,, | Performed by: INTERNAL MEDICINE

## 2023-11-03 LAB
OHS CV EVENT MONITOR DAY: 0
OHS CV HOLTER LENGTH DECIMAL HOURS: 24
OHS CV HOLTER LENGTH HOURS: 24
OHS CV HOLTER LENGTH MINUTES: 0
OHS CV HOLTER SINUS AVERAGE HR: 52
OHS CV HOLTER SINUS MAX HR: 102
OHS CV HOLTER SINUS MIN HR: 37

## 2023-11-08 ENCOUNTER — OFFICE VISIT (OUTPATIENT)
Dept: ORTHOPEDICS | Facility: CLINIC | Age: 68
End: 2023-11-08
Payer: COMMERCIAL

## 2023-11-08 ENCOUNTER — TELEPHONE (OUTPATIENT)
Dept: ORTHOPEDICS | Facility: CLINIC | Age: 68
End: 2023-11-08
Payer: COMMERCIAL

## 2023-11-08 ENCOUNTER — OFFICE VISIT (OUTPATIENT)
Dept: FAMILY MEDICINE | Facility: CLINIC | Age: 68
End: 2023-11-08
Payer: COMMERCIAL

## 2023-11-08 ENCOUNTER — HOSPITAL ENCOUNTER (OUTPATIENT)
Dept: RADIOLOGY | Facility: HOSPITAL | Age: 68
Discharge: HOME | End: 2023-11-08
Attending: NURSE PRACTITIONER
Payer: COMMERCIAL

## 2023-11-08 VITALS
DIASTOLIC BLOOD PRESSURE: 70 MMHG | HEART RATE: 50 BPM | WEIGHT: 215 LBS | SYSTOLIC BLOOD PRESSURE: 110 MMHG | BODY MASS INDEX: 29.16 KG/M2 | OXYGEN SATURATION: 100 % | TEMPERATURE: 97 F

## 2023-11-08 DIAGNOSIS — M25.421 SWELLING OF RIGHT ELBOW: ICD-10-CM

## 2023-11-08 DIAGNOSIS — S59.901D INJURY OF RIGHT ELBOW, SUBSEQUENT ENCOUNTER: ICD-10-CM

## 2023-11-08 DIAGNOSIS — M25.429 ELBOW SWELLING, UNSPECIFIED LATERALITY: ICD-10-CM

## 2023-11-08 DIAGNOSIS — M25.521 RIGHT ELBOW PAIN: Primary | ICD-10-CM

## 2023-11-08 DIAGNOSIS — M25.521 RIGHT ELBOW PAIN: ICD-10-CM

## 2023-11-08 DIAGNOSIS — M19.021 PRIMARY OSTEOARTHRITIS OF RIGHT ELBOW: Primary | ICD-10-CM

## 2023-11-08 DIAGNOSIS — M19.90 OSTEOARTHRITIS, UNSPECIFIED OSTEOARTHRITIS TYPE, UNSPECIFIED SITE: Primary | ICD-10-CM

## 2023-11-08 PROCEDURE — 1159F PR MEDICATION LIST DOCUMENTED IN MEDICAL RECORD: ICD-10-PCS | Mod: CPTII,S$GLB,, | Performed by: PHYSICIAN ASSISTANT

## 2023-11-08 PROCEDURE — 3044F HG A1C LEVEL LT 7.0%: CPT | Mod: CPTII,S$GLB,, | Performed by: NURSE PRACTITIONER

## 2023-11-08 PROCEDURE — 3008F PR BODY MASS INDEX (BMI) DOCUMENTED: ICD-10-PCS | Mod: CPTII,S$GLB,, | Performed by: NURSE PRACTITIONER

## 2023-11-08 PROCEDURE — 1101F PT FALLS ASSESS-DOCD LE1/YR: CPT | Mod: CPTII,S$GLB,, | Performed by: PHYSICIAN ASSISTANT

## 2023-11-08 PROCEDURE — 1125F PR PAIN SEVERITY QUANTIFIED, PAIN PRESENT: ICD-10-PCS | Mod: CPTII,S$GLB,, | Performed by: NURSE PRACTITIONER

## 2023-11-08 PROCEDURE — 3078F PR MOST RECENT DIASTOLIC BLOOD PRESSURE < 80 MM HG: ICD-10-PCS | Mod: CPTII,S$GLB,, | Performed by: NURSE PRACTITIONER

## 2023-11-08 PROCEDURE — 3008F BODY MASS INDEX DOCD: CPT | Mod: CPTII,S$GLB,, | Performed by: NURSE PRACTITIONER

## 2023-11-08 PROCEDURE — 73080 X-RAY EXAM OF ELBOW: CPT | Mod: 26,RT,, | Performed by: RADIOLOGY

## 2023-11-08 PROCEDURE — 1125F PR PAIN SEVERITY QUANTIFIED, PAIN PRESENT: ICD-10-PCS | Mod: CPTII,S$GLB,, | Performed by: PHYSICIAN ASSISTANT

## 2023-11-08 PROCEDURE — 1159F PR MEDICATION LIST DOCUMENTED IN MEDICAL RECORD: ICD-10-PCS | Mod: CPTII,S$GLB,, | Performed by: NURSE PRACTITIONER

## 2023-11-08 PROCEDURE — 99999 PR PBB SHADOW E&M-EST. PATIENT-LVL III: ICD-10-PCS | Mod: PBBFAC,,, | Performed by: PHYSICIAN ASSISTANT

## 2023-11-08 PROCEDURE — 3288F FALL RISK ASSESSMENT DOCD: CPT | Mod: CPTII,S$GLB,, | Performed by: PHYSICIAN ASSISTANT

## 2023-11-08 PROCEDURE — 1101F PR PT FALLS ASSESS DOC 0-1 FALLS W/OUT INJ PAST YR: ICD-10-PCS | Mod: CPTII,S$GLB,, | Performed by: NURSE PRACTITIONER

## 2023-11-08 PROCEDURE — 1101F PT FALLS ASSESS-DOCD LE1/YR: CPT | Mod: CPTII,S$GLB,, | Performed by: NURSE PRACTITIONER

## 2023-11-08 PROCEDURE — 1160F PR REVIEW ALL MEDS BY PRESCRIBER/CLIN PHARMACIST DOCUMENTED: ICD-10-PCS | Mod: CPTII,S$GLB,, | Performed by: PHYSICIAN ASSISTANT

## 2023-11-08 PROCEDURE — 3074F PR MOST RECENT SYSTOLIC BLOOD PRESSURE < 130 MM HG: ICD-10-PCS | Mod: CPTII,S$GLB,, | Performed by: NURSE PRACTITIONER

## 2023-11-08 PROCEDURE — 3074F SYST BP LT 130 MM HG: CPT | Mod: CPTII,S$GLB,, | Performed by: NURSE PRACTITIONER

## 2023-11-08 PROCEDURE — 99999 PR PBB SHADOW E&M-EST. PATIENT-LVL V: CPT | Mod: PBBFAC,,, | Performed by: NURSE PRACTITIONER

## 2023-11-08 PROCEDURE — 3044F PR MOST RECENT HEMOGLOBIN A1C LEVEL <7.0%: ICD-10-PCS | Mod: CPTII,S$GLB,, | Performed by: PHYSICIAN ASSISTANT

## 2023-11-08 PROCEDURE — 1159F MED LIST DOCD IN RCRD: CPT | Mod: CPTII,S$GLB,, | Performed by: NURSE PRACTITIONER

## 2023-11-08 PROCEDURE — 1101F PR PT FALLS ASSESS DOC 0-1 FALLS W/OUT INJ PAST YR: ICD-10-PCS | Mod: CPTII,S$GLB,, | Performed by: PHYSICIAN ASSISTANT

## 2023-11-08 PROCEDURE — 1159F MED LIST DOCD IN RCRD: CPT | Mod: CPTII,S$GLB,, | Performed by: PHYSICIAN ASSISTANT

## 2023-11-08 PROCEDURE — 3288F PR FALLS RISK ASSESSMENT DOCUMENTED: ICD-10-PCS | Mod: CPTII,S$GLB,, | Performed by: PHYSICIAN ASSISTANT

## 2023-11-08 PROCEDURE — 99214 PR OFFICE/OUTPT VISIT, EST, LEVL IV, 30-39 MIN: ICD-10-PCS | Mod: S$GLB,,, | Performed by: PHYSICIAN ASSISTANT

## 2023-11-08 PROCEDURE — 99999 PR PBB SHADOW E&M-EST. PATIENT-LVL V: ICD-10-PCS | Mod: PBBFAC,,, | Performed by: NURSE PRACTITIONER

## 2023-11-08 PROCEDURE — 3078F DIAST BP <80 MM HG: CPT | Mod: CPTII,S$GLB,, | Performed by: NURSE PRACTITIONER

## 2023-11-08 PROCEDURE — 99213 OFFICE O/P EST LOW 20 MIN: CPT | Mod: S$GLB,,, | Performed by: NURSE PRACTITIONER

## 2023-11-08 PROCEDURE — 1160F RVW MEDS BY RX/DR IN RCRD: CPT | Mod: CPTII,S$GLB,, | Performed by: PHYSICIAN ASSISTANT

## 2023-11-08 PROCEDURE — 1160F PR REVIEW ALL MEDS BY PRESCRIBER/CLIN PHARMACIST DOCUMENTED: ICD-10-PCS | Mod: CPTII,S$GLB,, | Performed by: NURSE PRACTITIONER

## 2023-11-08 PROCEDURE — 99999 PR PBB SHADOW E&M-EST. PATIENT-LVL III: CPT | Mod: PBBFAC,,, | Performed by: PHYSICIAN ASSISTANT

## 2023-11-08 PROCEDURE — 1125F AMNT PAIN NOTED PAIN PRSNT: CPT | Mod: CPTII,S$GLB,, | Performed by: NURSE PRACTITIONER

## 2023-11-08 PROCEDURE — 1160F RVW MEDS BY RX/DR IN RCRD: CPT | Mod: CPTII,S$GLB,, | Performed by: NURSE PRACTITIONER

## 2023-11-08 PROCEDURE — 3288F FALL RISK ASSESSMENT DOCD: CPT | Mod: CPTII,S$GLB,, | Performed by: NURSE PRACTITIONER

## 2023-11-08 PROCEDURE — 3044F PR MOST RECENT HEMOGLOBIN A1C LEVEL <7.0%: ICD-10-PCS | Mod: CPTII,S$GLB,, | Performed by: NURSE PRACTITIONER

## 2023-11-08 PROCEDURE — 3044F HG A1C LEVEL LT 7.0%: CPT | Mod: CPTII,S$GLB,, | Performed by: PHYSICIAN ASSISTANT

## 2023-11-08 PROCEDURE — 73080 XR ELBOW COMPLETE 3 VIEW RIGHT: ICD-10-PCS | Mod: 26,RT,, | Performed by: RADIOLOGY

## 2023-11-08 PROCEDURE — 73080 X-RAY EXAM OF ELBOW: CPT | Mod: TC,PO,RT

## 2023-11-08 PROCEDURE — 1125F AMNT PAIN NOTED PAIN PRSNT: CPT | Mod: CPTII,S$GLB,, | Performed by: PHYSICIAN ASSISTANT

## 2023-11-08 PROCEDURE — 99213 PR OFFICE/OUTPT VISIT, EST, LEVL III, 20-29 MIN: ICD-10-PCS | Mod: S$GLB,,, | Performed by: NURSE PRACTITIONER

## 2023-11-08 PROCEDURE — 99214 OFFICE O/P EST MOD 30 MIN: CPT | Mod: S$GLB,,, | Performed by: PHYSICIAN ASSISTANT

## 2023-11-08 PROCEDURE — 3288F PR FALLS RISK ASSESSMENT DOCUMENTED: ICD-10-PCS | Mod: CPTII,S$GLB,, | Performed by: NURSE PRACTITIONER

## 2023-11-08 RX ORDER — LIDOCAINE AND TETRACAINE 70; 70 MG/G; MG/G
CREAM TOPICAL
Qty: 30 G | Refills: 0 | Status: SHIPPED | OUTPATIENT
Start: 2023-11-08

## 2023-11-08 RX ORDER — METHYLPREDNISOLONE 4 MG/1
TABLET ORAL
Qty: 21 EACH | Refills: 0 | Status: SHIPPED | OUTPATIENT
Start: 2023-11-08 | End: 2023-11-22 | Stop reason: ALTCHOICE

## 2023-11-08 RX ORDER — TRAMADOL HYDROCHLORIDE 50 MG/1
50 TABLET ORAL EVERY 8 HOURS PRN
Qty: 15 TABLET | Refills: 0 | Status: SHIPPED | OUTPATIENT
Start: 2023-11-08 | End: 2023-11-13

## 2023-11-08 NOTE — TELEPHONE ENCOUNTER
----- Message from Isela Singletary sent at 11/8/2023  9:33 AM CST -----  Regarding: Pt's Wife Mrs Gay called to request a work in appt for elbow pain that the pt has surgery on and pt would like a call back this morning  Appointment Access Request:    Pt's Wife Mrs Gay called to request a work in appt for elbow pain that the pt has surgery on and pt would like a call back this morning    Pt can be reached at [phone]

## 2023-11-08 NOTE — PROGRESS NOTES
Subjective     Patient ID: Frank Gay Jr. is a 68 y.o. male.    Chief Complaint: Elbow Injury    Elbow Injury  This is a new (right elbow; states pain began after doing plumbing on yesterday; states no known injury) problem. The current episode started yesterday. The problem occurs daily. The problem has been unchanged. Associated symptoms include arthralgias (right elbow) and joint swelling (right elbow). Pertinent negatives include no abdominal pain, anorexia, change in bowel habit, chest pain, chills, congestion, coughing, diaphoresis, fatigue, fever, headaches, myalgias, nausea, neck pain, numbness, rash, sore throat, swollen glands, urinary symptoms, vertigo, visual change, vomiting or weakness. Nothing aggravates the symptoms. He has tried nothing (Pt states history of ulnar nerve surgery; states sees orthopedics, however no appts available today; requesting refill of lidocaine compound cream) for the symptoms. The treatment provided no relief.     Past Medical History:   Diagnosis Date    Arthritis     Cancer     PROSTATE    Elevated PSA     Gout, unspecified     Personal history of venous thrombosis and embolism     After trauma in the 70's, he had a DVT with a PE    Pulmonary embolism     Sleep apnea     RESOLVED WITH UPPP AND SEPTOPLASTY/ NO MACHINE     Social History     Socioeconomic History    Marital status:    Tobacco Use    Smoking status: Never    Smokeless tobacco: Former     Quit date: 7/1/2017   Substance and Sexual Activity    Alcohol use: No    Drug use: No    Sexual activity: Yes     Partners: Female     Past Surgical History:   Procedure Laterality Date    ADENOIDECTOMY      APPENDECTOMY      ELBOW SURGERY  05/2016    EYE SURGERY      Eyelid    JOINT REPLACEMENT Left 2013    KNEE    KNEE SURGERY      left knee    periodontal  03/2018    PROSTATE BIOPSY  2018    TONSILLECTOMY      TOTAL KNEE ARTHROPLASTY Right 12/18/2019    Procedure: ARTHROPLASTY, KNEE, TOTAL-DEPUY-SIGMA;   Surgeon: Ken Amezcua III, MD;  Location: [facility];  Service: Orthopedics;  Laterality: Right;    UVULOPALATOPHARYNGOPLASTY      AND SEPTOPLASTY       Review of Systems   Constitutional: Negative.  Negative for chills, diaphoresis, fatigue and fever.   HENT: Negative.  Negative for nasal congestion and sore throat.    Eyes: Negative.    Respiratory: Negative.  Negative for cough.    Cardiovascular: Negative.  Negative for chest pain.   Gastrointestinal: Negative.  Negative for abdominal pain, anorexia, change in bowel habit, nausea and vomiting.   Endocrine: Negative.    Genitourinary: Negative.    Musculoskeletal:  Positive for arthralgias (right elbow) and joint swelling (right elbow). Negative for myalgias and neck pain.   Integumentary:  Negative for rash. Negative.   Allergic/Immunologic: Negative.    Neurological: Negative.  Negative for vertigo, weakness, numbness and headaches.   Psychiatric/Behavioral: Negative.            Objective     Physical Exam  Vitals and nursing note reviewed.   Constitutional:       Appearance: Normal appearance.   HENT:      Head: Normocephalic.      Right Ear: Tympanic membrane, ear canal and external ear normal.      Left Ear: Tympanic membrane, ear canal and external ear normal.      Nose: Nose normal.      Mouth/Throat:      Mouth: Mucous membranes are moist.      Pharynx: Oropharynx is clear.   Eyes:      Conjunctiva/sclera: Conjunctivae normal.      Pupils: Pupils are equal, round, and reactive to light.   Cardiovascular:      Rate and Rhythm: Normal rate and regular rhythm.      Pulses: Normal pulses.      Heart sounds: Normal heart sounds.   Pulmonary:      Effort: Pulmonary effort is normal.      Breath sounds: Normal breath sounds.   Abdominal:      General: Bowel sounds are normal.      Palpations: Abdomen is soft.   Musculoskeletal:      Right elbow: Swelling present. No deformity, effusion or lacerations. Decreased range of motion. No tenderness.      Cervical  back: Normal range of motion and neck supple.   Skin:     General: Skin is warm and dry.      Capillary Refill: Capillary refill takes 2 to 3 seconds.   Neurological:      Mental Status: He is alert and oriented to person, place, and time.   Psychiatric:         Mood and Affect: Mood normal.         Behavior: Behavior normal.         Thought Content: Thought content normal.         Judgment: Judgment normal.            Assessment and Plan     1. Right elbow pain  2. Elbow swelling, unspecified laterality  3. Injury of right elbow, subsequent encounter  -     X-Ray Elbow Complete Right; Future; Expected date: 11/08/2023  -     Ambulatory referral/consult to Orthopedics; Future; Expected date: 11/15/2023         -     LIDOcaine-TETRAcaine 7-7 % Crea; APPLY 2 GRAMS TO THE AFFECTED AREAS 3-4 TIMES DAILY,  Dispense: 30 g; Refill: 0  Ultram request sent to on call MD to approve  Avoid lifting, strenuous activity  F/U with orthopedics  Report to ER immediately if symptoms worsen or persist               No follow-ups on file.

## 2023-11-08 NOTE — PATIENT INSTRUCTIONS
Ultram request sent to on call MD to approve  Avoid lifting, strenuous activity  F/U with orthopedics  Report to ER immediately if symptoms worsen or persist      Pavan Marie,     If you are due for any health screening(s) below please notify me so we can arrange them to be ordered and scheduled. Most healthy patients at your age complete them, but you are free to accept or refuse.     If you can't do it, I'll definitely understand. If you can, I'd certainly appreciate it!    Tests to Keep You Healthy    Colon Cancer Screening: ORDERED      Its time for your colon cancer screening     Colorectal cancer is one of the leading causes of cancer death for men and women but it doesnt have to be. Screenings can prevent colorectal cancer or find it early enough to treat and cure the disease.     Our records indicate that you may be overdue for colon cancer screening. A colonoscopy or stool screening test can help identify patients at risk for developing colon cancer. Cancer screenings save lives, so schedule yours today to stay healthy.     A colonoscopy is the preferred test for detecting colon cancer. It is needed only once every 10 years if results are negative. While you are sedated, a flexible, lighted tube with a tiny camera is inserted into the rectum and advanced through the colon to look for cancers.     An alternative screening test that is used at home and returned to the lab may also be used. It detects hidden blood in bowel movements which could indicate cancer in the colon. If results are positive, you will need a colonoscopy to determine if the blood is a sign of cancer. This type of follow up (diagnostic) colonoscopy usually requires additional copays as required by your insurance provider.     If you recently had your colon cancer screening performed outside of [facility], please let your Health care team know so that they can update your health record. Please contact your PCP if you have any  questions.

## 2023-11-08 NOTE — PROGRESS NOTES
11/8/2023    HPI:  Frank Gay Jr. is a 68 y.o. male, who presents to clinic today for a shin of his right elbow pain and swelling.  States yesterday afternoon he was working on extensive plumbing work using a monkey wrench under his house.  States this occurred around 3:00 p.m..  Denies any acute injury that occurred at this time.  States later that night he began to have significant pain and swelling of the right elbow.  States this prompted him to follow up with our clinic.  States he had x-rays performed this morning at the [facility].  Denies any recent puncture wounds or in her injuries that he can recall.  Denies any paresthesias.  States pain can reach up to 8/10 with activity.  States pain is minimal at rest.  Denies any other complaints at this time.    PMHX:  Past Medical History:   Diagnosis Date    Arthritis     Cancer     PROSTATE    Elevated PSA     Gout, unspecified     Personal history of venous thrombosis and embolism     After trauma in the 70's, he had a DVT with a PE    Pulmonary embolism     Sleep apnea     RESOLVED WITH UPPP AND SEPTOPLASTY/ NO MACHINE       PSHX:  Past Surgical History:   Procedure Laterality Date    ADENOIDECTOMY      APPENDECTOMY      ELBOW SURGERY  05/2016    EYE SURGERY      Eyelid    JOINT REPLACEMENT Left 2013    KNEE    KNEE SURGERY      left knee    periodontal  03/2018    PROSTATE BIOPSY  2018    TONSILLECTOMY      TOTAL KNEE ARTHROPLASTY Right 12/18/2019    Procedure: ARTHROPLASTY, KNEE, TOTAL-DEPUY-SIGMA;  Surgeon: Ken Amezcua III, MD;  Location: [facility];  Service: Orthopedics;  Laterality: Right;    UVULOPALATOPHARYNGOPLASTY      AND SEPTOPLASTY       FMHX:  Family History   Problem Relation Age of Onset    Heart disease Mother         Age 93 Y    Prostate cancer Father     Cancer Father         prostate cancer       SOCHX:  Social History     Tobacco Use    Smoking status: Never    Smokeless tobacco: Former     Quit date: 7/1/2017   Substance Use  Topics    Alcohol use: No       ALLERGIES:  Celebrex  [celecoxib], Dairy aid  [lactase], Eliquis  [apixaban], Influenza a (h1n1) vac 09 (pf), and Lactose    CURRENT MEDICATIONS:  Current Outpatient Medications on File Prior to Visit   Medication Sig Dispense Refill    LIDOcaine-TETRAcaine 7-7 % Crea APPLY 2 GRAMS TO THE AFFECTED AREAS 3-4 TIMES DAILY, 30 g 0    triamcinolone acetonide 0.1% (KENALOG) 0.1 % cream Apply topically 2 (two) times daily.       Current Facility-Administered Medications on File Prior to Visit   Medication Dose Route Frequency Provider Last Rate Last Admin    [COMPLETED] cefTRIAXone (ROCEPHIN) 1 g in dextrose 5 % in water (D5W) 100 mL IVPB (MB+)  1 g Intravenous Once Pre-Op Juan Calhoun  mL/hr at 11/08/23 0701 1 g at 11/08/23 0701    [COMPLETED] LIDOcaine (PF) 10 mg/ml (1%) injection 10 mg  1 mL Other Once Maikel Schmidt MD   0.1 mg at 11/08/23 0644    [DISCONTINUED] gentamicin (GARAMYCIN) 240 mg in sodium chloride 0.9% 100 mL IVPB  240 mg Intravenous Once Pre-Op Juan Calhoun MD        [DISCONTINUED] glycopyrrolate (PF) injection   Intravenous PRN Arelis Sneed CRNA   0.2 mg at 11/08/23 0728    [DISCONTINUED] HYDROmorphone injection 0.5 mg  0.5 mg Intravenous Q5 Min PRN Maikel Schmidt MD        [DISCONTINUED] HYDROmorphone injection   Intravenous PRN Arelis Sneed CRNA   0.5 mg at 11/08/23 0728    [DISCONTINUED] lactated ringers infusion   Intravenous Continuous PRN Arelis Sneed CRNA   New Bag at 11/08/23 0718    [DISCONTINUED] LIDOcaine (cardiac) injection   Intravenous PRN Arelis Sneed CRNA   20 mg at 11/08/23 0727    [DISCONTINUED] LORazepam injection 0.5 mg  0.5 mg Intravenous Q5 Min PRN Maikel Schmidt MD        [DISCONTINUED] meperidine (PF) injection 12.5 mg  12.5 mg Intravenous Q15 Min PRN Maikel Schmidt MD        [DISCONTINUED] midazolam in 0.9 % sod chlorid 2 mg/2 mL (1 mg/mL) injection   Intravenous PRN Arelis Sneed, JOSUE   2 mg at  11/08/23 0725    [DISCONTINUED] ondansetron injection 4 mg  4 mg Intravenous Daily PRN Maikel Schmidt MD        [DISCONTINUED] prochlorperazine injection Soln 10 mg  10 mg Intravenous Q30 Min PRN Maikel Schmidt MD        [DISCONTINUED] propofol (DIPRIVAN) 10 mg/mL infusion   Intravenous Continuous PRN Arelis Sneed CRNA   Stopped at 11/08/23 0741       REVIEW OF SYSTEMS:  Review of Systems Complete; Negative, unless noted above.    GENERAL PHYSICAL EXAM:   There were no vitals taken for this visit.   GEN: well developed, well nourished, no acute distress   PULM: No wheezing, no respiratory distress   CV: RRR    ORTHO EXAM:   Examination of the right elbow reveals mild edema.  No erythema, ecchymosis, or skin breakdown.  No fluctuance, drainage, purulence, or any other signs of infection.  Reduced range of motion secondary to pain.  Tenderness with moderate range of motion of the right elbow.  No significant tenderness with mild range of motion of the elbow.  Tenderness palpation of the joints of the right elbow.  Strength not tested secondary to injury.  Normal sensation in the radial, ulnar, median nerve distributions.  Capillary refill less than 2 seconds.    RADIOLOGY:   X-rays of the right elbow were taken today in clinic.  X-rays reviewed by myself.  Imaging showed the presence of severe degenerative changes of the humeral ulnar joint, as well as severe degenerative changes of the radiocapitellar joint.  Presence of associated osteophyte formation noted of the right elbow joint.  No definitive acute fracture or dislocation.  No subluxation.  No radiopaque foreign body or mass noted.  No osseous destructive/erosive processes noted.  No other significant bony abnormalities noted.    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 I discussed with Frank Gay Jr. the elbow osteoarthritis pathology and treatment options in detail during today's visit.  After  treatment options were discussed, we decided the best course of action this time is perform a course of oral steroids and immobilization via his right arm sling.  We discussed he is not a candidate for oral prescription NSAIDs due to an adverse reaction to Celebrex.  We did discuss the importance of beginning gentle range-of-motion exercises of the right elbow as demonstrated in clinic.  We did discuss for any worsening of his symptoms to report to nearest emergency department.  He verbally agreed with the treatment plan    2. He was prescribed a Medrol Dosepak in clinic today.  He was instructed to discontinue the medication for any adverse effects.  He verbalized understanding.    3. I would like him follow-up in clinic in 3 weeks for repeat evaluation.  He was instructed to contact the clinic for any problems or concerns in the interim.

## 2023-11-09 ENCOUNTER — PATIENT MESSAGE (OUTPATIENT)
Dept: UROLOGY | Facility: CLINIC | Age: 68
End: 2023-11-09
Payer: COMMERCIAL

## 2023-11-10 ENCOUNTER — TELEPHONE (OUTPATIENT)
Dept: HEMATOLOGY/ONCOLOGY | Facility: CLINIC | Age: 68
End: 2023-11-10
Payer: COMMERCIAL

## 2023-11-10 DIAGNOSIS — R97.20 ELEVATED PSA: Primary | ICD-10-CM

## 2023-11-10 NOTE — NURSING
Spoke with patient to schedule  MDC and PET scan.  Patient agreed to PET on 11/14 @ 3 pm and clinic on 11/6 @ 1 pm.  Patient verbalized understanding of date, times and locations.   Spoke with Rossy, she booked and ordered dose.  Patient scheduled with Dr. Calhoun and Dr. Mortensen on 11/16 - opened clinic for  MDC, XRT, Lucia, notified that patient is scheduled.

## 2023-11-14 ENCOUNTER — HOSPITAL ENCOUNTER (OUTPATIENT)
Dept: RADIOLOGY | Facility: HOSPITAL | Age: 68
Discharge: HOME | End: 2023-11-14
Attending: STUDENT IN AN ORGANIZED HEALTH CARE EDUCATION/TRAINING PROGRAM
Payer: COMMERCIAL

## 2023-11-14 DIAGNOSIS — C61 PROSTATE CANCER: ICD-10-CM

## 2023-11-14 PROCEDURE — 78815 NM PET CT F 18 PYL PSMA, MIDTHIGH TO VERTEX: ICD-10-PCS | Mod: 26,PI,, | Performed by: RADIOLOGY

## 2023-11-14 PROCEDURE — 78815 PET IMAGE W/CT SKULL-THIGH: CPT | Mod: TC,PN

## 2023-11-14 PROCEDURE — 78815 PET IMAGE W/CT SKULL-THIGH: CPT | Mod: 26,PI,, | Performed by: RADIOLOGY

## 2023-11-14 PROCEDURE — A9595 NM PET CT F 18 PYL PSMA, MIDTHIGH TO VERTEX: HCPCS | Mod: TB,PN

## 2023-11-14 NOTE — PROGRESS NOTES
PET Imaging Questionnaire    Are you a Diabetic? Recent Blood Sugar level? No    Are you anemic? Bone Marrow Stimulation Meds? No    Have you had a CT Scan, if so when & where was your last one? No    Have you had a PET Scan, if so when & where was your last one? No    Chemotherapy or currently on Chemotherapy? No    Radiation therapy? No    Surgical History:   Past Surgical History:   Procedure Laterality Date    ADENOIDECTOMY      APPENDECTOMY      BIOPSY, PROSTATE, USING PROSTATE MAPPING N/A 11/8/2023    Procedure: BIOPSY, PROSTATE, USING PROSTATE MAPPING;  Surgeon: Juan Calhoun MD;  Location: [facility];  Service: Urology;  Laterality: N/A;    ELBOW SURGERY  05/2016    EYE SURGERY      Eyelid    JOINT REPLACEMENT Left 2013    KNEE    KNEE SURGERY      left knee    periodontal  03/2018    PROSTATE BIOPSY  2018    TONSILLECTOMY      TOTAL KNEE ARTHROPLASTY Right 12/18/2019    Procedure: ARTHROPLASTY, KNEE, TOTAL-DEPUY-SIGMA;  Surgeon: Ken Amezcua III, MD;  Location: [facility];  Service: Orthopedics;  Laterality: Right;    UVULOPALATOPHARYNGOPLASTY      AND SEPTOPLASTY        Have you been fasting for at least 6 hours? No    Is there any chance you may be pregnant or breastfeeding? No    Assay: 10.5 MCi@:14.28   Injection Site:lt ac     Residual: .78 mCi@: 14.30   Technologist: Rossy Reeves Injected:9.72mCi

## 2023-11-16 ENCOUNTER — DOCUMENTATION ONLY (OUTPATIENT)
Dept: HEMATOLOGY/ONCOLOGY | Facility: CLINIC | Age: 68
End: 2023-11-16
Payer: COMMERCIAL

## 2023-11-16 ENCOUNTER — OFFICE VISIT (OUTPATIENT)
Dept: UROLOGY | Facility: CLINIC | Age: 68
End: 2023-11-16
Payer: COMMERCIAL

## 2023-11-16 ENCOUNTER — OFFICE VISIT (OUTPATIENT)
Dept: RADIATION ONCOLOGY | Facility: CLINIC | Age: 68
End: 2023-11-16
Payer: COMMERCIAL

## 2023-11-16 VITALS
SYSTOLIC BLOOD PRESSURE: 138 MMHG | HEART RATE: 46 BPM | SYSTOLIC BLOOD PRESSURE: 138 MMHG | RESPIRATION RATE: 16 BRPM | HEIGHT: 72 IN | DIASTOLIC BLOOD PRESSURE: 78 MMHG | WEIGHT: 213.38 LBS | OXYGEN SATURATION: 99 % | WEIGHT: 213.38 LBS | HEART RATE: 46 BPM | OXYGEN SATURATION: 99 % | BODY MASS INDEX: 28.94 KG/M2 | DIASTOLIC BLOOD PRESSURE: 78 MMHG | BODY MASS INDEX: 28.9 KG/M2

## 2023-11-16 DIAGNOSIS — C61 PROSTATE CANCER: Primary | ICD-10-CM

## 2023-11-16 DIAGNOSIS — R91.1 LUNG NODULE: ICD-10-CM

## 2023-11-16 DIAGNOSIS — R91.1 PULMONARY NODULE: ICD-10-CM

## 2023-11-16 DIAGNOSIS — R97.20 ELEVATED PSA: ICD-10-CM

## 2023-11-16 DIAGNOSIS — C61 PROSTATE CA: Primary | ICD-10-CM

## 2023-11-16 PROCEDURE — 3044F HG A1C LEVEL LT 7.0%: CPT | Mod: CPTII,S$GLB,, | Performed by: RADIOLOGY

## 2023-11-16 PROCEDURE — 1160F RVW MEDS BY RX/DR IN RCRD: CPT | Mod: CPTII,S$GLB,, | Performed by: STUDENT IN AN ORGANIZED HEALTH CARE EDUCATION/TRAINING PROGRAM

## 2023-11-16 PROCEDURE — 99999 PR PBB SHADOW E&M-EST. PATIENT-LVL III: CPT | Mod: PBBFAC,,, | Performed by: STUDENT IN AN ORGANIZED HEALTH CARE EDUCATION/TRAINING PROGRAM

## 2023-11-16 PROCEDURE — 99215 OFFICE O/P EST HI 40 MIN: CPT | Mod: S$GLB,,, | Performed by: STUDENT IN AN ORGANIZED HEALTH CARE EDUCATION/TRAINING PROGRAM

## 2023-11-16 PROCEDURE — 1160F PR REVIEW ALL MEDS BY PRESCRIBER/CLIN PHARMACIST DOCUMENTED: ICD-10-PCS | Mod: CPTII,S$GLB,, | Performed by: STUDENT IN AN ORGANIZED HEALTH CARE EDUCATION/TRAINING PROGRAM

## 2023-11-16 PROCEDURE — 3008F PR BODY MASS INDEX (BMI) DOCUMENTED: ICD-10-PCS | Mod: CPTII,S$GLB,, | Performed by: RADIOLOGY

## 2023-11-16 PROCEDURE — 3044F HG A1C LEVEL LT 7.0%: CPT | Mod: CPTII,S$GLB,, | Performed by: STUDENT IN AN ORGANIZED HEALTH CARE EDUCATION/TRAINING PROGRAM

## 2023-11-16 PROCEDURE — 99215 PR OFFICE/OUTPT VISIT, EST, LEVL V, 40-54 MIN: ICD-10-PCS | Mod: S$GLB,,, | Performed by: STUDENT IN AN ORGANIZED HEALTH CARE EDUCATION/TRAINING PROGRAM

## 2023-11-16 PROCEDURE — 3078F DIAST BP <80 MM HG: CPT | Mod: CPTII,S$GLB,, | Performed by: STUDENT IN AN ORGANIZED HEALTH CARE EDUCATION/TRAINING PROGRAM

## 2023-11-16 PROCEDURE — 3008F BODY MASS INDEX DOCD: CPT | Mod: CPTII,S$GLB,, | Performed by: STUDENT IN AN ORGANIZED HEALTH CARE EDUCATION/TRAINING PROGRAM

## 2023-11-16 PROCEDURE — 99999 PR PBB SHADOW E&M-EST. PATIENT-LVL III: ICD-10-PCS | Mod: PBBFAC,,, | Performed by: STUDENT IN AN ORGANIZED HEALTH CARE EDUCATION/TRAINING PROGRAM

## 2023-11-16 PROCEDURE — 99999 PR PBB SHADOW E&M-EST. PATIENT-LVL III: CPT | Mod: PBBFAC,,, | Performed by: RADIOLOGY

## 2023-11-16 PROCEDURE — 3078F PR MOST RECENT DIASTOLIC BLOOD PRESSURE < 80 MM HG: ICD-10-PCS | Mod: CPTII,S$GLB,, | Performed by: STUDENT IN AN ORGANIZED HEALTH CARE EDUCATION/TRAINING PROGRAM

## 2023-11-16 PROCEDURE — 1126F AMNT PAIN NOTED NONE PRSNT: CPT | Mod: CPTII,S$GLB,, | Performed by: STUDENT IN AN ORGANIZED HEALTH CARE EDUCATION/TRAINING PROGRAM

## 2023-11-16 PROCEDURE — 1159F MED LIST DOCD IN RCRD: CPT | Mod: CPTII,S$GLB,, | Performed by: STUDENT IN AN ORGANIZED HEALTH CARE EDUCATION/TRAINING PROGRAM

## 2023-11-16 PROCEDURE — 99205 PR OFFICE/OUTPT VISIT, NEW, LEVL V, 60-74 MIN: ICD-10-PCS | Mod: S$GLB,,, | Performed by: RADIOLOGY

## 2023-11-16 PROCEDURE — 3008F PR BODY MASS INDEX (BMI) DOCUMENTED: ICD-10-PCS | Mod: CPTII,S$GLB,, | Performed by: STUDENT IN AN ORGANIZED HEALTH CARE EDUCATION/TRAINING PROGRAM

## 2023-11-16 PROCEDURE — 1159F PR MEDICATION LIST DOCUMENTED IN MEDICAL RECORD: ICD-10-PCS | Mod: CPTII,S$GLB,, | Performed by: STUDENT IN AN ORGANIZED HEALTH CARE EDUCATION/TRAINING PROGRAM

## 2023-11-16 PROCEDURE — 1126F AMNT PAIN NOTED NONE PRSNT: CPT | Mod: CPTII,S$GLB,, | Performed by: RADIOLOGY

## 2023-11-16 PROCEDURE — 99205 OFFICE O/P NEW HI 60 MIN: CPT | Mod: S$GLB,,, | Performed by: RADIOLOGY

## 2023-11-16 PROCEDURE — 3078F PR MOST RECENT DIASTOLIC BLOOD PRESSURE < 80 MM HG: ICD-10-PCS | Mod: CPTII,S$GLB,, | Performed by: RADIOLOGY

## 2023-11-16 PROCEDURE — 99999 PR PBB SHADOW E&M-EST. PATIENT-LVL III: ICD-10-PCS | Mod: PBBFAC,,, | Performed by: RADIOLOGY

## 2023-11-16 PROCEDURE — 3075F PR MOST RECENT SYSTOLIC BLOOD PRESS GE 130-139MM HG: ICD-10-PCS | Mod: CPTII,S$GLB,, | Performed by: RADIOLOGY

## 2023-11-16 PROCEDURE — 3075F SYST BP GE 130 - 139MM HG: CPT | Mod: CPTII,S$GLB,, | Performed by: RADIOLOGY

## 2023-11-16 PROCEDURE — 1126F PR PAIN SEVERITY QUANTIFIED, NO PAIN PRESENT: ICD-10-PCS | Mod: CPTII,S$GLB,, | Performed by: RADIOLOGY

## 2023-11-16 PROCEDURE — 3075F PR MOST RECENT SYSTOLIC BLOOD PRESS GE 130-139MM HG: ICD-10-PCS | Mod: CPTII,S$GLB,, | Performed by: STUDENT IN AN ORGANIZED HEALTH CARE EDUCATION/TRAINING PROGRAM

## 2023-11-16 PROCEDURE — 3044F PR MOST RECENT HEMOGLOBIN A1C LEVEL <7.0%: ICD-10-PCS | Mod: CPTII,S$GLB,, | Performed by: RADIOLOGY

## 2023-11-16 PROCEDURE — 3044F PR MOST RECENT HEMOGLOBIN A1C LEVEL <7.0%: ICD-10-PCS | Mod: CPTII,S$GLB,, | Performed by: STUDENT IN AN ORGANIZED HEALTH CARE EDUCATION/TRAINING PROGRAM

## 2023-11-16 PROCEDURE — 1126F PR PAIN SEVERITY QUANTIFIED, NO PAIN PRESENT: ICD-10-PCS | Mod: CPTII,S$GLB,, | Performed by: STUDENT IN AN ORGANIZED HEALTH CARE EDUCATION/TRAINING PROGRAM

## 2023-11-16 PROCEDURE — 3078F DIAST BP <80 MM HG: CPT | Mod: CPTII,S$GLB,, | Performed by: RADIOLOGY

## 2023-11-16 PROCEDURE — 3008F BODY MASS INDEX DOCD: CPT | Mod: CPTII,S$GLB,, | Performed by: RADIOLOGY

## 2023-11-16 PROCEDURE — 3075F SYST BP GE 130 - 139MM HG: CPT | Mod: CPTII,S$GLB,, | Performed by: STUDENT IN AN ORGANIZED HEALTH CARE EDUCATION/TRAINING PROGRAM

## 2023-11-16 NOTE — PROGRESS NOTES
[facility] - Urology   Clinic Note    Subjective:     Chief Complaint: Prostate Cancer      History of Present Illness:  Frank Gay Jr. is a 68 y.o. male who presents to clinic for evaluation and management of prostate cancer. He is established to our clinic    PSA at diagnosis - 22.2. Prostate volume - 30 cc. PSA density - 0.74.  MRI prostate showed PI-RADS 5 lesion with SV invasion, suggestion of EPE.  Uronav fusion biopsy on 11/8/23 showed:  Lesion #1 - Group 3, 60% pattern 4, 4/4 cores, Cribriform glands  Right Base - Group 3, 70%, 2/2 cores  Right mid - Group 2, 2/2 cores  Right [place] -Group 2, 2/2 cores  Left Base - Group 2, 2/2 cores  Percentage of cores positive: 84%, 11/13 adjusted for target  PSMA PET scan shows no evidence of metastatic disease from the prostate however there is concern for a primary pulmonary lesion  Stage - T3a/T3b my MRI    NCCN - very high risk    He reports previous abdominal surgeries including open appendectomy     Past medical, family, surgical and social history reviewed as documented in chart with pertinent positive medical, family, surgical and social history detailed in HPI.    A review systems was conducted with pertinent positive and negative findings documented in HPI.    Objective:     Estimated body mass index is 28.94 kg/m² as calculated from the following:    Height as of 11/8/23: 6' (1.829 m).    Weight as of this encounter: 96.8 kg (213 lb 6.5 oz).    Vital Signs (Most Recent)  Pulse: (!) 46 (11/16/23 1310)  BP: 138/78 (11/16/23 1310)  SpO2: 99 % (11/16/23 1310)    Physical Exam  Constitutional:       General: He is not in acute distress.     Appearance: He is not ill-appearing or toxic-appearing.   Pulmonary:      Effort: Pulmonary effort is normal. No accessory muscle usage or respiratory distress.   Neurological:      Mental Status: He is alert.         Lab Results   Component Value Date    BUN 17 10/16/2023    CREATININE 0.8 10/16/2023    WBC 6.60  10/30/2023    HGB 13.2 (L) 10/30/2023    HCT 40.7 10/30/2023     10/30/2023    AST 19 10/16/2023    ALT 16 10/16/2023    ALKPHOS 78 10/16/2023    ALBUMIN 3.9 10/16/2023    HGBA1C 5.4 07/22/2023        Lab Results   Component Value Date    PSA 5.7 (H) 01/02/2020    PSA 3.5 06/11/2014    PSA 3.11 01/02/2013    PSA 4.24 (H) 12/06/2012    PSA 3.8 09/25/2009    PSA 2.0 06/10/2008    PSA 2.2 05/01/2006    PSADIAG 22.2 (H) 09/05/2023    PSADIAG 15.48 (H) 07/22/2023    PSADIAG 2.1 09/10/2013        Assessment:     1. Prostate cancer    2. Pulmonary nodule      Plan:     We discussed his prostate cancer in depth. We reviewed his diagnosis, stage, grade, risk group, and prognosis. We discussed NCCN risk stratification and discussed the concept of low risk, intermediate risk, and high risk disease. He has very high risk disease.  We also reviewed the NCCN treatment nomogram. We discussed the different treatment options including active surveillance (as well as the surveillance protocol), prostate brachytherapy, external beam radiation therapy, HIFU, and robotic prostatectomy. We also discussed the advantages, disadvantages, risks and benefits, as well as complications of each option.    - Regarding radiation therapy we discussed treatment planning, the different techniques, short and long term complications including radiation cystitis, radiation proctitis, and impotence. We discussed success, failure, and salvage therapeutic options.    - Regarding surgical therapy, we discussed surgery including preoperative preparation, surgical technique (including bladder neck and nerve-sparing techniques), postoperative recuperation and recovery. We reviewed short and long term complications including bleeding, catheter dislodgement, etc. We discussed the risk of anastomotic leakage and urethral stenosis. We discussed the risks of incontinence, impotence, and recurrence. We discussed pre-op and post-op Kegel exercises, post-op  penile rehab, and treatment options for incontinence and impotence. We discussed rates of cancer free survival and recurrence, as well as salvage therapeutic options. We discussed the possible indications for adjuvant radiation therapy.    - We discussed genetic testing with both somatic and germline testing. We reviewed the indications, utility, and implications of testing    - He was given the opportunity to ask questions, and his questions and concerns were answered to his satisfaction.    - referral to med/onc  - referral to pulm to consider biopsy  - follow up afterward to discuss options  - Discussed with Dr. Balbina Calhoun MD     Total time today for this encounter was 45 minutes. This includes preparing to see the patient (eg, review of tests), obtaining and/or reviewing separately obtained history, documenting clinical information in the electronic or other health record, independently interpreting results and communicating results to the patient/family/caregiver, and discussing the plan with other providers when necessary.

## 2023-11-16 NOTE — PROGRESS NOTES
11/16/2023    [facility]   Radiation Oncology Consultation    Assessment   This is a 68 y.o. y/o male with clinical [id number] Grade Group 3, PSA 15.48,  Very High Risk adenocarcinoma of the prostate.  He presents today to discuss definitve management with radiation therapy. Of note PSMA/PET identified Superior Right Lower Lobe Lung nodule concerning for early stage bronchogenic carcinoma.     Plan     Lung work-up will proceed with biopsy. Will follow along with management of this disease in parallel with prostate cancer management.  Treatment options were discussed with the patient including radical prostatectomy, radiation therapy plus ADT and abiraterone.  We discussed the goals of treatment to be curative.  The risks, benefits, scheduling, alternatives to and rationale of radiation therapy were explained in detail.    Indication, course, and potential toxicities of pelvic radiation therapy reviewed in detail, including but not limited to fatigue, increased frequency, urgency, or pain with urination, increased frequency or urgency of bowel movements, diarrhea, pelvic bone fragility, erectile dysfunction, decreased volume of ejaculate, remote risk of secondary malignancy.   After this discussion, he elected to proceed with lung work up and visit with Dr. Carnes to discuss systemic therapy.    My plan will be to follow up in 1 month to proceed with potential lung treatment and/or prostate treatment.    Radiation Treatment Details:   We plan to treat the prostate and SVs to a dose of 70 Gy in 28 fractions at 2.5 Gy per fraction delivered daily  We will utilize a(n) IMRT technique.   IMRT is medically necessary to treat complex dose painted target volumes in the prostate region with concave and convex isodose lines with steep isodose gradients to spare multiple adjacent organs at risk including the rectum, bladder, penile bulb   We will utilize daily CT or orthogonal image guidance due to the need  for accurate daily patient alignment to treat the target volumes accurately and avoid radiation overdose to multiple regional organs at risk since we are treating the patient with complex target volumes with multiple steep isodose gradients.          Oncology History    No history exists.       HPI: The patient is a 68 year old male with a diagnosis of prostate cancer.  He was noted to have an elevated PSA of 15.74 on 9/5/23.  Previous PSA history as follows:  Component      Latest Ref Rng 5/1/2006 6/10/2008 9/25/2009   Prostate Specific Antigen      0.00 - 4.00 ng/mL 2.2  2.0  3.8    PROSTATE SPECIFIC ANTIGEN, SCR - [facility]      < OR = 4.00 ng/mL        Component      Latest Ref Rng 12/6/2012 1/2/2013 6/11/2014   Prostate Specific Antigen      0.00 - 4.00 ng/mL 4.24 (H)  3.11  3.5    PROSTATE SPECIFIC ANTIGEN, SCR - [facility]      < OR = 4.00 ng/mL        Component      Latest Ref Rng 1/2/2020 7/22/2023   Prostate Specific Antigen      0.00 - 4.00 ng/mL 5.7 (H)     PROSTATE SPECIFIC ANTIGEN, SCR - [facility]      < OR = 4.00 ng/mL  15.48 (H)         On 11/8/23 he underwent TRUS-guided biopsy of the prostate, with pathology as follows:  1. PROSTATE, LESION # 1, CORE BIOPSY   - PROSTATIC ADENOCARCINOMA, LUKAS SCORE 4+3=7 (GRADE GROUP 3) [60%    PATTERN 4]   - 35 MM (NEARLY 100% OF SUBMITTED TISSUE), 4 OF 4 CORES   - NEGATIVE FOR PERINEURAL INVASION   - POSITIVE FOR LYMPHOVASCULAR INVASION   - CRIBRIFORM GLANDS PRESENT     2. PROSTATE, LEFT BASE, CORE BIOPSY   - PROSTATIC ADENOCARCINOMA, LUKAS SCORE 3+4=7 (GRADE GROUP 2) [10%    PATTERN 4]   - 34 MM (NEARLY 100% OF SUBMITTED TISSUE), 2 OF 2 CORES   - NEGATIVE FOR PERINEURAL INVASION   - CRIBRIFORM GLANDS PRESENT     3. PROSTATE, LEFT MID, CORE BIOPSY   - PROSTATIC ADENOCARCINOMA, LUKAS SCORE 3+3=6 (GRADE GROUP 1)   - 6 MM (32% OF SUBMITTED TISSUE), 2 OF 2 CORES   - NEGATIVE FOR PERINEURAL INVASION     4. PROSTATE, LEFT APEX, CORE BIOPSY   - ATYPICAL GLANDS SUSPICIOUS FOR  PROSTATIC ADENOCARCINOMA     5. PROSTATE, RIGHT BASE, CORE BIOPSY   - PROSTATIC ADENOCARCINOMA, LUKAS SCORE 4+3=7 (GRADE GROUP 3) [70%    PATTERN 4]   - 26 MM (NEARLY 100% OF SUBMITTED TISSUE), 2 OF 2 CORES   - POSITIVE FOR PERINEURAL INVASION   - CRIBRIFORM GLANDS PRESENT     6. PROSTATE, RIGHT MID, CORE BIOPSY   - PROSTATIC ADENOCARCINOMA, LUKAS SCORE 3+4=7 (GRADE GROUP 2) [40%    PATTERN 4]   - 16 MM (80% OF SUBMITTED TISSUE), 2 OF 2 CORES   - NEGATIVE FOR PERINEURAL INVASION   - POSITIVE FOR LYMPHOVASCULAR INVASION     7. PROSTATE, RIGHT APEX, CORE BIOPSY   - PROSTATIC ADENOCARCINOMA, LUKAS SCORE 3+4=7 (GRADE GROUP 2) [40%    PATTERN 4]   - 3 MM (15% OF SUBMITTED TISSUE), 2 OF 2 CORES   - POSITIVE FOR PERINEURAL INVASION   - CRIBRIFORM GLANDS PRESENT     10/3/23 MRI prostate noted:  The prostate volume is estimated at 30 cc. One aggressive and highly   concerning lesion was identified in the prostate and is detailed below:     Lesion 1: A 32 millimeter x 10 millimeter by 18 millimeter aggressive   appearing lesion is present involving the medial and lateral segments of both  peripheral zones from the base to the apex and demonstrates markedly   restricted diffusion and brisk abnormal enhancement. Multi para metric   characteristics are virtually diagnostic of prostatic malignancy (PI RADS   5/5). Minimal invasion of the right seminal vesicles is demonstrated on image  8 of series 19. The prostatic capsule is indistinct but does not appear   discretely disrupted. No definite perineural spread demonstrated on today's   study.      No lymphadenopathy seen to the included lymph nodes.      An 8 millimeter nodule is partially visualized in the left iliac bone   adjacent to the sacroiliac joint and is of unclear clinical significance.   This may be further evaluated with bone scan if the patient ultimately proves  positive for prostatic malignancy.     PSMA PET/CT 11/14/23 revealed:  Abnormal activity confined  to the prostate gland and seminal vesicles, consistent with known malignancy.  No evidence of lymph node metastases nor distant metastatic disease.     Spiculated appearing lung nodule is concerning for incidental bronchogenic carcinoma.    Prostate size estimated at 30 cc.  He presents today to discuss the potential role of radiation therapy in his cancer care.  No significant obstructive urinary symptoms. Erectile function at baseline.     He has no significant pulmonary complaints.    History of prior irradiation: No  History of prior systemic anti-cancer therapy: No  History of collagen vascular disease: No  Implanted electronic device (pacer/defib/nerve stimulator): No    Past Medical History:   Diagnosis Date    Arthritis     Cancer     PROSTATE    Elevated PSA     Gout, unspecified     Personal history of venous thrombosis and embolism     After trauma in the 70's, he had a DVT with a PE    Pulmonary embolism     Sleep apnea     RESOLVED WITH UPPP AND SEPTOPLASTY/ NO MACHINE       Past Surgical History:   Procedure Laterality Date    ADENOIDECTOMY      APPENDECTOMY      BIOPSY, PROSTATE, USING PROSTATE MAPPING N/A 11/8/2023    Procedure: BIOPSY, PROSTATE, USING PROSTATE MAPPING;  Surgeon: Juan Calhonu MD;  Location: [facility];  Service: Urology;  Laterality: N/A;    ELBOW SURGERY  05/2016    EYE SURGERY      Eyelid    JOINT REPLACEMENT Left 2013    KNEE    KNEE SURGERY      left knee    periodontal  03/2018    PROSTATE BIOPSY  2018    TONSILLECTOMY      TOTAL KNEE ARTHROPLASTY Right 12/18/2019    Procedure: ARTHROPLASTY, KNEE, TOTAL-DEPUY-SIGMA;  Surgeon: Ken Amezcua III, MD;  Location: [facility];  Service: Orthopedics;  Laterality: Right;    UVULOPALATOPHARYNGOPLASTY      AND SEPTOPLASTY       Social History     Tobacco Use    Smoking status: Never    Smokeless tobacco: Former     Quit date: 7/1/2017   Substance Use Topics    Alcohol use: No    Drug use: No       Cancer-related family history includes  Cancer in his father; Prostate cancer in his father.    Current Outpatient Medications on File Prior to Visit   Medication Sig Dispense Refill    LIDOcaine-TETRAcaine 7-7 % Crea APPLY 2 GRAMS TO THE AFFECTED AREAS 3-4 TIMES DAILY, 30 g 0    methylPREDNISolone (MEDROL DOSEPACK) 4 mg tablet use as directed 21 each 0    triamcinolone acetonide 0.1% (KENALOG) 0.1 % cream Apply topically 2 (two) times daily.       No current facility-administered medications on file prior to visit.       Review of patient's allergies indicates:   Allergen Reactions    Celebrex  [celecoxib] Blisters, Dermatitis, Edema, Hallucinations, Hives, Itching and Swelling    Dairy aid  [lactase]      Other reaction(s): stomach cramps  Other reaction(s): Itching  Other reaction(s): Diarrhea    Eliquis  [apixaban] Blisters, Edema, Hallucinations, Other (See Comments) and Swelling    Influenza a (h1n1) vac 09 (pf)     Lactose        Review of Systems:  Constistutional: Denies fever, chills. No recent weight changes. Denies nights sweats.  Eyes: No redness or dryness.  ENT: Denies dry mouth. No ulcers.  Card: Denies chest pain. No PND, or orthopnea.   Resp: Denies cough. Denies shortness of breath.  Gastro: Denies nausea or vomiting, constipation, diarrhea.  Genito: No kidney stones. Denies dysuria.  Skin: No rash, psoriasis, or alopecia.  Musculoskeletal:No myalgia. No muscle weakness.  Neuro: No numbness or tingling. No history of seizures or psychosis.  Psych: Denies depression. Denies anxiety.  Endo: Denies history of diabetes. No thyroid disease.  Heme: Denies anemia. No blood clots or bleeding diathesis.  Allergic: Denies seasonal allergies.   All other systems negative    Vital Signs: /78 (BP Location: Right arm, Patient Position: Sitting, BP Method: Medium (Automatic))   Pulse (!) 46   Resp 16   Ht 6' (1.829 m)   Wt 96.8 kg (213 lb 6.5 oz)   SpO2 99%   BMI 28.94 kg/m²     ECOG Performance Status: 0 - Fully Active    Physical  Exam:  General:  Well-appearing, nontoxic  Eyes:  Equal and round pupils, EOMI, no scleral icterus  Mouth:  No lesions, moist  Cardiovascular:  Warm, well-perfused, no peripheral edema  Lungs:  Unlabored on room air, no wheezing  Neurologic:  Awake, alert and oriented, participating in the exam  Psych:  Appropriate mood and affect  Skin:  Normal pallor, No rashes  Heme:  No petechiae, no purpura       Imaging: I have personally reviewed the patient's available images and reports and summarized pertinent findings above in HPI.     Pathology: I have personally reviewed the patient's available pathology and summarized pertinent findings above in HPI.     This case was discussed with Dr. Calhoun      - Thank you for allowing me to participate in the care of your patient.     Cholo Mortensen MD  Radiation Oncology

## 2023-11-16 NOTE — NURSING
Patient seen by Dr. Calhoun and Dr. Mortensen in [facility]. Patient has been scheduled to see Dr. Carnes next week and referral to pulmonary has been entered for Nodule bx.  Patient is aware and verbalized understanding.

## 2023-11-17 ENCOUNTER — TELEPHONE (OUTPATIENT)
Dept: HEMATOLOGY/ONCOLOGY | Facility: CLINIC | Age: 68
End: 2023-11-17
Payer: COMMERCIAL

## 2023-11-17 DIAGNOSIS — C61 PROSTATE CANCER: Primary | ICD-10-CM

## 2023-11-17 NOTE — NURSING
Spoke with patient regarding CT Chest Scan.   Scan has been requested by Dr. Fabian to review for possible nodule bx.  Patient has been scheduled for 11/22 @ 3:15 p. Patient will see Dr. Carnes at 3 pm then have CT scan next door.  Patient verbalized understanding of date, time and location.

## 2023-11-22 ENCOUNTER — OFFICE VISIT (OUTPATIENT)
Dept: HEMATOLOGY/ONCOLOGY | Facility: CLINIC | Age: 68
End: 2023-11-22
Payer: COMMERCIAL

## 2023-11-22 ENCOUNTER — HOSPITAL ENCOUNTER (OUTPATIENT)
Dept: RADIOLOGY | Facility: HOSPITAL | Age: 68
Discharge: HOME | End: 2023-11-22
Attending: RADIOLOGY
Payer: COMMERCIAL

## 2023-11-22 VITALS
SYSTOLIC BLOOD PRESSURE: 124 MMHG | OXYGEN SATURATION: 100 % | BODY MASS INDEX: 29.11 KG/M2 | HEART RATE: 50 BPM | DIASTOLIC BLOOD PRESSURE: 61 MMHG | WEIGHT: 214.94 LBS | TEMPERATURE: 97 F | RESPIRATION RATE: 15 BRPM | HEIGHT: 72 IN

## 2023-11-22 DIAGNOSIS — C34.90 MALIGNANT NEOPLASM OF UNSPECIFIED PART OF UNSPECIFIED BRONCHUS OR LUNG: Primary | ICD-10-CM

## 2023-11-22 DIAGNOSIS — C61 PROSTATE CA: ICD-10-CM

## 2023-11-22 DIAGNOSIS — R91.1 LUNG NODULE: ICD-10-CM

## 2023-11-22 DIAGNOSIS — C34.90 MALIGNANT NEOPLASM OF UNSPECIFIED PART OF UNSPECIFIED BRONCHUS OR LUNG: ICD-10-CM

## 2023-11-22 PROCEDURE — 25500020 PHARM REV CODE 255: Mod: PO | Performed by: RADIOLOGY

## 2023-11-22 PROCEDURE — 1159F MED LIST DOCD IN RCRD: CPT | Mod: CPTII,S$GLB,, | Performed by: STUDENT IN AN ORGANIZED HEALTH CARE EDUCATION/TRAINING PROGRAM

## 2023-11-22 PROCEDURE — 99205 PR OFFICE/OUTPT VISIT, NEW, LEVL V, 60-74 MIN: ICD-10-PCS | Mod: S$GLB,,, | Performed by: STUDENT IN AN ORGANIZED HEALTH CARE EDUCATION/TRAINING PROGRAM

## 2023-11-22 PROCEDURE — 3078F DIAST BP <80 MM HG: CPT | Mod: CPTII,S$GLB,, | Performed by: STUDENT IN AN ORGANIZED HEALTH CARE EDUCATION/TRAINING PROGRAM

## 2023-11-22 PROCEDURE — 3078F PR MOST RECENT DIASTOLIC BLOOD PRESSURE < 80 MM HG: ICD-10-PCS | Mod: CPTII,S$GLB,, | Performed by: STUDENT IN AN ORGANIZED HEALTH CARE EDUCATION/TRAINING PROGRAM

## 2023-11-22 PROCEDURE — 3044F PR MOST RECENT HEMOGLOBIN A1C LEVEL <7.0%: ICD-10-PCS | Mod: CPTII,S$GLB,, | Performed by: STUDENT IN AN ORGANIZED HEALTH CARE EDUCATION/TRAINING PROGRAM

## 2023-11-22 PROCEDURE — 1160F PR REVIEW ALL MEDS BY PRESCRIBER/CLIN PHARMACIST DOCUMENTED: ICD-10-PCS | Mod: CPTII,S$GLB,, | Performed by: STUDENT IN AN ORGANIZED HEALTH CARE EDUCATION/TRAINING PROGRAM

## 2023-11-22 PROCEDURE — 1126F PR PAIN SEVERITY QUANTIFIED, NO PAIN PRESENT: ICD-10-PCS | Mod: CPTII,S$GLB,, | Performed by: STUDENT IN AN ORGANIZED HEALTH CARE EDUCATION/TRAINING PROGRAM

## 2023-11-22 PROCEDURE — 3008F BODY MASS INDEX DOCD: CPT | Mod: CPTII,S$GLB,, | Performed by: STUDENT IN AN ORGANIZED HEALTH CARE EDUCATION/TRAINING PROGRAM

## 2023-11-22 PROCEDURE — 3074F PR MOST RECENT SYSTOLIC BLOOD PRESSURE < 130 MM HG: ICD-10-PCS | Mod: CPTII,S$GLB,, | Performed by: STUDENT IN AN ORGANIZED HEALTH CARE EDUCATION/TRAINING PROGRAM

## 2023-11-22 PROCEDURE — 3074F SYST BP LT 130 MM HG: CPT | Mod: CPTII,S$GLB,, | Performed by: STUDENT IN AN ORGANIZED HEALTH CARE EDUCATION/TRAINING PROGRAM

## 2023-11-22 PROCEDURE — 71260 CT THORAX DX C+: CPT | Mod: 26,,, | Performed by: RADIOLOGY

## 2023-11-22 PROCEDURE — 99999 PR PBB SHADOW E&M-EST. PATIENT-LVL IV: ICD-10-PCS | Mod: PBBFAC,,, | Performed by: STUDENT IN AN ORGANIZED HEALTH CARE EDUCATION/TRAINING PROGRAM

## 2023-11-22 PROCEDURE — 71260 CT CHEST WITH CONTRAST: ICD-10-PCS | Mod: 26,,, | Performed by: RADIOLOGY

## 2023-11-22 PROCEDURE — 99999 PR PBB SHADOW E&M-EST. PATIENT-LVL IV: CPT | Mod: PBBFAC,,, | Performed by: STUDENT IN AN ORGANIZED HEALTH CARE EDUCATION/TRAINING PROGRAM

## 2023-11-22 PROCEDURE — 3008F PR BODY MASS INDEX (BMI) DOCUMENTED: ICD-10-PCS | Mod: CPTII,S$GLB,, | Performed by: STUDENT IN AN ORGANIZED HEALTH CARE EDUCATION/TRAINING PROGRAM

## 2023-11-22 PROCEDURE — 3044F HG A1C LEVEL LT 7.0%: CPT | Mod: CPTII,S$GLB,, | Performed by: STUDENT IN AN ORGANIZED HEALTH CARE EDUCATION/TRAINING PROGRAM

## 2023-11-22 PROCEDURE — 1159F PR MEDICATION LIST DOCUMENTED IN MEDICAL RECORD: ICD-10-PCS | Mod: CPTII,S$GLB,, | Performed by: STUDENT IN AN ORGANIZED HEALTH CARE EDUCATION/TRAINING PROGRAM

## 2023-11-22 PROCEDURE — 71260 CT THORAX DX C+: CPT | Mod: TC,PO

## 2023-11-22 PROCEDURE — 99205 OFFICE O/P NEW HI 60 MIN: CPT | Mod: S$GLB,,, | Performed by: STUDENT IN AN ORGANIZED HEALTH CARE EDUCATION/TRAINING PROGRAM

## 2023-11-22 PROCEDURE — 3288F PR FALLS RISK ASSESSMENT DOCUMENTED: ICD-10-PCS | Mod: CPTII,S$GLB,, | Performed by: STUDENT IN AN ORGANIZED HEALTH CARE EDUCATION/TRAINING PROGRAM

## 2023-11-22 PROCEDURE — 1101F PR PT FALLS ASSESS DOC 0-1 FALLS W/OUT INJ PAST YR: ICD-10-PCS | Mod: CPTII,S$GLB,, | Performed by: STUDENT IN AN ORGANIZED HEALTH CARE EDUCATION/TRAINING PROGRAM

## 2023-11-22 PROCEDURE — 1160F RVW MEDS BY RX/DR IN RCRD: CPT | Mod: CPTII,S$GLB,, | Performed by: STUDENT IN AN ORGANIZED HEALTH CARE EDUCATION/TRAINING PROGRAM

## 2023-11-22 PROCEDURE — 1126F AMNT PAIN NOTED NONE PRSNT: CPT | Mod: CPTII,S$GLB,, | Performed by: STUDENT IN AN ORGANIZED HEALTH CARE EDUCATION/TRAINING PROGRAM

## 2023-11-22 PROCEDURE — 3288F FALL RISK ASSESSMENT DOCD: CPT | Mod: CPTII,S$GLB,, | Performed by: STUDENT IN AN ORGANIZED HEALTH CARE EDUCATION/TRAINING PROGRAM

## 2023-11-22 PROCEDURE — 1101F PT FALLS ASSESS-DOCD LE1/YR: CPT | Mod: CPTII,S$GLB,, | Performed by: STUDENT IN AN ORGANIZED HEALTH CARE EDUCATION/TRAINING PROGRAM

## 2023-11-22 RX ORDER — AMMONIUM LACTATE 12 G/100G
LOTION TOPICAL 2 TIMES DAILY
COMMUNITY
Start: 2023-11-02

## 2023-11-22 RX ADMIN — IOHEXOL 75 ML: 350 INJECTION, SOLUTION INTRAVENOUS at 04:11

## 2023-11-22 NOTE — PROGRESS NOTES
[facility]  FOLLOW-UP VISIT.     Reason for visit: Follow Up     Best Contact Phone Number(s): [phone] (home)      Cancer/Stage/TNM:    Cancer Staging   Prostate cancer  Staging form: Prostate, AJCC 8th Edition  - Clinical stage from 2023: Stage IIIB (cT3b, cN0, cM0, PSA: 15.5, Grade Group: 3) - Signed by Cholo Mortensen MD on 2023       Oncology History   Prostate cancer   2023 Initial Diagnosis    Prostate cancer: Patient's PSA previously normal until 15.48 (2023) -> 22.2 (2023).     10/3/23: MRI Prostate shows PIRADS 5 lesion 32 x 10 mm in medial and lateral aspects of peripheral zones with minimal invasion of the right seminal vesicles, no perineural or extracapsular extension visualized. 8 mm nodules visualized on left iliac bone  23: Prostate biopsy shows [place] 4+3=7, negative for perineural invasion, positive for LVI  23: PSMA PET shows activity confined to the prostate gland and seminal vesicles consistent with known malignancy, no lymph node metastases nor distant metastatic disease. Spiculated appearing lung nodule concerning for incidental bronchogenic carcinoma. No irregularity corresponding to iliac lesion on MRI.      2023 Cancer Staged    Staging form: Prostate, AJCC 8th Edition  - Clinical stage from 2023: Stage IIIB (cT3b, cN0, cM0, PSA: 15.5, Grade Group: 3)          Interval History: Frank Gay Jr. is a 68 y.o. male with prostate cancer who presents to [facility] care. Patient diagnosed with high risk prostate cancer based off of PSA and Viktoria score, PSMA PET shows known prostate disease as well as irregular uptake in lung parenchyma. Patient has a CT scan of lung after clinic visit .    Patient's father has history of prostate cancer, had it for 30 years and  in 80s. No family history of pancreatic / breast cancer, 1/2 sister with ovarian cancer sp complete hysterectomy with no recurrence.    Patient presents  to clinic with wife, Laisha.  ECOG Performance Status is 0.    ROS:  A complete 12-point review of systems was reviewed and is negative except as mentioned above.     Past Medical History:   Past Medical History:   Diagnosis Date    Arthritis     Cancer     PROSTATE    Elevated PSA     Gout, unspecified     Personal history of venous thrombosis and embolism     After trauma in the 70's, he had a DVT with a PE    Pulmonary embolism     Sleep apnea     RESOLVED WITH UPPP AND SEPTOPLASTY/ NO MACHINE        Allergies:   Review of patient's allergies indicates:   Allergen Reactions    Celebrex  [celecoxib] Blisters, Dermatitis, Edema, Hallucinations, Hives, Itching and Swelling    Dairy aid  [lactase]      Other reaction(s): stomach cramps  Other reaction(s): Itching  Other reaction(s): Diarrhea    Eliquis  [apixaban] Blisters, Edema, Hallucinations, Other (See Comments) and Swelling    Influenza a (h1n1) vac 09 (pf)     Lactose         Medications:   Current Outpatient Medications   Medication Sig Dispense Refill    ammonium lactate (LAC-HYDRIN) 12 % lotion Apply topically 2 (two) times daily.      LIDOcaine-TETRAcaine 7-7 % Crea APPLY 2 GRAMS TO THE AFFECTED AREAS 3-4 TIMES DAILY, 30 g 0    methylPREDNISolone (MEDROL DOSEPACK) 4 mg tablet use as directed 21 each 0    triamcinolone acetonide 0.1% (KENALOG) 0.1 % cream Apply topically 2 (two) times daily.       No current facility-administered medications for this visit.        Physical Exam:   /61 (BP Location: Right arm, Patient Position: Sitting, BP Method: Medium (Manual))   Pulse (!) 50   Temp 97.3 °F (36.3 °C) (Temporal)   Resp 15   Ht 6' (1.829 m)   Wt 97.5 kg (214 lb 15.2 oz)   SpO2 100%   BMI 29.15 kg/m²      Physical Exam  Constitutional:       Appearance: Normal appearance.   HENT:      Head: Normocephalic and atraumatic.      Mouth/Throat:      Mouth: Mucous membranes are moist.   Eyes:      Extraocular Movements: Extraocular  movements intact.      Pupils: Pupils are equal, round, and reactive to light.   Cardiovascular:      Rate and Rhythm: Normal rate and regular rhythm.      Pulses: Normal pulses.      Heart sounds: No murmur heard.  Pulmonary:      Effort: Pulmonary effort is normal. No respiratory distress.      Breath sounds: Normal breath sounds.   Abdominal:      General: Abdomen is flat. There is no distension.      Palpations: Abdomen is soft.      Tenderness: There is no abdominal tenderness.   Musculoskeletal:         General: No swelling or tenderness. Normal range of motion.      Cervical back: Normal range of motion. No rigidity.   Skin:     General: Skin is warm and dry.      Coloration: Skin is not jaundiced.   Neurological:      General: No focal deficit present.      Mental Status: He is alert and oriented to person, place, and time.   Psychiatric:         Mood and Affect: Mood normal.         Behavior: Behavior normal.         Labs:   Lab Results   Component Value Date    WBC 6.60 10/30/2023    HGB 13.2 (L) 10/30/2023    HCT 40.7 10/30/2023    MCV 89 10/30/2023     10/30/2023       Lab Results   Component Value Date     10/16/2023    K 4.3 10/16/2023     10/16/2023    CO2 28 10/16/2023    BUN 17 10/16/2023    CREATININE 0.8 10/16/2023    ALBUMIN 3.9 10/16/2023    BILITOT 0.4 10/16/2023    ALKPHOS 78 10/16/2023    AST 19 10/16/2023    ALT 16 10/16/2023       Imaging:   NM PET CT F 18 PYL PSMA, Midthigh to Vertex  Narrative: EXAMINATION:  NM F-18 Pylarify PET/CT    CLINICAL HISTORY:  Transrectal ultrasound-guided prostate biopsies 11/08/2023 performed secondary to rising PSA demonstrate adenocarcinoma Viktoria 4+3=7 and 3+4=7 in multiple cores.  For staging    TECHNIQUE:  Following IV administration of 9.7 millicuries of F 18 Pylarify in a left antecubital fossa vein, PET imaging was carried out from the proximal thighs through the vertex of the skull.  Low-dose noncontrast CT was obtained for  attenuation correction.  Images analyzed utilizing [employer] software    COMPARISON:  Prostate MRI 10/03/2023    FINDINGS:  Head/neck: No abnormal tracer activity is present.  Corresponding low-dose noncontrast CT transmission images demonstrate no significant findings.    Chest: No abnormal activity is present.    There is no mediastinal nor axillary adenopathy.    Minimal gynecomastia is present.  There is no pleural effusion.    There is a 10 mm irregularly shaped nodule in the superior segment of the right lower lobe on series 3, image 134.  Otherwise, the lungs are clear.    Abdomen/pelvis: No abnormal activity is present in the abdomen.  In the pelvis, there is markedly abnormal activity localizing to the prostate, more so to the left of midline.  The SUV max is 13.2 measured on series 3, image 308.  There is extension of abnormal activity into the seminal vesicles in the midline and slightly to the left of midline on series 3, image 303 with an SUV max of 16.3.  There is no abnormal activity in lymph nodes.    6 mm low-attenuation left lobe liver lesion noted series 3, image 184.  Otherwise, liver, spleen, adrenal glands, pancreas, gallbladder and kidneys are grossly unremarkable.    Aorta normal in caliber with scattered calcific plaque.    Urinary bladder unremarkable.  No abnormalities are seen in the bowel aside from a few colonic diverticula.  There is no ascites or adenopathy.    Bones: No abnormal activity is seen in the bones to indicate metastatic disease.  Specifically, the small focus of signal abnormality seen on 1 sequence of the MRI has no definite CT correlate and demonstrates no abnormal tracer uptake.    There are extensive degenerative changes in the spine and pelvis.  In addition, there are old, healed pelvic fractures.  Impression: Abnormal activity confined to the prostate gland and seminal vesicles, consistent with known malignancy.  No evidence of lymph node metastases nor distant metastatic  disease.    Spiculated appearing lung nodule is concerning for incidental bronchogenic carcinoma.    Electronically signed by: Marilee Bryant Juve  Date:    11/14/2023  Time:    16:49            Diagnoses:       1. Malignant neoplasm of unspecified part of unspecified bronchus or lung    2. Prostate CA    3. Lung nodule          Assessment and Plan:     #  Very High Risk Prostate Cancer - Patient has established care with surgical oncology and radiation oncology. PSMA PET with suspicious lung finding, reviewed today. Plan for CT Chest after clinic visit to complete staging prior to proceeding treatment plan. Provided handouts on  Lupron & Zytiga. Defer starting Casodex to after the holiday.       # Lung Nodule - No smoking history, no cough. PET 11/14/23 shows 10 mm nodule in the superior segment of the right lower lobe, no SUV documented.     # Osteoarthritis - Right elbow, follows with orthopedics      he will return to clinic in 1 week, but knows to call in the interim if symptoms change or should a problem arise.    Monica Carnes MD  Hematology/Oncology  [facility]      Med Onc Chart Routing      Follow up with physician 1 week. 1w Deyvi VV   Follow up with CAROLINE    Infusion scheduling note    Injection scheduling note    Labs   Scheduling:  Preferred lab:  Lab interval:  Labs today (cbc, cmp, cr, psa)   Imaging CT chest abdomen pelvis   ct chest abd pelvis at 3p today   Pharmacy appointment    Other referrals              Answers submitted by the patient for this visit:  Review of Systems Questionnaire (Submitted on 11/17/2023)  appetite change : No  unexpected weight change: No  mouth sores: No  visual disturbance: No  cough: No  shortness of breath: No  chest pain: No  abdominal pain: No  diarrhea: No  frequency: No  back pain: No  rash: No  headaches: No  adenopathy: No  nervous/ anxious: No

## 2023-11-24 ENCOUNTER — TELEPHONE (OUTPATIENT)
Dept: GENETICS | Facility: CLINIC | Age: 68
End: 2023-11-24
Payer: COMMERCIAL

## 2023-11-27 ENCOUNTER — DOCUMENTATION ONLY (OUTPATIENT)
Dept: HEMATOLOGY/ONCOLOGY | Facility: CLINIC | Age: 68
End: 2023-11-27
Payer: COMMERCIAL

## 2023-11-27 NOTE — PROGRESS NOTES
Spoke with patient to determine if he wishes to go to [facility] or stay in [place] for Pulmonary.  Patient stated since it was tomorrow he would just go to [facility].  Informed MATTHIAS Montoya that patient will proceed with [facility].  Verbalized understanding by all.

## 2023-11-28 ENCOUNTER — OFFICE VISIT (OUTPATIENT)
Dept: PULMONOLOGY | Facility: CLINIC | Age: 68
End: 2023-11-28
Payer: COMMERCIAL

## 2023-11-28 VITALS
HEART RATE: 49 BPM | WEIGHT: 213.88 LBS | RESPIRATION RATE: 18 BRPM | OXYGEN SATURATION: 99 % | DIASTOLIC BLOOD PRESSURE: 70 MMHG | BODY MASS INDEX: 28.97 KG/M2 | HEIGHT: 72 IN | SYSTOLIC BLOOD PRESSURE: 152 MMHG

## 2023-11-28 DIAGNOSIS — R91.1 LUNG NODULE: ICD-10-CM

## 2023-11-28 DIAGNOSIS — R91.1 SOLITARY PULMONARY NODULE: Primary | ICD-10-CM

## 2023-11-28 DIAGNOSIS — C61 PROSTATE CA: ICD-10-CM

## 2023-11-28 PROCEDURE — 99999 PR PBB SHADOW E&M-EST. PATIENT-LVL IV: ICD-10-PCS | Mod: PBBFAC,,, | Performed by: INTERNAL MEDICINE

## 2023-11-28 PROCEDURE — 1101F PR PT FALLS ASSESS DOC 0-1 FALLS W/OUT INJ PAST YR: ICD-10-PCS | Mod: CPTII,S$GLB,, | Performed by: INTERNAL MEDICINE

## 2023-11-28 PROCEDURE — 1160F PR REVIEW ALL MEDS BY PRESCRIBER/CLIN PHARMACIST DOCUMENTED: ICD-10-PCS | Mod: CPTII,S$GLB,, | Performed by: INTERNAL MEDICINE

## 2023-11-28 PROCEDURE — 1159F PR MEDICATION LIST DOCUMENTED IN MEDICAL RECORD: ICD-10-PCS | Mod: CPTII,S$GLB,, | Performed by: INTERNAL MEDICINE

## 2023-11-28 PROCEDURE — 99999 PR PBB SHADOW E&M-EST. PATIENT-LVL IV: CPT | Mod: PBBFAC,,, | Performed by: INTERNAL MEDICINE

## 2023-11-28 PROCEDURE — 3008F PR BODY MASS INDEX (BMI) DOCUMENTED: ICD-10-PCS | Mod: CPTII,S$GLB,, | Performed by: INTERNAL MEDICINE

## 2023-11-28 PROCEDURE — 3078F PR MOST RECENT DIASTOLIC BLOOD PRESSURE < 80 MM HG: ICD-10-PCS | Mod: CPTII,S$GLB,, | Performed by: INTERNAL MEDICINE

## 2023-11-28 PROCEDURE — 99204 OFFICE O/P NEW MOD 45 MIN: CPT | Mod: S$GLB,,, | Performed by: INTERNAL MEDICINE

## 2023-11-28 PROCEDURE — 3044F HG A1C LEVEL LT 7.0%: CPT | Mod: CPTII,S$GLB,, | Performed by: INTERNAL MEDICINE

## 2023-11-28 PROCEDURE — 3288F PR FALLS RISK ASSESSMENT DOCUMENTED: ICD-10-PCS | Mod: CPTII,S$GLB,, | Performed by: INTERNAL MEDICINE

## 2023-11-28 PROCEDURE — 1160F RVW MEDS BY RX/DR IN RCRD: CPT | Mod: CPTII,S$GLB,, | Performed by: INTERNAL MEDICINE

## 2023-11-28 PROCEDURE — 3008F BODY MASS INDEX DOCD: CPT | Mod: CPTII,S$GLB,, | Performed by: INTERNAL MEDICINE

## 2023-11-28 PROCEDURE — 3288F FALL RISK ASSESSMENT DOCD: CPT | Mod: CPTII,S$GLB,, | Performed by: INTERNAL MEDICINE

## 2023-11-28 PROCEDURE — 3044F PR MOST RECENT HEMOGLOBIN A1C LEVEL <7.0%: ICD-10-PCS | Mod: CPTII,S$GLB,, | Performed by: INTERNAL MEDICINE

## 2023-11-28 PROCEDURE — 99204 PR OFFICE/OUTPT VISIT, NEW, LEVL IV, 45-59 MIN: ICD-10-PCS | Mod: S$GLB,,, | Performed by: INTERNAL MEDICINE

## 2023-11-28 PROCEDURE — 1101F PT FALLS ASSESS-DOCD LE1/YR: CPT | Mod: CPTII,S$GLB,, | Performed by: INTERNAL MEDICINE

## 2023-11-28 PROCEDURE — 3078F DIAST BP <80 MM HG: CPT | Mod: CPTII,S$GLB,, | Performed by: INTERNAL MEDICINE

## 2023-11-28 PROCEDURE — 3077F SYST BP >= 140 MM HG: CPT | Mod: CPTII,S$GLB,, | Performed by: INTERNAL MEDICINE

## 2023-11-28 PROCEDURE — 1159F MED LIST DOCD IN RCRD: CPT | Mod: CPTII,S$GLB,, | Performed by: INTERNAL MEDICINE

## 2023-11-28 PROCEDURE — 3077F PR MOST RECENT SYSTOLIC BLOOD PRESSURE >= 140 MM HG: ICD-10-PCS | Mod: CPTII,S$GLB,, | Performed by: INTERNAL MEDICINE

## 2023-11-28 NOTE — PROGRESS NOTES
Subjective:      Patient ID: Frank Gay Jr. is a 68 y.o. male.    Chief Complaint: Pulmonary Nodules    Pulmonary Nodules        68-year-old male previously healthy who recently underwent a prostate MRI, subsequent biopsy and PET with newly diagnosed stage III carcinoma of the prostate.  He was evaluated by Urology and Heme-Onc as well as Rad Onc and is opting for medical rather than surgical treatment.  His PET imaging demonstrated some lung nodules which are further characterized by dedicated CT of the chest which showed scattered bilateral nodules with the largest being right lower lobe superior segment approximately 1 cm and then a lingular density of about 9 mm.  He is here today for that reason.  He is with his wife.  He is no pulmonary complaints or symptoms.  He is a never smoker but did work as a  for many years.  Imaging did not demonstrate any suspicious lesions between the prostate and the lung    Past Medical History:   Diagnosis Date    Arthritis     Cancer     PROSTATE    Elevated PSA     Gout, unspecified     Personal history of venous thrombosis and embolism     After trauma in the 70's, he had a DVT with a PE    Pulmonary embolism     Sleep apnea     RESOLVED WITH UPPP AND SEPTOPLASTY/ NO MACHINE     Past Surgical History:   Procedure Laterality Date    ADENOIDECTOMY      APPENDECTOMY      BIOPSY, PROSTATE, USING PROSTATE MAPPING N/A 11/8/2023    Procedure: BIOPSY, PROSTATE, USING PROSTATE MAPPING;  Surgeon: Juan Calhoun MD;  Location: [facility];  Service: Urology;  Laterality: N/A;    ELBOW SURGERY  05/2016    EYE SURGERY      Eyelid    JOINT REPLACEMENT Left 2013    KNEE    KNEE SURGERY      left knee    periodontal  03/2018    PROSTATE BIOPSY  2018    TONSILLECTOMY      TOTAL KNEE ARTHROPLASTY Right 12/18/2019    Procedure: ARTHROPLASTY, KNEE, TOTAL-DEPUY-SIGMA;  Surgeon: Ken Amezcua III, MD;  Location: [facility];  Service: Orthopedics;  Laterality: Right;     UVULOPALATOPHARYNGOPLASTY      AND SEPTOPLASTY     Social History     Tobacco Use    Smoking status: Never    Smokeless tobacco: Former     Quit date: 7/1/2017   Substance Use Topics    Alcohol use: No    Drug use: No     Family History   Problem Relation Age of Onset    Heart disease Mother         Age 93 Y    Prostate cancer Father     Cancer Father         prostate cancer         Review of Systems as per hPI otherwise negative    Objective:     Physical Exam   Constitutional: He is oriented to person, place, and time. He appears well-developed. No distress.   HENT:   Head: Normocephalic.   Cardiovascular: Normal rate and regular rhythm.   Pulmonary/Chest: Normal expansion, symmetric chest wall expansion, effort normal and breath sounds normal.   Abdominal: Soft.   Musculoskeletal:      Cervical back: Neck supple.   Neurological: He is alert and oriented to person, place, and time.   Psychiatric: He has a normal mood and affect.   Nursing note and vitals reviewed.          11/28/2023    12:43 PM 11/22/2023     1:39 PM 11/16/2023     1:14 PM 11/16/2023     1:10 PM 11/8/2023     9:05 AM 11/8/2023     8:10 AM 11/8/2023     8:05 AM   Pulmonary Function Tests   SpO2 99 % 100 % 99 % 99 % 100 % 99 % 97 %   Height 6' (1.829 m) 6' (1.829 m) 6' (1.829 m)       Weight 97 kg (213 lb 13.5 oz) 97.5 kg (214 lb 15.2 oz) 96.8 kg (213 lb 6.5 oz) 96.8 kg (213 lb 6.5 oz) 97.5 kg (215 lb)     BMI (Calculated) 29 29.1 28.9  29.2          Assessment:     1. Solitary pulmonary nodule    2. Prostate CA    3. Lung nodule         Orders Placed This Encounter   Procedures    CT Chest Without Contrast     Standing Status:   Future     Standing Expiration Date:   11/28/2024     Order Specific Question:   May the Radiologist modify the order per protocol to meet the clinical needs of the patient?     Answer:   Yes   I personally reviewed chest CT images from November 2023.  I agree with the radiologist's interpretation as below    Impression:      1. Patient with a known history of prostate carcinoma with multiple solid pulmonary nodules scattered within the chest, the largest of which is irregular and spiculated measuring 12 x 9 x 10 mm in the superior segment of the right lower lobe posteriorly.  There is abutment of the pleural surface.  No lymphadenopathy noted in the chest or axilla.  A neoplastic process such as bronchogenic carcinoma and/or pulmonary metastatic disease cannot be excluded (despite the lack of demonstrated FDG avidity the time of the recent PET CT scan).  Strong consideration should be given to performing percutaneous biopsy.  2. 4 mm hepatic hypodensities, statistically most likely cysts but too small to definitively characterize.      Plan:     Bilateral lung nodules of unknown significance  With no intervening disease demonstrated in the abdomen or pelvis, pulmonary metastases from prostate are highly unlikely  Given his never smoking status, malignancy is unlikely  With no prior imaging, it is unknown how long these lesions have been there  Given that he is not opting for surgical treatment of his prostate disease, I am less inclined to obtain tissue sampling  My recommendation at present is to repeat his imaging in 3 months' time.  Should any of these lesions Haven an untoward fashion, would likely request CT-guided aspirate at that time.  I discussed this with the patient as well as Dr. Carnes of Medical Oncology who were in agreement  I will see him back in 3 months with a CT, sooner if needed

## 2023-11-28 NOTE — PROGRESS NOTES
[facility]  FOLLOW-UP VISIT.     Reason for visit: Follow Up     Best Contact Phone Number(s): [phone] (home)      Cancer/Stage/TNM:    Cancer Staging   Very High Risk Prostate Cancer  Staging form: Prostate, AJCC 8th Edition  - Clinical stage from 11/16/2023: Stage IIIB (cT3b, cN0, cM0, PSA: 15.5, Grade Group: 3) - Signed by Cholo Mortensen MD on 11/16/2023       Oncology History   Very High Risk Prostate Cancer   11/16/2023 Initial Diagnosis    Prostate cancer: Patient's PSA previously normal until 15.48 (7/2023) -> 22.2 (9/2023).     10/3/23: MRI Prostate shows PIRADS 5 lesion 32 x 10 mm in medial and lateral aspects of peripheral zones with minimal invasion of the right seminal vesicles, no perineural or extracapsular extension visualized. 8 mm nodules visualized on left iliac bone  11/8/23: Prostate biopsy shows [place] 4+3=7, negative for perineural invasion, positive for LVI  11/14/23: PSMA PET shows activity confined to the prostate gland and seminal vesicles consistent with known malignancy, no lymph node metastases nor distant metastatic disease. Spiculated appearing lung nodule concerning for incidental bronchogenic carcinoma. No irregularity corresponding to iliac lesion on MRI.      11/16/2023 Cancer Staged    Staging form: Prostate, AJCC 8th Edition  - Clinical stage from 11/16/2023: Stage IIIB (cT3b, cN0, cM0, PSA: 15.5, Grade Group: 3)          Interval History: Frank Gay Jr. is a 68 y.o. male with very high risk prostate cancer who presents for follow up. Since last visit patient established care with pulmonologist for lesions seen on PSMA with plans for observation. Patient endorses no new symptoms. Celebrated Thanksgiving with no new health concerns.    Patient presents to clinic alone.  ECOG Performance Status is 0.    ROS:  A complete 12-point review of systems was reviewed and is negative except as mentioned above.     Past Medical History:   Past Medical  History:   Diagnosis Date    Arthritis     Cancer     PROSTATE    Elevated PSA     Gout, unspecified     Personal history of venous thrombosis and embolism     After trauma in the 70's, he had a DVT with a PE    Pulmonary embolism     Sleep apnea     RESOLVED WITH UPPP AND SEPTOPLASTY/ NO MACHINE        Allergies:   Review of patient's allergies indicates:   Allergen Reactions    Celebrex  [celecoxib] Blisters, Dermatitis, Edema, Hallucinations, Hives, Itching and Swelling    Dairy aid  [lactase]      Other reaction(s): stomach cramps  Other reaction(s): Itching  Other reaction(s): Diarrhea    Eliquis  [apixaban] Blisters, Edema, Hallucinations, Other (See Comments) and Swelling    Influenza a (h1n1) vac 09 (pf)     Lactose         Medications:   Current Outpatient Medications   Medication Sig Dispense Refill    abiraterone (ZYTIGA) 250 mg Tab Take 1 tablet (250 mg total) by mouth once daily. 30 tablet 11    ammonium lactate (LAC-HYDRIN) 12 % lotion Apply topically 2 (two) times daily.      bicalutamide (CASODEX) 50 MG Tab Take 1 tablet (50 mg total) by mouth once daily Start taking now to lower testosterone. 30 tablet 0    LIDOcaine-TETRAcaine 7-7 % Crea APPLY 2 GRAMS TO THE AFFECTED AREAS 3-4 TIMES DAILY, 30 g 0    predniSONE (DELTASONE) 5 MG tablet Take 1 tablet (5 mg total) by mouth once daily. Start with Abiraterone 30 tablet 3    triamcinolone acetonide 0.1% (KENALOG) 0.1 % cream Apply topically 2 (two) times daily.       No current facility-administered medications for this visit.        Physical Exam:   Virtual Visit     Labs:   Lab Results   Component Value Date    WBC 8.48 11/22/2023    HGB 12.6 (L) 11/22/2023    HCT 37.7 (L) 11/22/2023    MCV 87 11/22/2023     11/22/2023       Lab Results   Component Value Date     11/22/2023    K 3.9 11/22/2023     11/22/2023    CO2 30 (H) 11/22/2023    BUN 17 11/22/2023    CREATININE 0.8 11/22/2023    CREATININE 0.8 11/22/2023    ALBUMIN 3.3 (L)  11/22/2023    BILITOT 0.3 11/22/2023    ALKPHOS 69 11/22/2023    AST 14 11/22/2023    ALT 12 11/22/2023       Imaging:   CT Chest With Contrast  Narrative: EXAMINATION:  CT CHEST WITH CONTRAST    CLINICAL HISTORY:  Non-small cell lung cancer (NSCLC), staging; Malignant neoplasm of unspecified part of unspecified bronchus or lung    TECHNIQUE:  Following the intravenous administration of 75 cc of Omnipaque 350 contrast material, 1.25 mm low dose contiguous axial images were acquired through the chest.  Subsequently, 2 dimensional coronal and sagittal reconstructed images were generated from the source data.  Axial MIP images of the lungs were also generated.    COMPARISON:  PET-CT scan-11/14/2023    FINDINGS:  Soft tissues at the base of the neck:Unremarkable.    Heart and great vessels: The heart is normal in size without a significant volume of pericardial fluid.  No thoracic aortic aneurysm. There is atherosclerotic calcification present within the thoracic aortic arch and innominate artery..    Lymph nodes: No pathologically enlarged lymph nodes in the chest or axilla.    Airways: Unremarkable.    Lungs: There is a 12 x 9 x 10 mm spiculated solid irregular nodule observed in the superior segment right lower lobe posteriorly on series 4, image 188 and series 602, image 192.  There are linear radiations extending from the posterior margin of the nodule to the pleural surface.  There are a few additional scattered subcentimeter solid nodules present elsewhere within the chest, the largest of which measure 8 mm in the right upper lobe abutting the interlobar fissure on series 4, image 273, 4 mm in the right middle lobe on series 4, image 327, and 6 mm in the left lower lobe abutting the left hemidiaphragm on series 4 image 367.No emphysematous lung architecture. No bronchiectasis.    Pleura: No significant volume of pleural fluid or pneumothorax.    Upper abdominal soft tissues: There are 2 tiny 4 mm hypodensities  present within the liver, 1 within the lateral segment of the left hepatic lobe on series 2, image 110, and the other within the right hepatic lobe on series 2, image 105, both unchanged.  There is a moderate volume of stool in the colon.  Please correlate for symptoms of constipation.  No adrenal masses noted..    Bones: There is degenerative change of the thoracic spine.  No acute thoracic compression fracture.  No imaging evidence of lytic or blastic osseous metastatic disease.  Impression: 1. Patient with a known history of prostate carcinoma with multiple solid pulmonary nodules scattered within the chest, the largest of which is irregular and spiculated measuring 12 x 9 x 10 mm in the superior segment of the right lower lobe posteriorly.  There is abutment of the pleural surface.  No lymphadenopathy noted in the chest or axilla.  A neoplastic process such as bronchogenic carcinoma and/or pulmonary metastatic disease cannot be excluded (despite the lack of demonstrated FDG avidity the time of the recent PET CT scan).  Strong consideration should be given to performing percutaneous biopsy.  2. 4 mm hepatic hypodensities, statistically most likely cysts but too small to definitively characterize.    Electronically signed by: Anival Powell MD  Date:    11/22/2023  Time:    16:57            Diagnoses:       1. Prostate cancer    2. Pulmonary nodule    3. Elbow arthritis    4. Bradycardia with 41-50 beats per minute          Assessment and Plan:     #  Very High Risk Prostate Cancer, Stage IIIB ([id number]) - Based off of cT3b patient has very high risk disease. PSMA PET scan shows bronchogenic lung lesions and CT Chest shows several pleural based lesions. After pulmonary evaluation, opt to follow with surveillance and considered less likely malignancy without other sites of metastases and appearance is inconsistent with primary lung malignancy. As such, plan to treat as localized disease with ADT + XRT, and patient  meets high risk criteria per [place] for the addition of Abiraterone.     Prescription for Casodex to pharmacy with plans to return to clinic to initiate Lupron same day as radiation oncology visit. We discussed typical side effects of hormone therapy including fatigue, weight gain, loss libido, hot flashes, and muscle loss. Other longer term risks include cardiovascular disease and osteoporosis.    - Casodex now, return 12/15 same day as Dr. Mancilla + Lupron  - DEXA ordered, encourage 4 servings of calcium daily + vit D  - recommend genetic counselor  - Zytiga to specialty pharmacy + prednisone to pharmacy      # Lung Nodule - No smoking history, no cough, does have high risk work exposure. Established care with Dr. Mas and will continue with active surveillance.      # Osteoarthritis - Right elbow, follows with orthopedics    # Sinus Bradycardia - Follows with Dr. Mccann       he will return to clinic in 2 weeks, but knows to call in the interim if symptoms change or should a problem arise.    Monica Carnes MD  Hematology/Oncology  [facility] [place]      Med Onc Chart Routing      Follow up with physician . 12/15 at 930a   Follow up with CAROLINE    Infusion scheduling note    Injection scheduling note    Labs CMP, CBC and PSA   Scheduling:  Preferred lab:  Lab interval:  lab apt prior to clinic visit   Imaging    Pharmacy appointment    Other referrals                  The patient location is: home  The chief complaint leading to consultation is: prostate cancer    Visit type: audiovisual    Face to Face time with patient: 30   45 minutes of total time spent on the encounter, which includes face to face time and non-face to face time preparing to see the patient (eg, review of tests), Obtaining and/or reviewing separately obtained history, Documenting clinical information in the electronic or other health record, Independently interpreting results (not separately reported) and communicating  results to the patient/family/caregiver, or Care coordination (not separately reported).         Each patient to whom he or she provides medical services by telemedicine is:  (1) informed of the relationship between the physician and patient and the respective role of any other health care provider with respect to management of the patient; and (2) notified that he or she may decline to receive medical services by telemedicine and may withdraw from such care at any time.    Notes:       Answers submitted by the patient for this visit:  Review of Systems Questionnaire (Submitted on 11/27/2023)  appetite change : No  unexpected weight change: No  mouth sores: No  visual disturbance: No  cough: No  shortness of breath: No  chest pain: No  abdominal pain: No  diarrhea: No  frequency: No  back pain: No  rash: No  headaches: No  adenopathy: No  nervous/ anxious: No

## 2023-11-29 ENCOUNTER — OFFICE VISIT (OUTPATIENT)
Dept: HEMATOLOGY/ONCOLOGY | Facility: CLINIC | Age: 68
End: 2023-11-29
Payer: COMMERCIAL

## 2023-11-29 DIAGNOSIS — C61 PROSTATE CANCER: Primary | ICD-10-CM

## 2023-11-29 DIAGNOSIS — M19.029 ELBOW ARTHRITIS: ICD-10-CM

## 2023-11-29 DIAGNOSIS — R00.1 BRADYCARDIA WITH 41-50 BEATS PER MINUTE: ICD-10-CM

## 2023-11-29 DIAGNOSIS — R91.1 PULMONARY NODULE: ICD-10-CM

## 2023-11-29 PROCEDURE — 99215 OFFICE O/P EST HI 40 MIN: CPT | Mod: 95,,, | Performed by: STUDENT IN AN ORGANIZED HEALTH CARE EDUCATION/TRAINING PROGRAM

## 2023-11-29 PROCEDURE — 3044F PR MOST RECENT HEMOGLOBIN A1C LEVEL <7.0%: ICD-10-PCS | Mod: CPTII,95,, | Performed by: STUDENT IN AN ORGANIZED HEALTH CARE EDUCATION/TRAINING PROGRAM

## 2023-11-29 PROCEDURE — 3044F HG A1C LEVEL LT 7.0%: CPT | Mod: CPTII,95,, | Performed by: STUDENT IN AN ORGANIZED HEALTH CARE EDUCATION/TRAINING PROGRAM

## 2023-11-29 PROCEDURE — 99215 PR OFFICE/OUTPT VISIT, EST, LEVL V, 40-54 MIN: ICD-10-PCS | Mod: 95,,, | Performed by: STUDENT IN AN ORGANIZED HEALTH CARE EDUCATION/TRAINING PROGRAM

## 2023-11-29 RX ORDER — BICALUTAMIDE 50 MG/1
50 TABLET, FILM COATED ORAL DAILY
Qty: 30 TABLET | Refills: 0 | Status: SHIPPED | OUTPATIENT
Start: 2023-11-29 | End: 2023-11-30

## 2023-11-29 RX ORDER — PREDNISONE 5 MG/1
5 TABLET ORAL DAILY
Qty: 30 TABLET | Refills: 3 | Status: SHIPPED | OUTPATIENT
Start: 2023-11-29 | End: 2024-03-13 | Stop reason: SDUPTHER

## 2023-11-29 RX ORDER — ABIRATERONE ACETATE 250 MG/1
250 TABLET ORAL DAILY
Qty: 30 TABLET | Refills: 11 | Status: ACTIVE | OUTPATIENT
Start: 2023-11-29 | End: 2024-11-28

## 2023-11-30 ENCOUNTER — OFFICE VISIT (OUTPATIENT)
Dept: GENETICS | Facility: CLINIC | Age: 68
End: 2023-11-30
Payer: COMMERCIAL

## 2023-11-30 DIAGNOSIS — C61 PROSTATE CA: ICD-10-CM

## 2023-11-30 DIAGNOSIS — C61 PROSTATE CANCER: ICD-10-CM

## 2023-11-30 DIAGNOSIS — Z80.42 FAMILY HISTORY OF PROSTATE CANCER: Primary | ICD-10-CM

## 2023-11-30 DIAGNOSIS — Z80.41 FAMILY HISTORY OF OVARIAN CANCER: ICD-10-CM

## 2023-11-30 DIAGNOSIS — C34.90 MALIGNANT NEOPLASM OF UNSPECIFIED PART OF UNSPECIFIED BRONCHUS OR LUNG: ICD-10-CM

## 2023-11-30 PROCEDURE — 99499 NO LOS: ICD-10-PCS | Mod: 95,,, | Performed by: INTERNAL MEDICINE

## 2023-11-30 PROCEDURE — 99499 UNLISTED E&M SERVICE: CPT | Mod: 95,,, | Performed by: INTERNAL MEDICINE

## 2023-11-30 RX ORDER — BICALUTAMIDE 50 MG/1
TABLET, FILM COATED ORAL
Qty: 15 TABLET | Refills: 0 | Status: SHIPPED | OUTPATIENT
Start: 2023-11-30 | End: 2024-01-30 | Stop reason: ALTCHOICE

## 2023-11-30 NOTE — PROGRESS NOTES
"  Cancer Genetics  Hereditary/High Risk Clinic  Department of Hematology and Oncology  [facility]        Date of Service:  2023  Provider:  Tulio Gordillo MS, [facility]    Patient Information  Name:  Frank Gay Jr.  :  1955  MRN:  [id number]        Referring Provider: Monica Carnes MD     Televisit Information  The patient location is: [place], [place].  The chief complaint leading to consultation is: as below.  Visit type: audiovisual.  Face-to-face time with patient: approximately 32 minutes.  Approximately 85 minutes in total were spent on this encounter, which includes face-to-face time and non-face-to-face time preparing to see the patient (e.g., review of tests), obtaining and/or reviewing separately obtained history, documenting clinical information in the electronic or other health record, independently interpreting results (not separately reported) and communicating results to the patient/family/caregiver, or care coordination (not separately reported).  Each patient to whom he or she provides medical services by telemedicine is:  (1) informed of the relationship between the physician and patient and the respective role of any other health care provider with respect to management of the patient; and (2) notified that he or she may decline to receive medical services by telemedicine and may withdraw from such care at any time.      SUBJECTIVE:      Chief Complaint: Genetic Counseling    History of Present Illness (HPI):  Frank Gay Jr. ("Frank"), 68 y.o., assigned male sex at birth is established with the [facility] Department of Hematology and Oncology but new to me.  He was referred by Medical Oncology for genetic cancer risk assessment given his personal history of prostate cancer. At age 68, he was diagnosed with adenocarcinoma of the prostate ([place] 7).    Focused Medical History:   Germline cancer-genetic testing:  No  Cancer:  Yes  Prostate cancer (2023)  Skin cancer " (BCC vs. Melanoma?) on eyelid (20+ years ago)  Colonoscopy: No  Other benign tumor:  Yes  Bilateral lung nodules (11/14/2023)   Pancreatitis:  No  Pancreatic cyst:  No    Focused Surgical History  Skin cancer removal (20+ years ago)    Ancestry:  Ashkenazi Yazidi ancestry:  No  Paternal: Italian  Maternal: Citizen of [place], English    Focused Family History:  Consanguinity in ancestors:  No  Germline cancer-genetic testing in blood relatives:  No  Cancer in family:  Yes; there are no known blood relatives affected with cancer other than those mentioned in the pedigree below    Family Cancer Pedigree:         Review of Systems:  See HPI.        SUBJECTIVE:   Records Reviewed  Medical History  Problem List  Any pertinent, available Procedures and Pathology reports in both [facility] and [facility]    COUNSELING   Causes of cancer  Germline cancer genetic testing is the testing of genes associated with cancer, known as cancer susceptibility genes.  Just as these genes are inherited from parents, mutations in these genes can be inherited, as well.  A mutation in a cancer susceptibility gene adversely affects the gene's ability to prevent cancer; therefore, carriers of cancer susceptibility gene mutations may be at increased risk for certain cancers.     Only 5-10% cancers are caused by a cancer susceptibility gene mutation, meaning the cancer is genetic/hereditary; rather, most cancers are sporadic.  Causes of sporadic cancers may include environmental risk factors, lifestyle risk factors, and non-modifiable risk factors.  It is important to note that members of a family often share not only their genetics but also risk factors including environmental and lifestyle risk factors, so cancers can be familial.    Mutations in BRCA1 and BRCA2 are associated with an increased risk for breast and ovarian cancer, in addition to male breast cancer, pancreatic, melanoma, and prostate cancer. Mutations in other genes may  increase the risk of breast and prostate cancer, in addition to other cancers.     Potential results of genetic testing, and their implications  Potential results of genetic testing include positive, negative, and variant of unknown significance (VUS).    A positive result indicates the presence of at least one clinically significant mutation, and the patient's associated cancer risks vary depending upon the cancer susceptibility gene(s) in which there is/are a mutation(s).  With a positive result, in some cases, depending upon the specific result and the patient's clinical history, modified risk management may be recommended, including measures for risk reduction and/or surveillance; however, modified management is not always an option.    A negative result indicates that no clinically significant mutations were identified in the gene(s) tested.    A VUS indicates that there is not presently enough data for the laboratory to make a determination as to whether the variant is clinically significant.  VUSs are not typically acted upon clinically.       The ability to interpret the meaning of a negative genetic testing result in genes associated with cancer with which the patient has not personally been affected, when done prior to testing the appropriate affected relative(s), is significantly limited.  A negative result in the patient does not indicate that she cannot develop cancer, and, in fact, the patient may even be at increased risk for cancer based on shared risk factors with affected relatives.  The most informative candidates for initial genetic testing in a family are those who have been affected with cancer.     Modified management may also be recommended, even with a result of no or unknown significance, based upon risk assessment that incorporates the family history.  Sometimes, depending upon the genetic testing result and the cancer diagnosis, additional/modified treatments may be an option, though this  is not guaranteed.     Genetic mutation inheritance  If  Frank tests positive for a mutation, his first-degree relatives would each have a 50% chance of having the same mutation, and other, more distantly related blood-relatives would also be at risk of having the same mutation.       Genetic discrimination  The Genetic Information Nondiscrimination Act (KIMMY) is U[place]S. federal legislation that provides some protections against use of an individual's genetic information by their health insurer and by their employer.  Title I of KIMMY prohibits most health insurers from utilizing an individual's genetic information to make decisions regarding insurance eligibility or premium charges.  Title II of KIMMY prohibits covered entities, including employers, from requesting the genetic information of employees and applicants.  KIMMY does not protect individuals from genetic discrimination toward health insurance obtained through a job with the  or through the Federal Employees Health Benefits Plan; from genetic discrimination by employers with fewer than 15 employees or if employed by the ; or from genetic discrimination by any other type of policies/entities, including but not limited to life insurance, disability insurance, long-term care insurance,  benefits, and  Health Services benefits.     Genetic testing logistics  An outside laboratory would perform the testing after a blood sample is collected at [facility] or a saliva sample is collected by the patient at home.  With genetic testing, there is a potential for the patient to incur out-of-pocket costs.  Results can take 2-3 weeks.  Post-test genetic counseling can be conducted once the genetic testing results are available.     Assessment/Plan  Based on the information provided by  Frank, she meets current criteria for genetic testing of hereditary breast and ovarian cancer syndrome according to current clinical guidelines. Frank's clinical  history, including personal history and/or family history, is suggestive of a hereditary cancer syndrome in the family given the personal history of prostate cancer and family history of ovarian cancer in a 2 degree relative (maternal half-sister).     Offered germline cancer-genetic testing at this time versus deferring testing at this time or declining testing altogether.  Various test panel options were discussed. Frank decided to proceed with genetic testing through the 2-gene BRCA1/BRCA2 Core Panel and the 70-gene Multi-Cancer Panel (AIP, ALK, APC, RADHA, AXIN2, BAP1, BARD1, BLM, BMPR1A, BRCA1, BRCA2, BRIP1, CDC73, CDH1, CDK4, CDKN1B, CDKN2A, CHEK2, CTNNA1, DICER1, EGFR, EPCAM, FH, FLCN, GREM1, HOXB13, KIT, LZTR1, MAX, MBD4, MEN1, MET, MITF, MLH1, MSH2, MSH3, MSH6, MUTYH, NF1, NF2, NTHL1, PALB2, PDGFRA, PMS2, POLD1, POLE, POT1, XLNLJ7U, PTCH1, PTEN, RAD51C, RAD51D, RB1, RET, SDHA, SDHAF2, SDHB, SDHC, SDHD, SMAD4, SMARCA4, SMARCB1, SMARCE1, STK11, SUFU, GMTV555, TP53, TSC1, TSC2, VHL).  Frank has provided his informed consent to proceed. A saliva kit will be mailed.     Questions were encouraged and answered to the patient's satisfaction, and he verbalized understanding of information and agreement with the plan.         Signed,    Tulio Gordillo MS, [facility]  Certified Genetic Counselor, Hereditary and High-Risk Clinic  Hematology/Oncology, [facility]    11/30/2023

## 2023-12-05 ENCOUNTER — OFFICE VISIT (OUTPATIENT)
Dept: CARDIOLOGY | Facility: CLINIC | Age: 68
End: 2023-12-05
Attending: INTERNAL MEDICINE
Payer: COMMERCIAL

## 2023-12-05 VITALS
WEIGHT: 214.06 LBS | BODY MASS INDEX: 28.99 KG/M2 | HEIGHT: 72 IN | DIASTOLIC BLOOD PRESSURE: 60 MMHG | HEART RATE: 48 BPM | SYSTOLIC BLOOD PRESSURE: 111 MMHG

## 2023-12-05 DIAGNOSIS — G47.33 OBSTRUCTIVE SLEEP APNEA: Chronic | ICD-10-CM

## 2023-12-05 DIAGNOSIS — Z86.718 PERSONAL HISTORY OF VENOUS THROMBOSIS AND EMBOLISM: ICD-10-CM

## 2023-12-05 DIAGNOSIS — R97.20 ELEVATED PSA: ICD-10-CM

## 2023-12-05 DIAGNOSIS — R00.1 BRADYCARDIA WITH 41-50 BEATS PER MINUTE: ICD-10-CM

## 2023-12-05 DIAGNOSIS — D50.8 OTHER IRON DEFICIENCY ANEMIA: ICD-10-CM

## 2023-12-05 DIAGNOSIS — E78.2 MIXED HYPERLIPIDEMIA: Primary | ICD-10-CM

## 2023-12-05 DIAGNOSIS — Z79.02 LONG TERM CURRENT USE OF ANTITHROMBOTICS/ANTIPLATELETS: ICD-10-CM

## 2023-12-05 PROCEDURE — 3288F PR FALLS RISK ASSESSMENT DOCUMENTED: ICD-10-PCS | Mod: CPTII,S$GLB,, | Performed by: INTERNAL MEDICINE

## 2023-12-05 PROCEDURE — 99214 PR OFFICE/OUTPT VISIT, EST, LEVL IV, 30-39 MIN: ICD-10-PCS | Mod: S$GLB,,, | Performed by: INTERNAL MEDICINE

## 2023-12-05 PROCEDURE — 3074F SYST BP LT 130 MM HG: CPT | Mod: CPTII,S$GLB,, | Performed by: INTERNAL MEDICINE

## 2023-12-05 PROCEDURE — 1159F PR MEDICATION LIST DOCUMENTED IN MEDICAL RECORD: ICD-10-PCS | Mod: CPTII,S$GLB,, | Performed by: INTERNAL MEDICINE

## 2023-12-05 PROCEDURE — 1101F PT FALLS ASSESS-DOCD LE1/YR: CPT | Mod: CPTII,S$GLB,, | Performed by: INTERNAL MEDICINE

## 2023-12-05 PROCEDURE — 3044F PR MOST RECENT HEMOGLOBIN A1C LEVEL <7.0%: ICD-10-PCS | Mod: CPTII,S$GLB,, | Performed by: INTERNAL MEDICINE

## 2023-12-05 PROCEDURE — 99999 PR PBB SHADOW E&M-EST. PATIENT-LVL III: CPT | Mod: PBBFAC,,, | Performed by: INTERNAL MEDICINE

## 2023-12-05 PROCEDURE — 3074F PR MOST RECENT SYSTOLIC BLOOD PRESSURE < 130 MM HG: ICD-10-PCS | Mod: CPTII,S$GLB,, | Performed by: INTERNAL MEDICINE

## 2023-12-05 PROCEDURE — 1101F PR PT FALLS ASSESS DOC 0-1 FALLS W/OUT INJ PAST YR: ICD-10-PCS | Mod: CPTII,S$GLB,, | Performed by: INTERNAL MEDICINE

## 2023-12-05 PROCEDURE — 3078F PR MOST RECENT DIASTOLIC BLOOD PRESSURE < 80 MM HG: ICD-10-PCS | Mod: CPTII,S$GLB,, | Performed by: INTERNAL MEDICINE

## 2023-12-05 PROCEDURE — 3008F BODY MASS INDEX DOCD: CPT | Mod: CPTII,S$GLB,, | Performed by: INTERNAL MEDICINE

## 2023-12-05 PROCEDURE — 1126F AMNT PAIN NOTED NONE PRSNT: CPT | Mod: CPTII,S$GLB,, | Performed by: INTERNAL MEDICINE

## 2023-12-05 PROCEDURE — 3288F FALL RISK ASSESSMENT DOCD: CPT | Mod: CPTII,S$GLB,, | Performed by: INTERNAL MEDICINE

## 2023-12-05 PROCEDURE — 3044F HG A1C LEVEL LT 7.0%: CPT | Mod: CPTII,S$GLB,, | Performed by: INTERNAL MEDICINE

## 2023-12-05 PROCEDURE — 99999 PR PBB SHADOW E&M-EST. PATIENT-LVL III: ICD-10-PCS | Mod: PBBFAC,,, | Performed by: INTERNAL MEDICINE

## 2023-12-05 PROCEDURE — 3008F PR BODY MASS INDEX (BMI) DOCUMENTED: ICD-10-PCS | Mod: CPTII,S$GLB,, | Performed by: INTERNAL MEDICINE

## 2023-12-05 PROCEDURE — 99214 OFFICE O/P EST MOD 30 MIN: CPT | Mod: S$GLB,,, | Performed by: INTERNAL MEDICINE

## 2023-12-05 PROCEDURE — 1160F RVW MEDS BY RX/DR IN RCRD: CPT | Mod: CPTII,S$GLB,, | Performed by: INTERNAL MEDICINE

## 2023-12-05 PROCEDURE — 3078F DIAST BP <80 MM HG: CPT | Mod: CPTII,S$GLB,, | Performed by: INTERNAL MEDICINE

## 2023-12-05 PROCEDURE — 1159F MED LIST DOCD IN RCRD: CPT | Mod: CPTII,S$GLB,, | Performed by: INTERNAL MEDICINE

## 2023-12-05 PROCEDURE — 1160F PR REVIEW ALL MEDS BY PRESCRIBER/CLIN PHARMACIST DOCUMENTED: ICD-10-PCS | Mod: CPTII,S$GLB,, | Performed by: INTERNAL MEDICINE

## 2023-12-05 PROCEDURE — 1126F PR PAIN SEVERITY QUANTIFIED, NO PAIN PRESENT: ICD-10-PCS | Mod: CPTII,S$GLB,, | Performed by: INTERNAL MEDICINE

## 2023-12-14 NOTE — PROGRESS NOTES
[facility]  FOLLOW-UP VISIT.     Reason for visit: Follow Up     Best Contact Phone Number(s): [phone] (home)      Cancer/Stage/TNM:    Cancer Staging   Very High Risk Prostate Cancer  Staging form: Prostate, AJCC 8th Edition  - Clinical stage from 11/16/2023: Stage IIIB (cT3b, cN0, cM0, PSA: 15.5, Grade Group: 3) - Signed by Cholo Mortensen MD on 11/16/2023       Oncology History   Very High Risk Prostate Cancer   11/16/2023 Initial Diagnosis    Prostate cancer: Patient's PSA previously normal until 15.48 (7/2023) -> 22.2 (9/2023).     10/3/23: MRI Prostate shows PIRADS 5 lesion 32 x 10 mm in medial and lateral aspects of peripheral zones with minimal invasion of the right seminal vesicles, no perineural or extracapsular extension visualized. 8 mm nodules visualized on left iliac bone  11/8/23: Prostate biopsy shows [place] 4+3=7, negative for perineural invasion, positive for LVI  11/14/23: PSMA PET shows activity confined to the prostate gland and seminal vesicles consistent with known malignancy, no lymph node metastases nor distant metastatic disease. Spiculated appearing lung nodule concerning for incidental bronchogenic carcinoma. No irregularity corresponding to iliac lesion on MRI.      11/16/2023 Cancer Staged    Staging form: Prostate, AJCC 8th Edition  - Clinical stage from 11/16/2023: Stage IIIB (cT3b, cN0, cM0, PSA: 15.5, Grade Group: 3)          Interval History: Frank Gay Jr. is a 68 y.o. male with very high risk prostate cancer who presents for follow-up. Since last visit he has started Casodex and is tolerating well. 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 Zytiga arrived yesterday but not yet started. Meets today with Dr. Mortensen to discuss XRT plan.     Patient presents to clinic with wife, Laisha.  ECOG Performance Status is 0.    ROS:  A complete 12-point review of systems was reviewed and is negative except as mentioned above.     Past Medical  History:   Past Medical History:   Diagnosis Date    Arthritis     Cancer     PROSTATE    Elevated PSA     Gout, unspecified     Personal history of venous thrombosis and embolism     After trauma in the 70's, he had a DVT with a PE    Pulmonary embolism     Sleep apnea     RESOLVED WITH UPPP AND SEPTOPLASTY/ NO MACHINE        Allergies:   Review of patient's allergies indicates:   Allergen Reactions    Celebrex  [celecoxib] Blisters, Dermatitis, Edema, Hallucinations, Hives, Itching and Swelling    Dairy aid  [lactase]      Other reaction(s): stomach cramps  Other reaction(s): Itching  Other reaction(s): Diarrhea    Eliquis  [apixaban] Blisters, Edema, Hallucinations, Other (See Comments) and Swelling    Influenza a (h1n1) vac 09 (pf)     Lactose         Medications:   Current Outpatient Medications   Medication Sig Dispense Refill    ammonium lactate (LAC-HYDRIN) 12 % lotion Apply topically 2 (two) times daily.      bicalutamide (CASODEX) 50 MG Tab TAKE 1 TABLET(50 MG) BY MOUTH EVERY DAY. START TAKING NOW TO LOWER TESTOSTERONE 15 tablet 0    LIDOcaine-TETRAcaine 7-7 % Crea APPLY 2 GRAMS TO THE AFFECTED AREAS 3-4 TIMES DAILY, 30 g 0    abiraterone (ZYTIGA) 250 mg Tab Take 1 tablet (250 mg total) by mouth once daily. (Patient not taking: Reported on 12/15/2023.) 30 tablet 11    predniSONE (DELTASONE) 5 MG tablet Take 1 tablet (5 mg total) by mouth once daily. Start with Abiraterone (Patient not taking: Reported on 12/15/2023) 30 tablet 3    traMADoL (ULTRAM) 50 mg tablet Take 50 mg by mouth every 8 (eight) hours as needed.      triamcinolone acetonide 0.1% (KENALOG) 0.1 % cream Apply topically 2 (two) times daily.       No current facility-administered medications for this visit.        Physical Exam:   BP (!) 110/51   Pulse (!) 42   Temp 97.7 °F (36.5 °C) (Temporal)   Resp 18   Ht 6' (1.829 m)   Wt 95.9 kg (211 lb 6.7 oz)   SpO2 99%   BMI 28.67 kg/m²      Physical Exam  Constitutional:        Appearance: Normal appearance.   HENT:      Head: Normocephalic and atraumatic.      Mouth/Throat:      Mouth: Mucous membranes are moist.   Eyes:      Extraocular Movements: Extraocular movements intact.      Pupils: Pupils are equal, round, and reactive to light.   Cardiovascular:      Rate and Rhythm: Normal rate and regular rhythm.      Pulses: Normal pulses.      Heart sounds: No murmur heard.  Pulmonary:      Effort: Pulmonary effort is normal. No respiratory distress.      Breath sounds: Normal breath sounds.   Abdominal:      General: Abdomen is flat. There is no distension.      Palpations: Abdomen is soft.      Tenderness: There is no abdominal tenderness.   Musculoskeletal:         General: No swelling or tenderness. Normal range of motion.      Cervical back: Normal range of motion. No rigidity.   Skin:     General: Skin is warm and dry.      Coloration: Skin is not jaundiced.   Neurological:      General: No focal deficit present.      Mental Status: He is alert and oriented to person, place, and time.   Psychiatric:         Mood and Affect: Mood normal.         Behavior: Behavior normal.         Labs:   Lab Results   Component Value Date    WBC 6.51 12/15/2023    HGB 13.2 (L) 12/15/2023    HCT 40.1 12/15/2023    MCV 88 12/15/2023     12/15/2023       Lab Results   Component Value Date     12/15/2023    K 4.8 12/15/2023     12/15/2023    CO2 27 12/15/2023    BUN 22 12/15/2023    CREATININE 0.8 12/15/2023    ALBUMIN 3.9 12/15/2023    BILITOT 0.5 12/15/2023    ALKPHOS 84 12/15/2023    AST 18 12/15/2023    ALT 16 12/15/2023       Imaging:   CT Chest With Contrast  Narrative: EXAMINATION:  CT CHEST WITH CONTRAST    CLINICAL HISTORY:  Non-small cell lung cancer (NSCLC), staging; Malignant neoplasm of unspecified part of unspecified bronchus or lung    TECHNIQUE:  Following the intravenous administration of 75 cc of Omnipaque 350 contrast material, 1.25 mm low dose contiguous axial images  were acquired through the chest.  Subsequently, 2 dimensional coronal and sagittal reconstructed images were generated from the source data.  Axial MIP images of the lungs were also generated.    COMPARISON:  PET-CT scan-11/14/2023    FINDINGS:  Soft tissues at the base of the neck:Unremarkable.    Heart and great vessels: The heart is normal in size without a significant volume of pericardial fluid.  No thoracic aortic aneurysm. There is atherosclerotic calcification present within the thoracic aortic arch and innominate artery..    Lymph nodes: No pathologically enlarged lymph nodes in the chest or axilla.    Airways: Unremarkable.    Lungs: There is a 12 x 9 x 10 mm spiculated solid irregular nodule observed in the superior segment right lower lobe posteriorly on series 4, image 188 and series 602, image 192.  There are linear radiations extending from the posterior margin of the nodule to the pleural surface.  There are a few additional scattered subcentimeter solid nodules present elsewhere within the chest, the largest of which measure 8 mm in the right upper lobe abutting the interlobar fissure on series 4, image 273, 4 mm in the right middle lobe on series 4, image 327, and 6 mm in the left lower lobe abutting the left hemidiaphragm on series 4 image 367.No emphysematous lung architecture. No bronchiectasis.    Pleura: No significant volume of pleural fluid or pneumothorax.    Upper abdominal soft tissues: There are 2 tiny 4 mm hypodensities present within the liver, 1 within the lateral segment of the left hepatic lobe on series 2, image 110, and the other within the right hepatic lobe on series 2, image 105, both unchanged.  There is a moderate volume of stool in the colon.  Please correlate for symptoms of constipation.  No adrenal masses noted..    Bones: There is degenerative change of the thoracic spine.  No acute thoracic compression fracture.  No imaging evidence of lytic or blastic osseous  metastatic disease.  Impression: 1. Patient with a known history of prostate carcinoma with multiple solid pulmonary nodules scattered within the chest, the largest of which is irregular and spiculated measuring 12 x 9 x 10 mm in the superior segment of the right lower lobe posteriorly.  There is abutment of the pleural surface.  No lymphadenopathy noted in the chest or axilla.  A neoplastic process such as bronchogenic carcinoma and/or pulmonary metastatic disease cannot be excluded (despite the lack of demonstrated FDG avidity the time of the recent PET CT scan).  Strong consideration should be given to performing percutaneous biopsy.  2. 4 mm hepatic hypodensities, statistically most likely cysts but too small to definitively characterize.    Electronically signed by: Anival Powell MD  Date:    11/22/2023  Time:    16:57            Diagnoses:       No diagnosis found.      Assessment and Plan:     #  Very High Risk Prostate Cancer, Stage IIIB ([id number]) - Based off of cT3b patient has very high risk disease. PSMA PET scan shows bronchogenic lung lesions and CT Chest shows several pleural based lesions. After pulmonary evaluation, opt to follow with surveillance and considered less likely malignancy without other sites of metastases and appearance is inconsistent with primary lung malignancy. As such, plan to treat as localized disease with ADT + XRT, and patient meets high risk criteria per [place] for the addition of Abiraterone.      Prescription for Casodex to pharmacy with plans to return to clinic to initiate Lupron same day as radiation oncology visit. We discussed typical side effects of hormone therapy including fatigue, weight gain, loss libido, hot flashes, and muscle loss. Other longer term risks include cardiovascular disease and osteoporosis.     - Lupron today, stop Casodex  - Discuss XRT today with Dr. Mancilla   - Start Abiraterone + Prednisone today, reviewed side effects and will return in 6w  for toxicity check  - DEXA ordered, encourage 4 servings of calcium daily + vit D  - recommend genetic counselor     # Lung Nodule - No smoking history, no cough, does have high risk work exposure. Established care with Dr. Mas and will continue with active surveillance.     he will return to clinic in 6 weeks, but knows to call in the interim if symptoms change or should a problem arise.    Monica Carnes MD  Hematology/Oncology  [facility] [place]      Med Onc Chart Routing      Follow up with physician 6 weeks. Deyvi   Follow up with CAROLINE    Infusion scheduling note    Injection scheduling note    Labs CMP, CBC and PSA   Scheduling:  Preferred lab:  Lab interval:  lab apt prior (cbc, cmp, psa)   Imaging DXA scan   next available   Pharmacy appointment    Other referrals                Answers submitted by the patient for this visit:  Review of Systems Questionnaire (Submitted on 12/12/2023)  appetite change : No  unexpected weight change: No  mouth sores: No  visual disturbance: No  cough: No  shortness of breath: No  chest pain: No  abdominal pain: No  diarrhea: No  frequency: No  back pain: No  rash: No  headaches: No  adenopathy: No  nervous/ anxious: No

## 2023-12-15 ENCOUNTER — OFFICE VISIT (OUTPATIENT)
Dept: HEMATOLOGY/ONCOLOGY | Facility: CLINIC | Age: 68
End: 2023-12-15
Payer: COMMERCIAL

## 2023-12-15 ENCOUNTER — LAB VISIT (OUTPATIENT)
Dept: LAB | Facility: HOSPITAL | Age: 68
End: 2023-12-15
Attending: STUDENT IN AN ORGANIZED HEALTH CARE EDUCATION/TRAINING PROGRAM
Payer: COMMERCIAL

## 2023-12-15 ENCOUNTER — INFUSION (OUTPATIENT)
Dept: INFUSION THERAPY | Facility: HOSPITAL | Age: 68
End: 2023-12-15
Attending: STUDENT IN AN ORGANIZED HEALTH CARE EDUCATION/TRAINING PROGRAM
Payer: COMMERCIAL

## 2023-12-15 ENCOUNTER — OFFICE VISIT (OUTPATIENT)
Dept: RADIATION ONCOLOGY | Facility: CLINIC | Age: 68
End: 2023-12-15
Payer: COMMERCIAL

## 2023-12-15 VITALS
HEART RATE: 46 BPM | BODY MASS INDEX: 28.64 KG/M2 | HEART RATE: 46 BPM | TEMPERATURE: 98 F | RESPIRATION RATE: 18 BRPM | DIASTOLIC BLOOD PRESSURE: 51 MMHG | OXYGEN SATURATION: 99 % | WEIGHT: 211.44 LBS | DIASTOLIC BLOOD PRESSURE: 51 MMHG | RESPIRATION RATE: 18 BRPM | SYSTOLIC BLOOD PRESSURE: 110 MMHG | OXYGEN SATURATION: 99 % | TEMPERATURE: 98 F | HEIGHT: 72 IN | SYSTOLIC BLOOD PRESSURE: 110 MMHG

## 2023-12-15 VITALS
BODY MASS INDEX: 28.64 KG/M2 | HEART RATE: 42 BPM | HEIGHT: 72 IN | TEMPERATURE: 98 F | SYSTOLIC BLOOD PRESSURE: 110 MMHG | RESPIRATION RATE: 18 BRPM | WEIGHT: 211.44 LBS | DIASTOLIC BLOOD PRESSURE: 51 MMHG | OXYGEN SATURATION: 99 %

## 2023-12-15 DIAGNOSIS — C61 PROSTATE CANCER: Primary | ICD-10-CM

## 2023-12-15 DIAGNOSIS — R91.1 PULMONARY NODULE: ICD-10-CM

## 2023-12-15 DIAGNOSIS — C61 PROSTATE CANCER: ICD-10-CM

## 2023-12-15 LAB
ALBUMIN SERPL BCP-MCNC: 3.9 G/DL (ref 3.5–5.2)
ALP SERPL-CCNC: 84 U/L (ref 55–135)
ALT SERPL W/O P-5'-P-CCNC: 16 U/L (ref 10–44)
ANION GAP SERPL CALC-SCNC: 8 MMOL/L (ref 8–16)
AST SERPL-CCNC: 18 U/L (ref 10–40)
BASOPHILS # BLD AUTO: 0.08 K/UL (ref 0–0.2)
BASOPHILS NFR BLD: 1.2 % (ref 0–1.9)
BILIRUB SERPL-MCNC: 0.5 MG/DL (ref 0.1–1)
BUN SERPL-MCNC: 22 MG/DL (ref 8–23)
CALCIUM SERPL-MCNC: 9.4 MG/DL (ref 8.7–10.5)
CHLORIDE SERPL-SCNC: 105 MMOL/L (ref 95–110)
CO2 SERPL-SCNC: 27 MMOL/L (ref 23–29)
COMPLEXED PSA SERPL-MCNC: 12.6 NG/ML (ref 0–4)
CREAT SERPL-MCNC: 0.8 MG/DL (ref 0.5–1.4)
DIFFERENTIAL METHOD: ABNORMAL
EOSINOPHIL # BLD AUTO: 0.1 K/UL (ref 0–0.5)
EOSINOPHIL NFR BLD: 2 % (ref 0–8)
ERYTHROCYTE [DISTWIDTH] IN BLOOD BY AUTOMATED COUNT: 12.7 % (ref 11.5–14.5)
EST. GFR  (NO RACE VARIABLE): >60 ML/MIN/1.73 M^2
GLUCOSE SERPL-MCNC: 81 MG/DL (ref 70–110)
HCT VFR BLD AUTO: 40.1 % (ref 40–54)
HGB BLD-MCNC: 13.2 G/DL (ref 14–18)
IMM GRANULOCYTES # BLD AUTO: 0.04 K/UL (ref 0–0.04)
IMM GRANULOCYTES NFR BLD AUTO: 0.6 % (ref 0–0.5)
LYMPHOCYTES # BLD AUTO: 1.8 K/UL (ref 1–4.8)
LYMPHOCYTES NFR BLD: 27.3 % (ref 18–48)
MCH RBC QN AUTO: 29 PG (ref 27–31)
MCHC RBC AUTO-ENTMCNC: 32.9 G/DL (ref 32–36)
MCV RBC AUTO: 88 FL (ref 82–98)
MONOCYTES # BLD AUTO: 1 K/UL (ref 0.3–1)
MONOCYTES NFR BLD: 15.4 % (ref 4–15)
NEUTROPHILS # BLD AUTO: 3.5 K/UL (ref 1.8–7.7)
NEUTROPHILS NFR BLD: 53.5 % (ref 38–73)
NRBC BLD-RTO: 0 /100 WBC
PLATELET # BLD AUTO: 278 K/UL (ref 150–450)
PMV BLD AUTO: 10.2 FL (ref 9.2–12.9)
POTASSIUM SERPL-SCNC: 4.8 MMOL/L (ref 3.5–5.1)
PROT SERPL-MCNC: 6.9 G/DL (ref 6–8.4)
RBC # BLD AUTO: 4.55 M/UL (ref 4.6–6.2)
SODIUM SERPL-SCNC: 140 MMOL/L (ref 136–145)
WBC # BLD AUTO: 6.51 K/UL (ref 3.9–12.7)

## 2023-12-15 PROCEDURE — 99999 PR PBB SHADOW E&M-EST. PATIENT-LVL III: CPT | Mod: PBBFAC,,, | Performed by: STUDENT IN AN ORGANIZED HEALTH CARE EDUCATION/TRAINING PROGRAM

## 2023-12-15 PROCEDURE — 3008F BODY MASS INDEX DOCD: CPT | Mod: CPTII,S$GLB,, | Performed by: STUDENT IN AN ORGANIZED HEALTH CARE EDUCATION/TRAINING PROGRAM

## 2023-12-15 PROCEDURE — 3008F PR BODY MASS INDEX (BMI) DOCUMENTED: ICD-10-PCS | Mod: CPTII,S$GLB,, | Performed by: STUDENT IN AN ORGANIZED HEALTH CARE EDUCATION/TRAINING PROGRAM

## 2023-12-15 PROCEDURE — 3044F HG A1C LEVEL LT 7.0%: CPT | Mod: CPTII,S$GLB,, | Performed by: STUDENT IN AN ORGANIZED HEALTH CARE EDUCATION/TRAINING PROGRAM

## 2023-12-15 PROCEDURE — 3078F DIAST BP <80 MM HG: CPT | Mod: CPTII,S$GLB,, | Performed by: STUDENT IN AN ORGANIZED HEALTH CARE EDUCATION/TRAINING PROGRAM

## 2023-12-15 PROCEDURE — 99213 OFFICE O/P EST LOW 20 MIN: CPT | Mod: S$GLB,,, | Performed by: RADIOLOGY

## 2023-12-15 PROCEDURE — 1101F PT FALLS ASSESS-DOCD LE1/YR: CPT | Mod: CPTII,S$GLB,, | Performed by: STUDENT IN AN ORGANIZED HEALTH CARE EDUCATION/TRAINING PROGRAM

## 2023-12-15 PROCEDURE — 3078F DIAST BP <80 MM HG: CPT | Mod: CPTII,S$GLB,, | Performed by: RADIOLOGY

## 2023-12-15 PROCEDURE — 1126F AMNT PAIN NOTED NONE PRSNT: CPT | Mod: CPTII,S$GLB,, | Performed by: STUDENT IN AN ORGANIZED HEALTH CARE EDUCATION/TRAINING PROGRAM

## 2023-12-15 PROCEDURE — 3074F SYST BP LT 130 MM HG: CPT | Mod: CPTII,S$GLB,, | Performed by: STUDENT IN AN ORGANIZED HEALTH CARE EDUCATION/TRAINING PROGRAM

## 2023-12-15 PROCEDURE — 84153 ASSAY OF PSA TOTAL: CPT | Performed by: STUDENT IN AN ORGANIZED HEALTH CARE EDUCATION/TRAINING PROGRAM

## 2023-12-15 PROCEDURE — 3288F FALL RISK ASSESSMENT DOCD: CPT | Mod: CPTII,S$GLB,, | Performed by: STUDENT IN AN ORGANIZED HEALTH CARE EDUCATION/TRAINING PROGRAM

## 2023-12-15 PROCEDURE — 99213 PR OFFICE/OUTPT VISIT, EST, LEVL III, 20-29 MIN: ICD-10-PCS | Mod: S$GLB,,, | Performed by: RADIOLOGY

## 2023-12-15 PROCEDURE — 3074F PR MOST RECENT SYSTOLIC BLOOD PRESSURE < 130 MM HG: ICD-10-PCS | Mod: CPTII,S$GLB,, | Performed by: RADIOLOGY

## 2023-12-15 PROCEDURE — 99214 OFFICE O/P EST MOD 30 MIN: CPT | Mod: S$GLB,,, | Performed by: STUDENT IN AN ORGANIZED HEALTH CARE EDUCATION/TRAINING PROGRAM

## 2023-12-15 PROCEDURE — 99999 PR PBB SHADOW E&M-EST. PATIENT-LVL III: CPT | Mod: PBBFAC,,, | Performed by: RADIOLOGY

## 2023-12-15 PROCEDURE — 63600175 PHARM REV CODE 636 W HCPCS: Mod: JZ,JG,PN | Performed by: STUDENT IN AN ORGANIZED HEALTH CARE EDUCATION/TRAINING PROGRAM

## 2023-12-15 PROCEDURE — 1159F PR MEDICATION LIST DOCUMENTED IN MEDICAL RECORD: ICD-10-PCS | Mod: CPTII,S$GLB,, | Performed by: STUDENT IN AN ORGANIZED HEALTH CARE EDUCATION/TRAINING PROGRAM

## 2023-12-15 PROCEDURE — 3288F FALL RISK ASSESSMENT DOCD: CPT | Mod: CPTII,S$GLB,, | Performed by: RADIOLOGY

## 2023-12-15 PROCEDURE — 3044F HG A1C LEVEL LT 7.0%: CPT | Mod: CPTII,S$GLB,, | Performed by: RADIOLOGY

## 2023-12-15 PROCEDURE — 1160F RVW MEDS BY RX/DR IN RCRD: CPT | Mod: CPTII,S$GLB,, | Performed by: STUDENT IN AN ORGANIZED HEALTH CARE EDUCATION/TRAINING PROGRAM

## 2023-12-15 PROCEDURE — 99999 PR PBB SHADOW E&M-EST. PATIENT-LVL III: ICD-10-PCS | Mod: PBBFAC,,, | Performed by: STUDENT IN AN ORGANIZED HEALTH CARE EDUCATION/TRAINING PROGRAM

## 2023-12-15 PROCEDURE — 80053 COMPREHEN METABOLIC PANEL: CPT | Mod: PN | Performed by: STUDENT IN AN ORGANIZED HEALTH CARE EDUCATION/TRAINING PROGRAM

## 2023-12-15 PROCEDURE — 1126F PR PAIN SEVERITY QUANTIFIED, NO PAIN PRESENT: ICD-10-PCS | Mod: CPTII,S$GLB,, | Performed by: STUDENT IN AN ORGANIZED HEALTH CARE EDUCATION/TRAINING PROGRAM

## 2023-12-15 PROCEDURE — 3078F PR MOST RECENT DIASTOLIC BLOOD PRESSURE < 80 MM HG: ICD-10-PCS | Mod: CPTII,S$GLB,, | Performed by: STUDENT IN AN ORGANIZED HEALTH CARE EDUCATION/TRAINING PROGRAM

## 2023-12-15 PROCEDURE — 3074F PR MOST RECENT SYSTOLIC BLOOD PRESSURE < 130 MM HG: ICD-10-PCS | Mod: CPTII,S$GLB,, | Performed by: STUDENT IN AN ORGANIZED HEALTH CARE EDUCATION/TRAINING PROGRAM

## 2023-12-15 PROCEDURE — 3044F PR MOST RECENT HEMOGLOBIN A1C LEVEL <7.0%: ICD-10-PCS | Mod: CPTII,S$GLB,, | Performed by: RADIOLOGY

## 2023-12-15 PROCEDURE — 3044F PR MOST RECENT HEMOGLOBIN A1C LEVEL <7.0%: ICD-10-PCS | Mod: CPTII,S$GLB,, | Performed by: STUDENT IN AN ORGANIZED HEALTH CARE EDUCATION/TRAINING PROGRAM

## 2023-12-15 PROCEDURE — 3288F PR FALLS RISK ASSESSMENT DOCUMENTED: ICD-10-PCS | Mod: CPTII,S$GLB,, | Performed by: RADIOLOGY

## 2023-12-15 PROCEDURE — 99999 PR PBB SHADOW E&M-EST. PATIENT-LVL III: ICD-10-PCS | Mod: PBBFAC,,, | Performed by: RADIOLOGY

## 2023-12-15 PROCEDURE — 1160F PR REVIEW ALL MEDS BY PRESCRIBER/CLIN PHARMACIST DOCUMENTED: ICD-10-PCS | Mod: CPTII,S$GLB,, | Performed by: STUDENT IN AN ORGANIZED HEALTH CARE EDUCATION/TRAINING PROGRAM

## 2023-12-15 PROCEDURE — 3074F SYST BP LT 130 MM HG: CPT | Mod: CPTII,S$GLB,, | Performed by: RADIOLOGY

## 2023-12-15 PROCEDURE — 36415 COLL VENOUS BLD VENIPUNCTURE: CPT | Mod: PN | Performed by: STUDENT IN AN ORGANIZED HEALTH CARE EDUCATION/TRAINING PROGRAM

## 2023-12-15 PROCEDURE — 85025 COMPLETE CBC W/AUTO DIFF WBC: CPT | Mod: PN | Performed by: STUDENT IN AN ORGANIZED HEALTH CARE EDUCATION/TRAINING PROGRAM

## 2023-12-15 PROCEDURE — 99214 PR OFFICE/OUTPT VISIT, EST, LEVL IV, 30-39 MIN: ICD-10-PCS | Mod: S$GLB,,, | Performed by: STUDENT IN AN ORGANIZED HEALTH CARE EDUCATION/TRAINING PROGRAM

## 2023-12-15 PROCEDURE — 1159F MED LIST DOCD IN RCRD: CPT | Mod: CPTII,S$GLB,, | Performed by: STUDENT IN AN ORGANIZED HEALTH CARE EDUCATION/TRAINING PROGRAM

## 2023-12-15 PROCEDURE — 1101F PR PT FALLS ASSESS DOC 0-1 FALLS W/OUT INJ PAST YR: ICD-10-PCS | Mod: CPTII,S$GLB,, | Performed by: RADIOLOGY

## 2023-12-15 PROCEDURE — 1101F PT FALLS ASSESS-DOCD LE1/YR: CPT | Mod: CPTII,S$GLB,, | Performed by: RADIOLOGY

## 2023-12-15 PROCEDURE — 3288F PR FALLS RISK ASSESSMENT DOCUMENTED: ICD-10-PCS | Mod: CPTII,S$GLB,, | Performed by: STUDENT IN AN ORGANIZED HEALTH CARE EDUCATION/TRAINING PROGRAM

## 2023-12-15 PROCEDURE — 96402 CHEMO HORMON ANTINEOPL SQ/IM: CPT | Mod: PN

## 2023-12-15 PROCEDURE — 3078F PR MOST RECENT DIASTOLIC BLOOD PRESSURE < 80 MM HG: ICD-10-PCS | Mod: CPTII,S$GLB,, | Performed by: RADIOLOGY

## 2023-12-15 PROCEDURE — 1101F PR PT FALLS ASSESS DOC 0-1 FALLS W/OUT INJ PAST YR: ICD-10-PCS | Mod: CPTII,S$GLB,, | Performed by: STUDENT IN AN ORGANIZED HEALTH CARE EDUCATION/TRAINING PROGRAM

## 2023-12-15 RX ORDER — TRAMADOL HYDROCHLORIDE 50 MG/1
50 TABLET ORAL EVERY 8 HOURS PRN
COMMUNITY
Start: 2023-11-29 | End: 2024-01-30 | Stop reason: ALTCHOICE

## 2023-12-15 RX ADMIN — LEUPROLIDE ACETATE 22.5 MG: KIT at 11:12

## 2023-12-15 NOTE — PROGRESS NOTES
[facility] Department of Radiation Oncology  Follow Up Visit Note    Diagnosis:  Frank Gay Jr. is a 68 y.o. male with   clinical [id number] Grade Group 3, PSA 15.48,  Very High Risk adenocarcinoma of the prostate.  He returns today to discuss logistics for radiation therapy. He will have his first lupron injection today.    PSMA/PET identified Superior Right Lower Lobe Lung nodule  this is to be followed with CT chest in 2/24.     Oncologic History:  Oncology History   Very High Risk Prostate Cancer   11/16/2023 Initial Diagnosis    Prostate cancer: Patient's PSA previously normal until 15.48 (7/2023) -> 22.2 (9/2023).     10/3/23: MRI Prostate shows PIRADS 5 lesion 32 x 10 mm in medial and lateral aspects of peripheral zones with minimal invasion of the right seminal vesicles, no perineural or extracapsular extension visualized. 8 mm nodules visualized on left iliac bone  11/8/23: Prostate biopsy shows [place] 4+3=7, negative for perineural invasion, positive for LVI  11/14/23: PSMA PET shows activity confined to the prostate gland and seminal vesicles consistent with known malignancy, no lymph node metastases nor distant metastatic disease. Spiculated appearing lung nodule concerning for incidental bronchogenic carcinoma. No irregularity corresponding to iliac lesion on MRI.      11/16/2023 Cancer Staged    Staging form: Prostate, AJCC 8th Edition  - Clinical stage from 11/16/2023: Stage IIIB (cT3b, cN0, cM0, PSA: 15.5, Grade Group: 3)          Interval History  The patient presents today for a regularly scheduled follow up visit.  He was last seen in our clinic on 11/16/23.   Since that time, he has tolerated casodex, and will be initiating lupron today. He met with pulmonary and plan is for f/u CT chest in 2/24.    Review of Systems:   Constistutional: Denies fever, chills. No recent weight changes. Denies nights sweats.  Eyes: No redness or dryness.  ENT: Denies dry mouth. No  ulcers.  Card: Denies chest pain. No PND, or orthopnea.   Resp: Denies cough. Denies shortness of breath.  Gastro: Denies nausea or vomiting, constipation, diarrhea.  Genito: No kidney stones. Denies dysuria.  Skin: No rash, psoriasis, or alopecia.  Musculoskeletal:No myalgia. No muscle weakness.  Neuro: No numbness or tingling. No history of seizures or psychosis.  Psych: Denies depression. Denies anxiety.  Endo: Denies history of diabetes. No thyroid disease.  Heme: Denies anemia. No blood clots or bleeding diathesis.  Allergic: Denies seasonal allergies.   All other systems negative    Social History:  Social History     Tobacco Use    Smoking status: Never    Smokeless tobacco: Former     Quit date: 7/1/2017   Substance Use Topics    Alcohol use: No    Drug use: No       Family History:  Cancer-related family history includes Cancer in his father; Prostate cancer in his father.    Exam:  Vitals:    12/15/23 1000   BP: (!) 110/51   Pulse: (!) 46   Resp: 18   Temp: 97.7 °F (36.5 °C)   SpO2: 99%     Constitutional: Pleasant 68 y.o. male in no acute distress.  Well nourished. Well groomed.   HEENT: Normocephalic and atraumatic   Lungs: No audible wheezing.  Normal effort.   Musculoskeletal: No gross MSK deformities. Ambulates  Skin: No rashes appreciated.   Psych: Alert and oriented with appropriate mood and affect.  Neuro:   Grossly normal.    Data Review:    Discussion with Another Provider or Non-healthcare Professional:  I discussed this case with Dr. Carnes in order to coordinate care.      Assessment:  Very High Risk Adenocarcinoma of the prostate initiating lupron today.  ECOG: (0) Fully active, able to carry on all predisease performance without restriction    Plan:  Will plan on CT simulation in 7 weeks. Goal will be to deliver a plan to treat the prostate and SVs to a dose of 70 Gy in 28 fractions at 2.5 Gy per fraction delivered daily with a dose of 50.4 to the pelvis.  He was given our contact  information, and he was told that he could call our clinic at anytime if he has any questions or concerns.  Follow up with other providers as directed

## 2024-01-05 ENCOUNTER — PATIENT MESSAGE (OUTPATIENT)
Dept: ADMINISTRATIVE | Facility: OTHER | Age: 69
End: 2024-01-05
Payer: COMMERCIAL

## 2024-01-05 ENCOUNTER — PATIENT MESSAGE (OUTPATIENT)
Dept: GENETICS | Facility: CLINIC | Age: 69
End: 2024-01-05
Payer: COMMERCIAL

## 2024-01-18 ENCOUNTER — PATIENT MESSAGE (OUTPATIENT)
Dept: PULMONOLOGY | Facility: CLINIC | Age: 69
End: 2024-01-18
Payer: COMMERCIAL

## 2024-01-24 NOTE — PROGRESS NOTES
[facility]  FOLLOW-UP VISIT.     Reason for visit: Establish Care     Best Contact Phone Number(s): [phone] (home)      Cancer/Stage/TNM:    Cancer Staging   Very High Risk Prostate Cancer  Staging form: Prostate, AJCC 8th Edition  - Clinical stage from 11/16/2023: Stage IIIB (cT3b, cN0, cM0, PSA: 15.5, Grade Group: 3) - Signed by Cholo Mortensen MD on 11/16/2023       Oncology History   Very High Risk Prostate Cancer   11/16/2023 Initial Diagnosis    Prostate cancer: Patient's PSA previously normal until 15.48 (7/2023) -> 22.2 (9/2023).     10/3/23: MRI Prostate shows PIRADS 5 lesion 32 x 10 mm in medial and lateral aspects of peripheral zones with minimal invasion of the right seminal vesicles, no perineural or extracapsular extension visualized. 8 mm nodules visualized on left iliac bone  11/8/23: Prostate biopsy shows [place] 4+3=7, negative for perineural invasion, positive for LVI  11/14/23: PSMA PET shows activity confined to the prostate gland and seminal vesicles consistent with known malignancy, no lymph node metastases nor distant metastatic disease. Spiculated appearing lung nodule concerning for incidental bronchogenic carcinoma. No irregularity corresponding to iliac lesion on MRI.      11/16/2023 Cancer Staged    Staging form: Prostate, AJCC 8th Edition  - Clinical stage from 11/16/2023: Stage IIIB (cT3b, cN0, cM0, PSA: 15.5, Grade Group: 3)        Interval History: Frank Gay Jr. is a 68 y.o. male with prostate cancer who presents for follow up. Since last visit patient has started on Zytiga. Met with Dr. Mortensen yesterday, plans to start radiation therapy in next 1-2w. Switching to low fat breakfast. No heart palpitations. He notes infrequent hot flashes x2 weeks, usually in the morning. Energy is stable but does note some fatigue.     Patient presents to clinic with wife, Laisha.  ECOG Performance Status is 0.    ROS:  A complete 12-point review of systems was  reviewed and is negative except as mentioned above.     Past Medical History:   Past Medical History:   Diagnosis Date    Arthritis     Cancer     PROSTATE    Elevated PSA     Genetic testing 12/21/2023    [facility] Multi Cancer Panel (70 genes) - NEGATIVE    Gout, unspecified     Personal history of venous thrombosis and embolism     After trauma in the 70's, he had a DVT with a PE    Pulmonary embolism     Sleep apnea     RESOLVED WITH UPPP AND SEPTOPLASTY/ NO MACHINE        Allergies:   Review of patient's allergies indicates:   Allergen Reactions    Celebrex  [celecoxib] Blisters, Dermatitis, Edema, Hallucinations, Hives, Itching and Swelling    Dairy aid  [lactase]      Other reaction(s): stomach cramps  Other reaction(s): Itching  Other reaction(s): Diarrhea    Eliquis  [apixaban] Blisters, Edema, Hallucinations, Other (See Comments) and Swelling    Influenza a (h1n1) vac 09 (pf)     Lactose         Medications:   Current Outpatient Medications   Medication Sig Dispense Refill    abiraterone (ZYTIGA) 250 mg Tab Take 1 tablet (250 mg total) by mouth once daily. 30 tablet 11    ammonium lactate (LAC-HYDRIN) 12 % lotion Apply topically 2 (two) times daily.      LIDOcaine-TETRAcaine 7-7 % Crea APPLY 2 GRAMS TO THE AFFECTED AREAS 3-4 TIMES DAILY, 30 g 0    predniSONE (DELTASONE) 5 MG tablet Take 1 tablet (5 mg total) by mouth once daily. Start with Abiraterone 30 tablet 3     No current facility-administered medications for this visit.        Physical Exam:   /60   Pulse (!) 47   Temp 97.5 °F (36.4 °C) (Temporal)   Resp 10   Ht 6' (1.829 m)   Wt 94.3 kg (207 lb 14.3 oz)   SpO2 98%   BMI 28.20 kg/m²      Physical Exam  Constitutional:       Appearance: Normal appearance.   HENT:      Head: Normocephalic and atraumatic.      Mouth/Throat:      Mouth: Mucous membranes are moist.   Eyes:      Extraocular Movements: Extraocular movements intact.      Pupils: Pupils are equal, round, and reactive to light.    Cardiovascular:      Rate and Rhythm: Normal rate and regular rhythm.      Pulses: Normal pulses.      Heart sounds: No murmur heard.  Pulmonary:      Effort: Pulmonary effort is normal. No respiratory distress.      Breath sounds: Normal breath sounds.   Abdominal:      General: Abdomen is flat. There is no distension.      Palpations: Abdomen is soft.      Tenderness: There is no abdominal tenderness.   Musculoskeletal:         General: No swelling or tenderness. Normal range of motion.      Cervical back: Normal range of motion. No rigidity.   Skin:     General: Skin is warm and dry.      Coloration: Skin is not jaundiced.   Neurological:      General: No focal deficit present.      Mental Status: He is alert and oriented to person, place, and time.   Psychiatric:         Mood and Affect: Mood normal.         Behavior: Behavior normal.           Labs:   Lab Results   Component Value Date    WBC 6.68 01/30/2024    HGB 13.5 (L) 01/30/2024    HCT 40.0 01/30/2024    MCV 87 01/30/2024     01/30/2024       Lab Results   Component Value Date     01/30/2024    K 4.2 01/30/2024     01/30/2024    CO2 28 01/30/2024    BUN 16 01/30/2024    CREATININE 0.9 01/30/2024    ALBUMIN 3.7 01/30/2024    BILITOT 0.5 01/30/2024    ALKPHOS 65 01/30/2024    AST 15 01/30/2024    ALT 17 01/30/2024       Imaging:   CT Chest With Contrast  Narrative: EXAMINATION:  CT CHEST WITH CONTRAST    CLINICAL HISTORY:  Non-small cell lung cancer (NSCLC), staging; Malignant neoplasm of unspecified part of unspecified bronchus or lung    TECHNIQUE:  Following the intravenous administration of 75 cc of Omnipaque 350 contrast material, 1.25 mm low dose contiguous axial images were acquired through the chest.  Subsequently, 2 dimensional coronal and sagittal reconstructed images were generated from the source data.  Axial MIP images of the lungs were also generated.    COMPARISON:  PET-CT scan-11/14/2023    FINDINGS:  Soft tissues at the  base of the neck:Unremarkable.    Heart and great vessels: The heart is normal in size without a significant volume of pericardial fluid.  No thoracic aortic aneurysm. There is atherosclerotic calcification present within the thoracic aortic arch and innominate artery..    Lymph nodes: No pathologically enlarged lymph nodes in the chest or axilla.    Airways: Unremarkable.    Lungs: There is a 12 x 9 x 10 mm spiculated solid irregular nodule observed in the superior segment right lower lobe posteriorly on series 4, image 188 and series 602, image 192.  There are linear radiations extending from the posterior margin of the nodule to the pleural surface.  There are a few additional scattered subcentimeter solid nodules present elsewhere within the chest, the largest of which measure 8 mm in the right upper lobe abutting the interlobar fissure on series 4, image 273, 4 mm in the right middle lobe on series 4, image 327, and 6 mm in the left lower lobe abutting the left hemidiaphragm on series 4 image 367.No emphysematous lung architecture. No bronchiectasis.    Pleura: No significant volume of pleural fluid or pneumothorax.    Upper abdominal soft tissues: There are 2 tiny 4 mm hypodensities present within the liver, 1 within the lateral segment of the left hepatic lobe on series 2, image 110, and the other within the right hepatic lobe on series 2, image 105, both unchanged.  There is a moderate volume of stool in the colon.  Please correlate for symptoms of constipation.  No adrenal masses noted..    Bones: There is degenerative change of the thoracic spine.  No acute thoracic compression fracture.  No imaging evidence of lytic or blastic osseous metastatic disease.  Impression: 1. Patient with a known history of prostate carcinoma with multiple solid pulmonary nodules scattered within the chest, the largest of which is irregular and spiculated measuring 12 x 9 x 10 mm in the superior segment of the right lower lobe  posteriorly.  There is abutment of the pleural surface.  No lymphadenopathy noted in the chest or axilla.  A neoplastic process such as bronchogenic carcinoma and/or pulmonary metastatic disease cannot be excluded (despite the lack of demonstrated FDG avidity the time of the recent PET CT scan).  Strong consideration should be given to performing percutaneous biopsy.  2. 4 mm hepatic hypodensities, statistically most likely cysts but too small to definitively characterize.    Electronically signed by: Anival Powell MD  Date:    11/22/2023  Time:    16:57            Diagnoses:       1. Very High Risk Prostate Cancer    2. Pulmonary nodule    3. Encounter for monitoring androgen deprivation therapy    4. Hepatic lesion          Assessment and Plan:     #  Very High Risk Prostate Cancer, Stage IIIB ([id number]) - Based off of cT3b patient has very high risk disease. PSMA PET scan shows bronchogenic lung lesions and CT Chest shows several pleural based lesions. After pulmonary evaluation, opt to follow with surveillance and considered less likely malignancy without other sites of metastases and appearance is inconsistent with primary lung malignancy. As such, plan to treat as localized disease with ADT + XRT, and patient meets high risk criteria per [place] for the addition of Abiraterone.      Patient will be starting radiation therapy 2/7/24 x28 fractions. Tolerating Zytiga well. Next Lupron due 3/8/24.    - DEXA ordered not yet scheduled, encourage 4 servings of calcium daily + vit D     # ADT - Hot flashes noted, patient denies need for pharmaceutical therapy or acupuncture. Needs to schedule DEXA scan.     # Lung Nodule - No smoking history, no cough, does have high risk work exposure. Established care with Dr. Mas and will continue with active surveillance. Next CT Chest scheduled 3/13/24, add CT Abd/Pelvis as below for liver lesions. No issues with breathing  - move CT to [facility] and reschedule Dr. Mas  due to radiation schedule and travel constraints    # Hepatic lesions - <1 cm cysts seen on PSMA and CT Chest. Will reevaluate with full CT CAP.     he will return to clinic in 2m, but knows to call in the interim if symptoms change or should a problem arise.    Monica Carnes MD  Hematology/Oncology  [facility]      Med Onc Chart Routing      Follow up with physician . Deyvi 3/13 at 1030a   Follow up with CAROLINE    Infusion scheduling note   Lupron after clinic visit   Injection scheduling note    Labs    Imaging   CT CAP and DEXA scan same day between 2/7 and 3/13   Pharmacy appointment    Other referrals         Will message dr jansen to push back clinic visit

## 2024-01-29 ENCOUNTER — HOSPITAL ENCOUNTER (OUTPATIENT)
Dept: RADIATION THERAPY | Facility: HOSPITAL | Age: 69
Discharge: HOME | End: 2024-01-29
Attending: STUDENT IN AN ORGANIZED HEALTH CARE EDUCATION/TRAINING PROGRAM
Payer: COMMERCIAL

## 2024-01-29 ENCOUNTER — OFFICE VISIT (OUTPATIENT)
Dept: RADIATION ONCOLOGY | Facility: CLINIC | Age: 69
End: 2024-01-29
Payer: COMMERCIAL

## 2024-01-29 VITALS
TEMPERATURE: 98 F | DIASTOLIC BLOOD PRESSURE: 72 MMHG | SYSTOLIC BLOOD PRESSURE: 149 MMHG | RESPIRATION RATE: 16 BRPM | HEIGHT: 72 IN | WEIGHT: 207 LBS | OXYGEN SATURATION: 98 % | HEART RATE: 44 BPM | BODY MASS INDEX: 28.04 KG/M2

## 2024-01-29 DIAGNOSIS — C61 PROSTATE CANCER: Primary | ICD-10-CM

## 2024-01-29 PROCEDURE — 1159F MED LIST DOCD IN RCRD: CPT | Mod: CPTII,S$GLB,, | Performed by: RADIOLOGY

## 2024-01-29 PROCEDURE — 1126F AMNT PAIN NOTED NONE PRSNT: CPT | Mod: CPTII,S$GLB,, | Performed by: RADIOLOGY

## 2024-01-29 PROCEDURE — 99213 OFFICE O/P EST LOW 20 MIN: CPT | Mod: 25,S$GLB,, | Performed by: RADIOLOGY

## 2024-01-29 PROCEDURE — 1101F PT FALLS ASSESS-DOCD LE1/YR: CPT | Mod: CPTII,S$GLB,, | Performed by: RADIOLOGY

## 2024-01-29 PROCEDURE — 99999 PR PBB SHADOW E&M-EST. PATIENT-LVL III: CPT | Mod: PBBFAC,,, | Performed by: RADIOLOGY

## 2024-01-29 PROCEDURE — 3078F DIAST BP <80 MM HG: CPT | Mod: CPTII,S$GLB,, | Performed by: RADIOLOGY

## 2024-01-29 PROCEDURE — 77290 THER RAD SIMULAJ FIELD CPLX: CPT | Mod: TC,PN | Performed by: RADIOLOGY

## 2024-01-29 PROCEDURE — 77334 RADIATION TREATMENT AID(S): CPT | Mod: 26,,, | Performed by: RADIOLOGY

## 2024-01-29 PROCEDURE — 3288F FALL RISK ASSESSMENT DOCD: CPT | Mod: CPTII,S$GLB,, | Performed by: RADIOLOGY

## 2024-01-29 PROCEDURE — 77014 HC CT GUIDANCE RADIATION THERAPY FLDS PLACEMENT: CPT | Mod: TC,PN | Performed by: RADIOLOGY

## 2024-01-29 PROCEDURE — 3008F BODY MASS INDEX DOCD: CPT | Mod: CPTII,S$GLB,, | Performed by: RADIOLOGY

## 2024-01-29 PROCEDURE — 3077F SYST BP >= 140 MM HG: CPT | Mod: CPTII,S$GLB,, | Performed by: RADIOLOGY

## 2024-01-29 PROCEDURE — 77290 THER RAD SIMULAJ FIELD CPLX: CPT | Mod: 26,,, | Performed by: RADIOLOGY

## 2024-01-29 PROCEDURE — 77334 RADIATION TREATMENT AID(S): CPT | Mod: TC,PN | Performed by: RADIOLOGY

## 2024-01-29 NOTE — PROGRESS NOTES
[facility] Radiation Oncology  Follow Up Visit Note    Diagnosis:  Frank Gay Jr. is a 68 y.o. male with a(n) very high risk adenocarcinoma of the prostate.  He has decided to pursue concurrent ADT and radiation therapy.  He presents today for radiation therapy planning session.      Oncologic History:  Oncology History   Very High Risk Prostate Cancer   11/16/2023 Initial Diagnosis    Prostate cancer: Patient's PSA previously normal until 15.48 (7/2023) -> 22.2 (9/2023).     10/3/23: MRI Prostate shows PIRADS 5 lesion 32 x 10 mm in medial and lateral aspects of peripheral zones with minimal invasion of the right seminal vesicles, no perineural or extracapsular extension visualized. 8 mm nodules visualized on left iliac bone  11/8/23: Prostate biopsy shows [place] 4+3=7, negative for perineural invasion, positive for LVI  11/14/23: PSMA PET shows activity confined to the prostate gland and seminal vesicles consistent with known malignancy, no lymph node metastases nor distant metastatic disease. Spiculated appearing lung nodule concerning for incidental bronchogenic carcinoma. No irregularity corresponding to iliac lesion on MRI.      11/16/2023 Cancer Staged    Staging form: Prostate, AJCC 8th Edition  - Clinical stage from 11/16/2023: Stage IIIB (cT3b, cN0, cM0, PSA: 15.5, Grade Group: 3)          Interval History  The patient presents today for CT simulation for radiation treatment planning.  He was last seen in our clinic on 12/15/23.   Since that time, he initiated ADT with Dr. Carnes on 12/15/23.  He is tolerating that therapy well, he notes increased pain in the left hip. This is likely related to prior trauma in that hip.      Review of Systems   Constistutional: Denies fever, chills. No recent weight changes. Denies nights sweats.  Eyes: No redness or dryness.  ENT: Denies dry mouth. No ulcers.  Card: Denies chest pain. No PND, or orthopnea.   Resp: Denies cough.  Denies shortness of breath.  Gastro: Denies nausea or vomiting, constipation, diarrhea.  Genito: No kidney stones. Denies dysuria.  Skin: No rash, psoriasis, or alopecia.  Musculoskeletal:No myalgia. No muscle weakness.  Neuro: No numbness or tingling. No history of seizures or psychosis.  Psych: Denies depression. Denies anxiety.  Endo: Denies history of diabetes. No thyroid disease.  Heme: Denies anemia. No blood clots or bleeding diathesis.  Allergic: Denies seasonal allergies.   All other systems negative    Social History:  Social History     Tobacco Use    Smoking status: Never    Smokeless tobacco: Former     Quit date: 7/1/2017   Substance Use Topics    Alcohol use: No    Drug use: No       Family History:  Cancer-related family history includes Cancer in his father; Prostate cancer in his father.    Exam:  Vitals:    01/29/24 0914   BP: (!) 149/72   Pulse: (!) 44   Resp: 16   Temp: 98.2 °F (36.8 °C)   SpO2: 98%   Weight: 93.9 kg (207 lb 0.2 oz)   Height: 6' (1.829 m)     Constitutional: Pleasant 68 y.o. male in no acute distress.  Well nourished. Well groomed.   HEENT: Normocephalic and atraumatic   Lungs: No audible wheezing.  Normal effort.   Musculoskeletal: No gross MSK deformities. Ambulates  Skin: No rashes appreciated.   Psych: Alert and oriented with appropriate mood and affect.  Neuro:   Grossly normal.      Assessment:  Mr. Gay is a 68 year old with very high risk adenocarcinoma of the prostate. He initiated ADT 7 weeks ago.  ECOG: (0) Fully active, able to carry on all predisease performance without restriction    Plan:  CT simulation today for radiation therapy planning  Indication, course, and potential toxicities of pelvic radiation therapy reviewed in detail, including but not limited to fatigue, increased frequency, urgency, or pain with urination, increased frequency or urgency of bowel movements, diarrhea, pelvic bone fragility, erectile dysfunction, decreased volume of ejaculate,  remote risk of secondary malignancy.   Informed consent obtained  Plan to start radiation therapy in 1-2 weeks.  Next injection 3/8/24  Follow up with Urology as directed  He was given our contact information, and he was told that he could call our clinic at anytime if he has any questions or concerns.  Follow up with other providers as directed

## 2024-01-30 ENCOUNTER — LAB VISIT (OUTPATIENT)
Dept: LAB | Facility: HOSPITAL | Age: 69
End: 2024-01-30
Attending: STUDENT IN AN ORGANIZED HEALTH CARE EDUCATION/TRAINING PROGRAM
Payer: COMMERCIAL

## 2024-01-30 ENCOUNTER — OFFICE VISIT (OUTPATIENT)
Dept: HEMATOLOGY/ONCOLOGY | Facility: CLINIC | Age: 69
End: 2024-01-30
Payer: COMMERCIAL

## 2024-01-30 VITALS
WEIGHT: 207.88 LBS | TEMPERATURE: 98 F | HEART RATE: 47 BPM | SYSTOLIC BLOOD PRESSURE: 131 MMHG | DIASTOLIC BLOOD PRESSURE: 60 MMHG | RESPIRATION RATE: 10 BRPM | BODY MASS INDEX: 28.16 KG/M2 | OXYGEN SATURATION: 98 % | HEIGHT: 72 IN

## 2024-01-30 DIAGNOSIS — C61 PROSTATE CANCER: Primary | ICD-10-CM

## 2024-01-30 DIAGNOSIS — Z79.818 ENCOUNTER FOR MONITORING ANDROGEN DEPRIVATION THERAPY: ICD-10-CM

## 2024-01-30 DIAGNOSIS — C61 PROSTATE CANCER: ICD-10-CM

## 2024-01-30 DIAGNOSIS — R91.1 PULMONARY NODULE: ICD-10-CM

## 2024-01-30 DIAGNOSIS — K76.9 HEPATIC LESION: ICD-10-CM

## 2024-01-30 LAB
ALBUMIN SERPL BCP-MCNC: 3.7 G/DL (ref 3.5–5.2)
ALP SERPL-CCNC: 65 U/L (ref 55–135)
ALT SERPL W/O P-5'-P-CCNC: 17 U/L (ref 10–44)
ANION GAP SERPL CALC-SCNC: 10 MMOL/L (ref 8–16)
AST SERPL-CCNC: 15 U/L (ref 10–40)
BASOPHILS # BLD AUTO: 0.07 K/UL (ref 0–0.2)
BASOPHILS NFR BLD: 1 % (ref 0–1.9)
BILIRUB SERPL-MCNC: 0.5 MG/DL (ref 0.1–1)
BUN SERPL-MCNC: 16 MG/DL (ref 8–23)
CALCIUM SERPL-MCNC: 9.2 MG/DL (ref 8.7–10.5)
CHLORIDE SERPL-SCNC: 103 MMOL/L (ref 95–110)
CO2 SERPL-SCNC: 28 MMOL/L (ref 23–29)
COMPLEXED PSA SERPL-MCNC: 0.3 NG/ML (ref 0–4)
CREAT SERPL-MCNC: 0.9 MG/DL (ref 0.5–1.4)
DIFFERENTIAL METHOD BLD: ABNORMAL
EOSINOPHIL # BLD AUTO: 0.1 K/UL (ref 0–0.5)
EOSINOPHIL NFR BLD: 1 % (ref 0–8)
ERYTHROCYTE [DISTWIDTH] IN BLOOD BY AUTOMATED COUNT: 12.5 % (ref 11.5–14.5)
EST. GFR  (NO RACE VARIABLE): >60 ML/MIN/1.73 M^2
GLUCOSE SERPL-MCNC: 156 MG/DL (ref 70–110)
HCT VFR BLD AUTO: 40 % (ref 40–54)
HGB BLD-MCNC: 13.5 G/DL (ref 14–18)
IMM GRANULOCYTES # BLD AUTO: 0.02 K/UL (ref 0–0.04)
IMM GRANULOCYTES NFR BLD AUTO: 0.3 % (ref 0–0.5)
LYMPHOCYTES # BLD AUTO: 1.7 K/UL (ref 1–4.8)
LYMPHOCYTES NFR BLD: 24.7 % (ref 18–48)
MCH RBC QN AUTO: 29.4 PG (ref 27–31)
MCHC RBC AUTO-ENTMCNC: 33.8 G/DL (ref 32–36)
MCV RBC AUTO: 87 FL (ref 82–98)
MONOCYTES # BLD AUTO: 0.5 K/UL (ref 0.3–1)
MONOCYTES NFR BLD: 7.9 % (ref 4–15)
NEUTROPHILS # BLD AUTO: 4.3 K/UL (ref 1.8–7.7)
NEUTROPHILS NFR BLD: 65.1 % (ref 38–73)
NRBC BLD-RTO: 0 /100 WBC
PLATELET # BLD AUTO: 244 K/UL (ref 150–450)
PMV BLD AUTO: 10.1 FL (ref 9.2–12.9)
POTASSIUM SERPL-SCNC: 4.2 MMOL/L (ref 3.5–5.1)
PROT SERPL-MCNC: 6.5 G/DL (ref 6–8.4)
RBC # BLD AUTO: 4.59 M/UL (ref 4.6–6.2)
SODIUM SERPL-SCNC: 141 MMOL/L (ref 136–145)
WBC # BLD AUTO: 6.68 K/UL (ref 3.9–12.7)

## 2024-01-30 PROCEDURE — 3075F SYST BP GE 130 - 139MM HG: CPT | Mod: CPTII,S$GLB,, | Performed by: STUDENT IN AN ORGANIZED HEALTH CARE EDUCATION/TRAINING PROGRAM

## 2024-01-30 PROCEDURE — 84153 ASSAY OF PSA TOTAL: CPT | Performed by: STUDENT IN AN ORGANIZED HEALTH CARE EDUCATION/TRAINING PROGRAM

## 2024-01-30 PROCEDURE — 3078F DIAST BP <80 MM HG: CPT | Mod: CPTII,S$GLB,, | Performed by: STUDENT IN AN ORGANIZED HEALTH CARE EDUCATION/TRAINING PROGRAM

## 2024-01-30 PROCEDURE — 1101F PT FALLS ASSESS-DOCD LE1/YR: CPT | Mod: CPTII,S$GLB,, | Performed by: STUDENT IN AN ORGANIZED HEALTH CARE EDUCATION/TRAINING PROGRAM

## 2024-01-30 PROCEDURE — 85025 COMPLETE CBC W/AUTO DIFF WBC: CPT | Mod: PN | Performed by: STUDENT IN AN ORGANIZED HEALTH CARE EDUCATION/TRAINING PROGRAM

## 2024-01-30 PROCEDURE — 36415 COLL VENOUS BLD VENIPUNCTURE: CPT | Mod: PN | Performed by: STUDENT IN AN ORGANIZED HEALTH CARE EDUCATION/TRAINING PROGRAM

## 2024-01-30 PROCEDURE — 1126F AMNT PAIN NOTED NONE PRSNT: CPT | Mod: CPTII,S$GLB,, | Performed by: STUDENT IN AN ORGANIZED HEALTH CARE EDUCATION/TRAINING PROGRAM

## 2024-01-30 PROCEDURE — 3008F BODY MASS INDEX DOCD: CPT | Mod: CPTII,S$GLB,, | Performed by: STUDENT IN AN ORGANIZED HEALTH CARE EDUCATION/TRAINING PROGRAM

## 2024-01-30 PROCEDURE — 99999 PR PBB SHADOW E&M-EST. PATIENT-LVL IV: CPT | Mod: PBBFAC,,, | Performed by: STUDENT IN AN ORGANIZED HEALTH CARE EDUCATION/TRAINING PROGRAM

## 2024-01-30 PROCEDURE — 3288F FALL RISK ASSESSMENT DOCD: CPT | Mod: CPTII,S$GLB,, | Performed by: STUDENT IN AN ORGANIZED HEALTH CARE EDUCATION/TRAINING PROGRAM

## 2024-01-30 PROCEDURE — 1159F MED LIST DOCD IN RCRD: CPT | Mod: CPTII,S$GLB,, | Performed by: STUDENT IN AN ORGANIZED HEALTH CARE EDUCATION/TRAINING PROGRAM

## 2024-01-30 PROCEDURE — 80053 COMPREHEN METABOLIC PANEL: CPT | Mod: PN | Performed by: STUDENT IN AN ORGANIZED HEALTH CARE EDUCATION/TRAINING PROGRAM

## 2024-01-30 PROCEDURE — 99215 OFFICE O/P EST HI 40 MIN: CPT | Mod: S$GLB,,, | Performed by: STUDENT IN AN ORGANIZED HEALTH CARE EDUCATION/TRAINING PROGRAM

## 2024-01-30 NOTE — Clinical Note
Hi! Since Mr. Gay is receiving XRT 2/7 - 3/16, I was wondering if we can obtain his CT Chest while he is here getting radiation and move follow up to after radiation so he doesn't have to be same day Evaristo and SARITA? We can certainly take care of rescheduling the imaging.   Thanks, Monica

## 2024-02-01 ENCOUNTER — HOSPITAL ENCOUNTER (OUTPATIENT)
Dept: RADIATION THERAPY | Facility: HOSPITAL | Age: 69
Discharge: HOME | End: 2024-02-01
Attending: RADIOLOGY
Payer: COMMERCIAL

## 2024-02-01 ENCOUNTER — PATIENT MESSAGE (OUTPATIENT)
Dept: ADMINISTRATIVE | Facility: OTHER | Age: 69
End: 2024-02-01
Payer: COMMERCIAL

## 2024-02-05 PROCEDURE — 77301 RADIOTHERAPY DOSE PLAN IMRT: CPT | Mod: TC,PN | Performed by: RADIOLOGY

## 2024-02-05 PROCEDURE — 77301 RADIOTHERAPY DOSE PLAN IMRT: CPT | Mod: 26,,, | Performed by: RADIOLOGY

## 2024-02-06 ENCOUNTER — DOCUMENTATION ONLY (OUTPATIENT)
Dept: RADIATION ONCOLOGY | Facility: CLINIC | Age: 69
End: 2024-02-06
Payer: COMMERCIAL

## 2024-02-06 PROCEDURE — 77385 HC IMRT, SIMPLE: CPT | Mod: PN | Performed by: RADIOLOGY

## 2024-02-06 PROCEDURE — 77300 RADIATION THERAPY DOSE PLAN: CPT | Mod: TC,PN | Performed by: RADIOLOGY

## 2024-02-06 PROCEDURE — 77338 DESIGN MLC DEVICE FOR IMRT: CPT | Mod: TC,59,PN | Performed by: RADIOLOGY

## 2024-02-06 PROCEDURE — 77427 RADIATION TX MANAGEMENT X5: CPT | Mod: ,,, | Performed by: RADIOLOGY

## 2024-02-06 PROCEDURE — 77014 PR  CT GUIDANCE PLACEMENT RAD THERAPY FIELDS: CPT | Mod: 26,,, | Performed by: RADIOLOGY

## 2024-02-06 PROCEDURE — 77338 DESIGN MLC DEVICE FOR IMRT: CPT | Mod: 26,,, | Performed by: RADIOLOGY

## 2024-02-06 PROCEDURE — 77300 RADIATION THERAPY DOSE PLAN: CPT | Mod: 26,,, | Performed by: RADIOLOGY

## 2024-02-07 PROCEDURE — 77014 PR  CT GUIDANCE PLACEMENT RAD THERAPY FIELDS: CPT | Mod: 26,,, | Performed by: RADIOLOGY

## 2024-02-07 PROCEDURE — 77385 HC IMRT, SIMPLE: CPT | Mod: PN | Performed by: RADIOLOGY

## 2024-02-08 ENCOUNTER — DOCUMENTATION ONLY (OUTPATIENT)
Dept: RADIATION ONCOLOGY | Facility: CLINIC | Age: 69
End: 2024-02-08
Payer: COMMERCIAL

## 2024-02-08 PROCEDURE — 77014 PR  CT GUIDANCE PLACEMENT RAD THERAPY FIELDS: CPT | Mod: 26,,, | Performed by: RADIOLOGY

## 2024-02-08 PROCEDURE — 77385 HC IMRT, SIMPLE: CPT | Mod: PN | Performed by: RADIOLOGY

## 2024-02-09 PROCEDURE — 77014 PR  CT GUIDANCE PLACEMENT RAD THERAPY FIELDS: CPT | Mod: 26,,, | Performed by: RADIOLOGY

## 2024-02-09 PROCEDURE — 77385 HC IMRT, SIMPLE: CPT | Mod: PN | Performed by: RADIOLOGY

## 2024-02-12 PROCEDURE — 77014 PR  CT GUIDANCE PLACEMENT RAD THERAPY FIELDS: CPT | Mod: 26,,, | Performed by: RADIOLOGY

## 2024-02-12 PROCEDURE — 77385 HC IMRT, SIMPLE: CPT | Mod: PN | Performed by: RADIOLOGY

## 2024-02-13 PROCEDURE — 77336 RADIATION PHYSICS CONSULT: CPT | Mod: PN | Performed by: RADIOLOGY

## 2024-02-14 PROCEDURE — 77014 PR  CT GUIDANCE PLACEMENT RAD THERAPY FIELDS: CPT | Mod: 26,,, | Performed by: RADIOLOGY

## 2024-02-14 PROCEDURE — 77427 RADIATION TX MANAGEMENT X5: CPT | Mod: ,,, | Performed by: RADIOLOGY

## 2024-02-14 PROCEDURE — 77385 HC IMRT, SIMPLE: CPT | Mod: PN | Performed by: RADIOLOGY

## 2024-02-15 PROCEDURE — 77014 PR  CT GUIDANCE PLACEMENT RAD THERAPY FIELDS: CPT | Mod: 26,,, | Performed by: RADIOLOGY

## 2024-02-15 PROCEDURE — 77385 HC IMRT, SIMPLE: CPT | Mod: PN | Performed by: RADIOLOGY

## 2024-02-16 PROCEDURE — 77385 HC IMRT, SIMPLE: CPT | Mod: PN | Performed by: RADIOLOGY

## 2024-02-16 PROCEDURE — 77014 PR  CT GUIDANCE PLACEMENT RAD THERAPY FIELDS: CPT | Mod: 26,,, | Performed by: RADIOLOGY

## 2024-02-19 PROCEDURE — 77385 HC IMRT, SIMPLE: CPT | Mod: PN | Performed by: RADIOLOGY

## 2024-02-19 PROCEDURE — 77014 PR  CT GUIDANCE PLACEMENT RAD THERAPY FIELDS: CPT | Mod: 26,,, | Performed by: RADIOLOGY

## 2024-02-20 PROCEDURE — 77336 RADIATION PHYSICS CONSULT: CPT | Mod: PN | Performed by: RADIOLOGY

## 2024-02-20 PROCEDURE — 77014 PR  CT GUIDANCE PLACEMENT RAD THERAPY FIELDS: CPT | Mod: 26,,, | Performed by: RADIOLOGY

## 2024-02-20 PROCEDURE — 77385 HC IMRT, SIMPLE: CPT | Mod: PN | Performed by: RADIOLOGY

## 2024-02-21 ENCOUNTER — DOCUMENTATION ONLY (OUTPATIENT)
Dept: RADIATION ONCOLOGY | Facility: CLINIC | Age: 69
End: 2024-02-21
Payer: COMMERCIAL

## 2024-02-21 PROCEDURE — 77014 PR  CT GUIDANCE PLACEMENT RAD THERAPY FIELDS: CPT | Mod: 26,,, | Performed by: RADIOLOGY

## 2024-02-21 PROCEDURE — 77385 HC IMRT, SIMPLE: CPT | Mod: PN | Performed by: RADIOLOGY

## 2024-02-22 PROCEDURE — 77385 HC IMRT, SIMPLE: CPT | Mod: PN | Performed by: RADIOLOGY

## 2024-02-22 PROCEDURE — 77014 PR  CT GUIDANCE PLACEMENT RAD THERAPY FIELDS: CPT | Mod: 26,,, | Performed by: RADIOLOGY

## 2024-02-23 PROCEDURE — 77014 PR  CT GUIDANCE PLACEMENT RAD THERAPY FIELDS: CPT | Mod: 26,,, | Performed by: RADIOLOGY

## 2024-02-23 PROCEDURE — 77385 HC IMRT, SIMPLE: CPT | Mod: PN | Performed by: RADIOLOGY

## 2024-02-26 PROCEDURE — 77385 HC IMRT, SIMPLE: CPT | Mod: PN | Performed by: RADIOLOGY

## 2024-02-26 PROCEDURE — 77014 PR  CT GUIDANCE PLACEMENT RAD THERAPY FIELDS: CPT | Mod: 26,,, | Performed by: RADIOLOGY

## 2024-02-27 PROCEDURE — 77014 PR  CT GUIDANCE PLACEMENT RAD THERAPY FIELDS: CPT | Mod: 26,,, | Performed by: RADIOLOGY

## 2024-02-27 PROCEDURE — 77385 HC IMRT, SIMPLE: CPT | Mod: PN | Performed by: RADIOLOGY

## 2024-02-28 PROCEDURE — 77385 HC IMRT, SIMPLE: CPT | Mod: PN | Performed by: RADIOLOGY

## 2024-02-28 PROCEDURE — 77427 RADIATION TX MANAGEMENT X5: CPT | Mod: ,,, | Performed by: RADIOLOGY

## 2024-02-28 PROCEDURE — 77336 RADIATION PHYSICS CONSULT: CPT | Mod: PN | Performed by: RADIOLOGY

## 2024-02-28 PROCEDURE — 77014 PR  CT GUIDANCE PLACEMENT RAD THERAPY FIELDS: CPT | Mod: 26,,, | Performed by: RADIOLOGY

## 2024-02-29 PROCEDURE — 77014 PR  CT GUIDANCE PLACEMENT RAD THERAPY FIELDS: CPT | Mod: 26,,, | Performed by: RADIOLOGY

## 2024-02-29 PROCEDURE — 77385 HC IMRT, SIMPLE: CPT | Mod: PN | Performed by: RADIOLOGY

## 2024-03-01 ENCOUNTER — PATIENT MESSAGE (OUTPATIENT)
Dept: ADMINISTRATIVE | Facility: OTHER | Age: 69
End: 2024-03-01
Payer: COMMERCIAL

## 2024-03-01 ENCOUNTER — HOSPITAL ENCOUNTER (OUTPATIENT)
Dept: RADIATION THERAPY | Facility: HOSPITAL | Age: 69
Discharge: HOME | End: 2024-03-01
Attending: RADIOLOGY
Payer: COMMERCIAL

## 2024-03-01 PROCEDURE — 77385 HC IMRT, SIMPLE: CPT | Mod: PN | Performed by: RADIOLOGY

## 2024-03-01 PROCEDURE — 77014 PR  CT GUIDANCE PLACEMENT RAD THERAPY FIELDS: CPT | Mod: 26,,, | Performed by: RADIOLOGY

## 2024-03-04 PROCEDURE — 77014 PR  CT GUIDANCE PLACEMENT RAD THERAPY FIELDS: CPT | Mod: 26,,, | Performed by: RADIOLOGY

## 2024-03-04 PROCEDURE — 77385 HC IMRT, SIMPLE: CPT | Mod: PN | Performed by: RADIOLOGY

## 2024-03-05 PROCEDURE — 77014 PR  CT GUIDANCE PLACEMENT RAD THERAPY FIELDS: CPT | Mod: 26,,, | Performed by: RADIOLOGY

## 2024-03-05 PROCEDURE — 77336 RADIATION PHYSICS CONSULT: CPT | Mod: PN | Performed by: RADIOLOGY

## 2024-03-05 PROCEDURE — 77385 HC IMRT, SIMPLE: CPT | Mod: PN | Performed by: RADIOLOGY

## 2024-03-06 PROCEDURE — 77427 RADIATION TX MANAGEMENT X5: CPT | Mod: ,,, | Performed by: RADIOLOGY

## 2024-03-06 PROCEDURE — 77385 HC IMRT, SIMPLE: CPT | Mod: PN | Performed by: RADIOLOGY

## 2024-03-06 PROCEDURE — 77014 PR  CT GUIDANCE PLACEMENT RAD THERAPY FIELDS: CPT | Mod: 26,,, | Performed by: RADIOLOGY

## 2024-03-07 ENCOUNTER — DOCUMENTATION ONLY (OUTPATIENT)
Dept: RADIATION ONCOLOGY | Facility: CLINIC | Age: 69
End: 2024-03-07
Payer: COMMERCIAL

## 2024-03-07 PROCEDURE — 77014 PR  CT GUIDANCE PLACEMENT RAD THERAPY FIELDS: CPT | Mod: 26,,, | Performed by: RADIOLOGY

## 2024-03-07 PROCEDURE — 77385 HC IMRT, SIMPLE: CPT | Mod: PN | Performed by: RADIOLOGY

## 2024-03-08 ENCOUNTER — HOSPITAL ENCOUNTER (OUTPATIENT)
Dept: RADIOLOGY | Facility: HOSPITAL | Age: 69
Discharge: HOME | End: 2024-03-08
Attending: RADIOLOGY
Payer: COMMERCIAL

## 2024-03-08 ENCOUNTER — HOSPITAL ENCOUNTER (OUTPATIENT)
Dept: RADIOLOGY | Facility: HOSPITAL | Age: 69
Discharge: HOME | End: 2024-03-08
Attending: STUDENT IN AN ORGANIZED HEALTH CARE EDUCATION/TRAINING PROGRAM
Payer: COMMERCIAL

## 2024-03-08 DIAGNOSIS — Z79.818 ENCOUNTER FOR MONITORING ANDROGEN DEPRIVATION THERAPY: ICD-10-CM

## 2024-03-08 DIAGNOSIS — C61 PROSTATE CANCER: ICD-10-CM

## 2024-03-08 PROCEDURE — 77385 HC IMRT, SIMPLE: CPT | Mod: PN | Performed by: RADIOLOGY

## 2024-03-08 PROCEDURE — 71250 CT THORAX DX C-: CPT | Mod: 26,,, | Performed by: RADIOLOGY

## 2024-03-08 PROCEDURE — 71250 CT THORAX DX C-: CPT | Mod: TC,PO

## 2024-03-08 PROCEDURE — 77014 PR  CT GUIDANCE PLACEMENT RAD THERAPY FIELDS: CPT | Mod: 26,,, | Performed by: RADIOLOGY

## 2024-03-08 PROCEDURE — 77080 DXA BONE DENSITY AXIAL: CPT | Mod: 26,,, | Performed by: RADIOLOGY

## 2024-03-08 PROCEDURE — 77080 DXA BONE DENSITY AXIAL: CPT | Mod: TC,PO

## 2024-03-11 PROCEDURE — 77014 PR  CT GUIDANCE PLACEMENT RAD THERAPY FIELDS: CPT | Mod: 26,,, | Performed by: RADIOLOGY

## 2024-03-11 PROCEDURE — 77385 HC IMRT, SIMPLE: CPT | Mod: PN | Performed by: RADIOLOGY

## 2024-03-12 PROCEDURE — 77385 HC IMRT, SIMPLE: CPT | Mod: PN | Performed by: RADIOLOGY

## 2024-03-12 PROCEDURE — 77014 PR  CT GUIDANCE PLACEMENT RAD THERAPY FIELDS: CPT | Mod: 26,,, | Performed by: RADIOLOGY

## 2024-03-12 PROCEDURE — 77336 RADIATION PHYSICS CONSULT: CPT | Mod: PN | Performed by: RADIOLOGY

## 2024-03-12 NOTE — PROGRESS NOTES
[facility]  FOLLOW-UP VISIT.     Reason for visit: Establish Care     Best Contact Phone Number(s): [phone] (home)      Cancer/Stage/TNM:    Cancer Staging   Very High Risk Prostate Cancer  Staging form: Prostate, AJCC 8th Edition  - Clinical stage from 11/16/2023: Stage IIIB (cT3b, cN0, cM0, PSA: 15.5, Grade Group: 3) - Signed by Cholo Mortensen MD on 11/16/2023       Oncology History   Very High Risk Prostate Cancer   11/16/2023 Initial Diagnosis    Prostate cancer: Patient's PSA previously normal until 15.48 (7/2023) -> 22.2 (9/2023).     10/3/23: MRI Prostate shows PIRADS 5 lesion 32 x 10 mm in medial and lateral aspects of peripheral zones with minimal invasion of the right seminal vesicles, no perineural or extracapsular extension visualized. 8 mm nodules visualized on left iliac bone  11/8/23: Prostate biopsy shows [place]tor[place] 4+3=7, negative for perineural invasion, positive for LVI  11/14/23: PSMA PET shows activity confined to the prostate gland and seminal vesicles consistent with known malignancy, no lymph node metastases nor distant metastatic disease. Spiculated appearing lung nodule concerning for incidental bronchogenic carcinoma. No irregularity corresponding to iliac lesion on MRI.      11/16/2023 Cancer Staged    Staging form: Prostate, AJCC 8th Edition  - Clinical stage from 11/16/2023: Stage IIIB (cT3b, cN0, cM0, PSA: 15.5, Grade Group: 3)     2/6/2024 - 3/15/2024 Radiation Therapy    Treating physician: Radha Mortensen and Marlo  Total Dose: 55 Gy  Fractions: 28        Interval History: Frank Gay Jr. is a 68 y.o. male with localized prostate cancer who presents for follow up. Since last visit, CT Chest shows stable findings. Upcoming visit with pulmonary scheduled. He has started prostate radiation, last dose on 3/15. He notes some skin irritation but no other concerning findings. No new breathing concerns. Taking abiraterone without difficulty, takes lower dose  with low fat breakfast. Notes occasional hot flashes, one severe but otherwise manageable.    Patient presents to clinic with wife, Laisha.  ECOG Performance Status is 0.    ROS:  A complete 12-point review of systems was reviewed and is negative except as mentioned above.     Past Medical History:   Past Medical History:   Diagnosis Date    Arthritis     Cancer     PROSTATE    Elevated PSA     Genetic testing 12/21/2023    [facility] Multi Cancer Panel (70 genes) - NEGATIVE    Gout, unspecified     Personal history of venous thrombosis and embolism     After trauma in the 70's, he had a DVT with a PE    Pulmonary embolism     Sleep apnea     RESOLVED WITH UPPP AND SEPTOPLASTY/ NO MACHINE        Allergies:   Review of patient's allergies indicates:   Allergen Reactions    Celebrex  [celecoxib] Blisters, Dermatitis, Edema, Hallucinations, Hives, Itching and Swelling    Dairy aid  [lactase]      Other reaction(s): stomach cramps  Other reaction(s): Itching  Other reaction(s): Diarrhea    Eliquis  [apixaban] Blisters, Edema, Hallucinations, Other (See Comments) and Swelling    Influenza a (h1n1) vac 09 (pf)     Lactose         Medications:   Current Outpatient Medications   Medication Sig Dispense Refill    abiraterone (ZYTIGA) 250 mg Tab Take 1 tablet (250 mg total) by mouth once daily. 30 tablet 11    ammonium lactate (LAC-HYDRIN) 12 % lotion Apply topically 2 (two) times daily.      colchicine (COLCRYS) 0.6 mg tablet Take 0.6 mg by mouth once daily.      LIDOcaine-TETRAcaine 7-7 % Crea APPLY 2 GRAMS TO THE AFFECTED AREAS 3-4 TIMES DAILY, 30 g 0    predniSONE (DELTASONE) 5 MG tablet Take 1 tablet (5 mg total) by mouth once daily. Start with Abiraterone 30 tablet 3     No current facility-administered medications for this visit.        Physical Exam:   BP (!) 124/58   Pulse (!) 45   Temp 97.6 °F (36.4 °C) (Temporal)   Resp 18   Ht 6' (1.829 m)   Wt 94 kg (207 lb 3.7 oz)   SpO2 97%   BMI 28.11 kg/m²      Physical  Exam  Constitutional:       Appearance: Normal appearance.   HENT:      Head: Normocephalic and atraumatic.      Mouth/Throat:      Mouth: Mucous membranes are moist.   Eyes:      Extraocular Movements: Extraocular movements intact.      Pupils: Pupils are equal, round, and reactive to light.   Cardiovascular:      Rate and Rhythm: Normal rate and regular rhythm.      Pulses: Normal pulses.      Heart sounds: No murmur heard.  Pulmonary:      Effort: Pulmonary effort is normal. No respiratory distress.      Breath sounds: Normal breath sounds.   Abdominal:      General: Abdomen is flat. There is no distension.      Palpations: Abdomen is soft.      Tenderness: There is no abdominal tenderness.   Musculoskeletal:         General: No swelling or tenderness. Normal range of motion.      Cervical back: Normal range of motion. No rigidity.   Skin:     General: Skin is warm and dry.      Coloration: Skin is not jaundiced.   Neurological:      General: No focal deficit present.      Mental Status: He is alert and oriented to person, place, and time.   Psychiatric:         Mood and Affect: Mood normal.         Behavior: Behavior normal.           Labs:   Lab Results   Component Value Date    WBC 4.99 03/08/2024    HGB 12.8 (L) 03/08/2024    HCT 37.6 (L) 03/08/2024    MCV 89 03/08/2024     03/08/2024       Lab Results   Component Value Date     03/08/2024    K 4.6 03/08/2024     03/08/2024    CO2 31 (H) 03/08/2024    BUN 14 03/08/2024    CREATININE 0.7 03/08/2024    ALBUMIN 3.6 03/08/2024    BILITOT 0.4 03/08/2024    ALKPHOS 69 03/08/2024    AST 23 03/08/2024    ALT 29 03/08/2024       Imaging:   CT CHEST WITHOUT CONTRAST  Narrative: EXAMINATION:  CT CHEST WITHOUT CONTRAST    CLINICAL HISTORY:  Lung nodule, > 8mm;ION Robotic Protocol; Malignant neoplasm of prostate    TECHNIQUE:  Low dose axial images, sagittal and coronal reformations were obtained from the thoracic inlet to the lung bases. Contrast was  not administered.    COMPARISON:  11/22/2023    FINDINGS:  There is a solid spiculated nodule within the superior segment of the left lower lobe measuring 1.1 cm, without significant change (series 4, image 159).    A solid 7 mm nodule in the inferior right upper lobe along the minor fissure is also unchanged (series, image 257).    The heart size is normal without pericardial effusion.  There is no hilar or mediastinal lymphadenopathy.    Degenerative change of the spine typical for age is present.  Impression: Unchanged pulmonary nodules, including a spiculated 1.1 cm superior segment right lower lobe nodule for which neoplasm is not excluded.  Additional follow-up in 6-12 months is recommended.    Electronically signed by: Adriano Lopez MD  Date:    03/08/2024  Time:    11:24  DXA Bone Density Axial Skeleton 1 or more sites  Narrative: EXAMINATION:  DXA BONE DENSITY AXIAL SKELETON 1 OR MORE SITES    CLINICAL HISTORY:  Long term (current) use of other agents affecting estrogen receptors and estrogen levels    TECHNIQUE:  DXA scanning was performed over the left hip and lumbar spine.  Review of the images confirms satisfactory positioning and technique.    COMPARISON:  None    FINDINGS:  The L1 to L4 vertebral bone mineral density is equal to 1.165 g/cm squared with a T score of 0.7.    The left femoral neck bone mineral density is equal to 0.691 g/cm squared with a T score of -1.8.    There is a 18% risk of a major osteoporotic fracture and a 3.6% risk of hip fracture in the next 10 years (FRAX).  Impression: Osteopenia of the left hip, normal bone mineral density of the lumbar spine    Consider FDA approved medical therapies in postmenopausal women and men aged 50 years and older, based on the following:    *A hip or vertebral (clinical or morphometric) fracture  *T score less than or equal to -2.5 at the femoral neck or spine after appropriate evaluation to exclude secondary causes.  *Low bone mass -- also  known as osteopenia (T score between -1.0 and -2.5 at the femoral neck or spine) and a 10 year probability of hip fracture greater than or equal to 3% or a 10 year probability of major osteoporosis-related fracture greater than or equal to 20% based on the [place]-adapted WHO algorithm.  *Clinicians judgment and/or patient preference may indicate treatment for people with 10 year fracture probabilities is above or below these levels.    Electronically signed by: Gilles Belcher MD  Date:    03/08/2024  Time:    09:24            Diagnoses:       1. Prostate cancer    2. Osteopenia of left hip    3. Androgen deprivation therapy    4. Pulmonary nodule    5. Hepatic cyst            Assessment and Plan:     #  Very High Risk Prostate Cancer, Stage IIIB ([id number]) - Based off of cT3b patient has very high risk disease. PSMA PET scan shows bronchogenic lung lesions and CT Chest shows several pleural based lesions. After pulmonary evaluation, opt to follow with surveillance and considered less likely malignancy without other sites of metastases and appearance is inconsistent with primary lung malignancy. As such, plan to treat as localized disease with ADT + XRT, and patient meets high risk criteria per [place] for the addition of Abiraterone. Genetic counseling negative.      Patient will be starting radiation therapy 2/7/24 x28 fractions. Tolerating Zytiga well. Lupron today, next due 6/2024. DEXA scan shows osteopenia of left hip, encourage 4 servings of calcium daily + vit D.  - repeat cmp in 6w and 12w on Abiraterone  - return to clinic in 3m with next dose of Lupron  - completing radiation therapy 3/15  - plan for repeat PSMA PET prior to next visit     # Osteopenia - Left hip osteopenia, notes occasional pain but not consistent     # ADT - Hot flashes noted, patient denies need for pharmaceutical therapy or acupuncture.     # Lung Nodule - No smoking history, no cough, does have high risk work exposure. Established care  with Dr. Mas and will continue with active surveillance. Repeat CT CAP with stable pulmonary findings - fu with Dr. Mas as scheduled     # Hepatic lesions - <1 cm cysts seen on PSMA and CT Chest. Restaging A/P not complete, will follow on next imaging study    he will return to clinic in 3m, but knows to call in the interim if symptoms change or should a problem arise.    Monica Carnes MD  Hematology/Oncology  [facility] [place]      Med Onc Chart Routing      Follow up with physician 3 months. lavell prior to lupron in june   Follow up with CAROLINE    Infusion scheduling note    Injection scheduling note lupron as scheduled   Labs   Scheduling:  Preferred lab:  Lab interval:  Lab apt in 6w (cbc, cmp) and before clinic visit (cbc, cmp, psa)   Imaging PET scan   3m, prior to next visit   Pharmacy appointment    Other referrals

## 2024-03-13 ENCOUNTER — OFFICE VISIT (OUTPATIENT)
Dept: HEMATOLOGY/ONCOLOGY | Facility: CLINIC | Age: 69
End: 2024-03-13
Payer: COMMERCIAL

## 2024-03-13 ENCOUNTER — INFUSION (OUTPATIENT)
Dept: INFUSION THERAPY | Facility: HOSPITAL | Age: 69
End: 2024-03-13
Attending: STUDENT IN AN ORGANIZED HEALTH CARE EDUCATION/TRAINING PROGRAM
Payer: COMMERCIAL

## 2024-03-13 ENCOUNTER — PATIENT MESSAGE (OUTPATIENT)
Dept: HEMATOLOGY/ONCOLOGY | Facility: CLINIC | Age: 69
End: 2024-03-13

## 2024-03-13 VITALS
SYSTOLIC BLOOD PRESSURE: 124 MMHG | RESPIRATION RATE: 18 BRPM | OXYGEN SATURATION: 97 % | HEIGHT: 72 IN | DIASTOLIC BLOOD PRESSURE: 58 MMHG | WEIGHT: 207.25 LBS | TEMPERATURE: 98 F | BODY MASS INDEX: 28.07 KG/M2 | HEART RATE: 45 BPM

## 2024-03-13 VITALS
RESPIRATION RATE: 16 BRPM | WEIGHT: 207.25 LBS | DIASTOLIC BLOOD PRESSURE: 61 MMHG | TEMPERATURE: 98 F | BODY MASS INDEX: 28.11 KG/M2 | SYSTOLIC BLOOD PRESSURE: 123 MMHG | HEART RATE: 56 BPM

## 2024-03-13 DIAGNOSIS — K76.89 HEPATIC CYST: ICD-10-CM

## 2024-03-13 DIAGNOSIS — C61 PROSTATE CANCER: Primary | ICD-10-CM

## 2024-03-13 DIAGNOSIS — M85.852 OSTEOPENIA OF LEFT HIP: ICD-10-CM

## 2024-03-13 DIAGNOSIS — R91.1 PULMONARY NODULE: ICD-10-CM

## 2024-03-13 DIAGNOSIS — Z79.818 ANDROGEN DEPRIVATION THERAPY: ICD-10-CM

## 2024-03-13 PROCEDURE — 1159F MED LIST DOCD IN RCRD: CPT | Mod: CPTII,S$GLB,, | Performed by: STUDENT IN AN ORGANIZED HEALTH CARE EDUCATION/TRAINING PROGRAM

## 2024-03-13 PROCEDURE — 77385 HC IMRT, SIMPLE: CPT | Mod: PN | Performed by: RADIOLOGY

## 2024-03-13 PROCEDURE — 1101F PT FALLS ASSESS-DOCD LE1/YR: CPT | Mod: CPTII,S$GLB,, | Performed by: STUDENT IN AN ORGANIZED HEALTH CARE EDUCATION/TRAINING PROGRAM

## 2024-03-13 PROCEDURE — 99999 PR PBB SHADOW E&M-EST. PATIENT-LVL III: CPT | Mod: PBBFAC,,, | Performed by: STUDENT IN AN ORGANIZED HEALTH CARE EDUCATION/TRAINING PROGRAM

## 2024-03-13 PROCEDURE — 77427 RADIATION TX MANAGEMENT X5: CPT | Mod: ,,, | Performed by: RADIOLOGY

## 2024-03-13 PROCEDURE — 77014 PR  CT GUIDANCE PLACEMENT RAD THERAPY FIELDS: CPT | Mod: 26,,, | Performed by: RADIOLOGY

## 2024-03-13 PROCEDURE — 3078F DIAST BP <80 MM HG: CPT | Mod: CPTII,S$GLB,, | Performed by: STUDENT IN AN ORGANIZED HEALTH CARE EDUCATION/TRAINING PROGRAM

## 2024-03-13 PROCEDURE — 3008F BODY MASS INDEX DOCD: CPT | Mod: CPTII,S$GLB,, | Performed by: STUDENT IN AN ORGANIZED HEALTH CARE EDUCATION/TRAINING PROGRAM

## 2024-03-13 PROCEDURE — 3288F FALL RISK ASSESSMENT DOCD: CPT | Mod: CPTII,S$GLB,, | Performed by: STUDENT IN AN ORGANIZED HEALTH CARE EDUCATION/TRAINING PROGRAM

## 2024-03-13 PROCEDURE — 99215 OFFICE O/P EST HI 40 MIN: CPT | Mod: S$GLB,,, | Performed by: STUDENT IN AN ORGANIZED HEALTH CARE EDUCATION/TRAINING PROGRAM

## 2024-03-13 PROCEDURE — 1160F RVW MEDS BY RX/DR IN RCRD: CPT | Mod: CPTII,S$GLB,, | Performed by: STUDENT IN AN ORGANIZED HEALTH CARE EDUCATION/TRAINING PROGRAM

## 2024-03-13 PROCEDURE — 63600175 PHARM REV CODE 636 W HCPCS: Mod: JZ,JG,PN | Performed by: STUDENT IN AN ORGANIZED HEALTH CARE EDUCATION/TRAINING PROGRAM

## 2024-03-13 PROCEDURE — 96402 CHEMO HORMON ANTINEOPL SQ/IM: CPT | Mod: PN

## 2024-03-13 PROCEDURE — 3074F SYST BP LT 130 MM HG: CPT | Mod: CPTII,S$GLB,, | Performed by: STUDENT IN AN ORGANIZED HEALTH CARE EDUCATION/TRAINING PROGRAM

## 2024-03-13 RX ORDER — PREDNISONE 5 MG/1
5 TABLET ORAL DAILY
Qty: 30 TABLET | Refills: 3 | Status: SHIPPED | OUTPATIENT
Start: 2024-03-13 | End: 2024-05-28 | Stop reason: SDUPTHER

## 2024-03-13 RX ORDER — COLCHICINE 0.6 MG/1
0.6 TABLET ORAL DAILY
COMMUNITY

## 2024-03-13 RX ADMIN — LEUPROLIDE ACETATE 22.5 MG: KIT at 11:03

## 2024-03-13 NOTE — PLAN OF CARE
Problem: Fatigue  Goal: Improved Activity Tolerance  Outcome: Ongoing, Progressing     Problem: Adult Inpatient Plan of Care  Goal: Plan of Care Review  Outcome: Met   Tolerated injection well today  Discharge instructions given and pt d/c to home per w/c  NAD

## 2024-03-14 PROCEDURE — 77385 HC IMRT, SIMPLE: CPT | Mod: PN | Performed by: RADIOLOGY

## 2024-03-14 PROCEDURE — 77014 PR  CT GUIDANCE PLACEMENT RAD THERAPY FIELDS: CPT | Mod: 26,,, | Performed by: RADIOLOGY

## 2024-03-15 ENCOUNTER — DOCUMENTATION ONLY (OUTPATIENT)
Dept: RADIATION ONCOLOGY | Facility: CLINIC | Age: 69
End: 2024-03-15
Payer: COMMERCIAL

## 2024-03-15 PROCEDURE — 77014 PR  CT GUIDANCE PLACEMENT RAD THERAPY FIELDS: CPT | Mod: 26,,, | Performed by: RADIOLOGY

## 2024-03-15 PROCEDURE — 77385 HC IMRT, SIMPLE: CPT | Mod: PN | Performed by: RADIOLOGY

## 2024-03-18 PROCEDURE — 77336 RADIATION PHYSICS CONSULT: CPT | Mod: PN | Performed by: RADIOLOGY

## 2024-03-19 ENCOUNTER — OFFICE VISIT (OUTPATIENT)
Dept: PULMONOLOGY | Facility: CLINIC | Age: 69
End: 2024-03-19
Attending: RADIOLOGY
Payer: COMMERCIAL

## 2024-03-19 VITALS
SYSTOLIC BLOOD PRESSURE: 126 MMHG | DIASTOLIC BLOOD PRESSURE: 82 MMHG | HEART RATE: 45 BPM | HEIGHT: 72 IN | BODY MASS INDEX: 28.01 KG/M2 | RESPIRATION RATE: 20 BRPM | WEIGHT: 206.81 LBS

## 2024-03-19 DIAGNOSIS — D38.1 NEOPLASM OF UNCERTAIN BEHAVIOR OF RIGHT UPPER LOBE OF LUNG: Primary | ICD-10-CM

## 2024-03-19 PROCEDURE — 1159F MED LIST DOCD IN RCRD: CPT | Mod: CPTII,S$GLB,, | Performed by: INTERNAL MEDICINE

## 2024-03-19 PROCEDURE — 3079F DIAST BP 80-89 MM HG: CPT | Mod: CPTII,S$GLB,, | Performed by: INTERNAL MEDICINE

## 2024-03-19 PROCEDURE — 99214 OFFICE O/P EST MOD 30 MIN: CPT | Mod: S$GLB,,, | Performed by: INTERNAL MEDICINE

## 2024-03-19 PROCEDURE — 3288F FALL RISK ASSESSMENT DOCD: CPT | Mod: CPTII,S$GLB,, | Performed by: INTERNAL MEDICINE

## 2024-03-19 PROCEDURE — 1160F RVW MEDS BY RX/DR IN RCRD: CPT | Mod: CPTII,S$GLB,, | Performed by: INTERNAL MEDICINE

## 2024-03-19 PROCEDURE — 99999 PR PBB SHADOW E&M-EST. PATIENT-LVL III: CPT | Mod: PBBFAC,,, | Performed by: INTERNAL MEDICINE

## 2024-03-19 PROCEDURE — 3008F BODY MASS INDEX DOCD: CPT | Mod: CPTII,S$GLB,, | Performed by: INTERNAL MEDICINE

## 2024-03-19 PROCEDURE — 1101F PT FALLS ASSESS-DOCD LE1/YR: CPT | Mod: CPTII,S$GLB,, | Performed by: INTERNAL MEDICINE

## 2024-03-19 PROCEDURE — 3074F SYST BP LT 130 MM HG: CPT | Mod: CPTII,S$GLB,, | Performed by: INTERNAL MEDICINE

## 2024-03-19 NOTE — PROGRESS NOTES
Subjective:      Patient ID: Frank Gay Jr. is a 68 y.o. male.    Chief Complaint: Mass    Mass        December 2023:  68-year-old male previously healthy who recently underwent a prostate MRI, subsequent biopsy and PET with newly diagnosed stage III carcinoma of the prostate.  He was evaluated by Urology and Heme-Onc as well as Rad Onc and is opting for medical rather than surgical treatment.  His PET imaging demonstrated some lung nodules which are further characterized by dedicated CT of the chest which showed scattered bilateral nodules with the largest being right lower lobe superior segment approximately 1 cm and then a lingular density of about 9 mm.  He is here today for that reason.  He is with his wife.  He is no pulmonary complaints or symptoms.  He is a never smoker but did work as a  for many years.  Imaging did not demonstrate any suspicious lesions between the prostate and the lung     March 2024  Here today for follow up of lung nodules as above  Continues treatment for prostate cancer  Tolerating well thus far no complications  CT of the chest done today for review    Review of Systems as per history of present illness otherwise negative  Objective:     Physical Exam   Constitutional: He is oriented to person, place, and time. He appears well-developed. No distress.   HENT:   Head: Normocephalic.   Cardiovascular: Normal rate and regular rhythm.   Pulmonary/Chest: Normal expansion, symmetric chest wall expansion, effort normal and breath sounds normal.   Musculoskeletal:      Cervical back: Neck supple.   Neurological: He is alert and oriented to person, place, and time.   Psychiatric: He has a normal mood and affect. His behavior is normal.   Nursing note and vitals reviewed.          3/19/2024    10:29 AM 3/13/2024    11:00 AM 3/13/2024    10:20 AM 1/30/2024    12:35 PM 1/29/2024     9:14 AM 12/15/2023    10:44 AM 12/15/2023    10:00 AM   Pulmonary Function Tests   SpO2   97 % 98 % 98 %  99 % 99 %   Height 6' (1.829 m)  6' (1.829 m) 6' (1.829 m) 6' (1.829 m) 6' (1.829 m)    Weight 93.8 kg (206 lb 12.7 oz) 94 kg (207 lb 3.7 oz) 94 kg (207 lb 3.7 oz) 94.3 kg (207 lb 14.3 oz) 93.9 kg (207 lb 0.2 oz) 95.9 kg (211 lb 6.7 oz)    BMI (Calculated) 28 28.1 28.1 28.2 28.1 28.7         Assessment:     1. 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 30 tablet 11    ammonium lactate (LAC-HYDRIN) 12 % lotion Apply topically 2 (two) times daily.      colchicine (COLCRYS) 0.6 mg tablet Take 0.6 mg by mouth once daily.      LIDOcaine-TETRAcaine 7-7 % Crea APPLY 2 GRAMS TO THE AFFECTED AREAS 3-4 TIMES DAILY, 30 g 0    predniSONE (DELTASONE) 5 MG tablet Take 1 tablet (5 mg total) by mouth once daily. Start with Abiraterone 30 tablet 3    [DISCONTINUED] predniSONE (DELTASONE) 5 MG tablet Take 1 tablet (5 mg total) by mouth once daily. Start with Abiraterone 30 tablet 3     No facility-administered encounter medications on file as of 3/19/2024.     Orders Placed This Encounter   Procedures    CT Chest Without Contrast     Standing Status:   Future     Standing Expiration Date:   3/19/2025     Order Specific Question:   May the Radiologist modify the order per protocol to meet the clinical needs of the patient?     Answer:   Yes     I personally reviewed CT chest images obtained today.  This shows no detrimental change of the right upper and lower lobe nodules measuring at 11 and 7 mm respectively.  Otherwise no detrimental findings elsewhere in the chest    Plan:     Stable lung nodules x2 in the right upper and lower lobes   Recommended repeat CT in 6 months for stability   Continue to follow up with Heme-Onc and primary care  Patient voiced understanding and agreement

## 2024-04-24 ENCOUNTER — LAB VISIT (OUTPATIENT)
Dept: LAB | Facility: HOSPITAL | Age: 69
End: 2024-04-24
Attending: STUDENT IN AN ORGANIZED HEALTH CARE EDUCATION/TRAINING PROGRAM
Payer: COMMERCIAL

## 2024-04-24 ENCOUNTER — OFFICE VISIT (OUTPATIENT)
Dept: RADIATION ONCOLOGY | Facility: CLINIC | Age: 69
End: 2024-04-24
Payer: COMMERCIAL

## 2024-04-24 VITALS
HEART RATE: 50 BPM | OXYGEN SATURATION: 98 % | TEMPERATURE: 98 F | WEIGHT: 205.25 LBS | RESPIRATION RATE: 16 BRPM | DIASTOLIC BLOOD PRESSURE: 67 MMHG | SYSTOLIC BLOOD PRESSURE: 140 MMHG | BODY MASS INDEX: 27.84 KG/M2

## 2024-04-24 DIAGNOSIS — C61 PROSTATE CANCER: ICD-10-CM

## 2024-04-24 DIAGNOSIS — C61 PROSTATE CANCER: Primary | ICD-10-CM

## 2024-04-24 LAB
ALBUMIN SERPL BCP-MCNC: 3.6 G/DL (ref 3.5–5.2)
ALP SERPL-CCNC: 71 U/L (ref 55–135)
ALT SERPL W/O P-5'-P-CCNC: 18 U/L (ref 10–44)
ANION GAP SERPL CALC-SCNC: 9 MMOL/L (ref 8–16)
AST SERPL-CCNC: 17 U/L (ref 10–40)
BASOPHILS # BLD AUTO: 0.04 K/UL (ref 0–0.2)
BASOPHILS NFR BLD: 0.7 % (ref 0–1.9)
BILIRUB SERPL-MCNC: 0.5 MG/DL (ref 0.1–1)
BUN SERPL-MCNC: 14 MG/DL (ref 8–23)
CALCIUM SERPL-MCNC: 9.3 MG/DL (ref 8.7–10.5)
CHLORIDE SERPL-SCNC: 104 MMOL/L (ref 95–110)
CO2 SERPL-SCNC: 28 MMOL/L (ref 23–29)
CREAT SERPL-MCNC: 0.8 MG/DL (ref 0.5–1.4)
DIFFERENTIAL METHOD BLD: ABNORMAL
EOSINOPHIL # BLD AUTO: 0.1 K/UL (ref 0–0.5)
EOSINOPHIL NFR BLD: 1.7 % (ref 0–8)
ERYTHROCYTE [DISTWIDTH] IN BLOOD BY AUTOMATED COUNT: 12.9 % (ref 11.5–14.5)
EST. GFR  (NO RACE VARIABLE): >60 ML/MIN/1.73 M^2
GLUCOSE SERPL-MCNC: 102 MG/DL (ref 70–110)
HCT VFR BLD AUTO: 36.5 % (ref 40–54)
HGB BLD-MCNC: 12.7 G/DL (ref 14–18)
IMM GRANULOCYTES # BLD AUTO: 0.06 K/UL (ref 0–0.04)
IMM GRANULOCYTES NFR BLD AUTO: 1 % (ref 0–0.5)
LYMPHOCYTES # BLD AUTO: 0.4 K/UL (ref 1–4.8)
LYMPHOCYTES NFR BLD: 7 % (ref 18–48)
MCH RBC QN AUTO: 31.3 PG (ref 27–31)
MCHC RBC AUTO-ENTMCNC: 34.8 G/DL (ref 32–36)
MCV RBC AUTO: 90 FL (ref 82–98)
MONOCYTES # BLD AUTO: 0.8 K/UL (ref 0.3–1)
MONOCYTES NFR BLD: 12.7 % (ref 4–15)
NEUTROPHILS # BLD AUTO: 4.5 K/UL (ref 1.8–7.7)
NEUTROPHILS NFR BLD: 76.9 % (ref 38–73)
NRBC BLD-RTO: 0 /100 WBC
PLATELET # BLD AUTO: 216 K/UL (ref 150–450)
PMV BLD AUTO: 9.1 FL (ref 9.2–12.9)
POTASSIUM SERPL-SCNC: 4.1 MMOL/L (ref 3.5–5.1)
PROT SERPL-MCNC: 6.5 G/DL (ref 6–8.4)
RBC # BLD AUTO: 4.06 M/UL (ref 4.6–6.2)
SODIUM SERPL-SCNC: 141 MMOL/L (ref 136–145)
WBC # BLD AUTO: 5.89 K/UL (ref 3.9–12.7)

## 2024-04-24 PROCEDURE — 36415 COLL VENOUS BLD VENIPUNCTURE: CPT | Mod: PN | Performed by: STUDENT IN AN ORGANIZED HEALTH CARE EDUCATION/TRAINING PROGRAM

## 2024-04-24 PROCEDURE — 3077F SYST BP >= 140 MM HG: CPT | Mod: CPTII,S$GLB,, | Performed by: RADIOLOGY

## 2024-04-24 PROCEDURE — 1159F MED LIST DOCD IN RCRD: CPT | Mod: CPTII,S$GLB,, | Performed by: RADIOLOGY

## 2024-04-24 PROCEDURE — 1126F AMNT PAIN NOTED NONE PRSNT: CPT | Mod: CPTII,S$GLB,, | Performed by: RADIOLOGY

## 2024-04-24 PROCEDURE — 99024 POSTOP FOLLOW-UP VISIT: CPT | Mod: S$GLB,,, | Performed by: RADIOLOGY

## 2024-04-24 PROCEDURE — 1101F PT FALLS ASSESS-DOCD LE1/YR: CPT | Mod: CPTII,S$GLB,, | Performed by: RADIOLOGY

## 2024-04-24 PROCEDURE — 3078F DIAST BP <80 MM HG: CPT | Mod: CPTII,S$GLB,, | Performed by: RADIOLOGY

## 2024-04-24 PROCEDURE — 80053 COMPREHEN METABOLIC PANEL: CPT | Mod: PN | Performed by: STUDENT IN AN ORGANIZED HEALTH CARE EDUCATION/TRAINING PROGRAM

## 2024-04-24 PROCEDURE — 3288F FALL RISK ASSESSMENT DOCD: CPT | Mod: CPTII,S$GLB,, | Performed by: RADIOLOGY

## 2024-04-24 PROCEDURE — 85025 COMPLETE CBC W/AUTO DIFF WBC: CPT | Mod: PN | Performed by: STUDENT IN AN ORGANIZED HEALTH CARE EDUCATION/TRAINING PROGRAM

## 2024-04-24 PROCEDURE — 99999 PR PBB SHADOW E&M-EST. PATIENT-LVL III: CPT | Mod: PBBFAC,,, | Performed by: RADIOLOGY

## 2024-04-24 PROCEDURE — 3008F BODY MASS INDEX DOCD: CPT | Mod: CPTII,S$GLB,, | Performed by: RADIOLOGY

## 2024-04-24 NOTE — PROGRESS NOTES
[facility] Department of Radiation Oncology  Follow Up Visit Note    Diagnosis:  Frank Gay Jr. is a 68 y.o. male with:  clinical [id number] Grade Group 3, PSA 15.48,  Very High Risk adenocarcinoma of the prostate.  He completed ADT + radiation 3/15/24.     PSMA/PET identified Superior Right Lower Lobe Lung nodule, CT chest in 3/8/24 revealed stable lung nodule, plan for 6 month f/u.       Oncologic History:  Oncology History   Very High Risk Prostate Cancer   11/16/2023 Initial Diagnosis    Prostate cancer: Patient's PSA previously normal until 15.48 (7/2023) -> 22.2 (9/2023).     10/3/23: MRI Prostate shows PIRADS 5 lesion 32 x 10 mm in medial and lateral aspects of peripheral zones with minimal invasion of the right seminal vesicles, no perineural or extracapsular extension visualized. 8 mm nodules visualized on left iliac bone  11/8/23: Prostate biopsy shows [place] 4+3=7, negative for perineural invasion, positive for LVI  11/14/23: PSMA PET shows activity confined to the prostate gland and seminal vesicles consistent with known malignancy, no lymph node metastases nor distant metastatic disease. Spiculated appearing lung nodule concerning for incidental bronchogenic carcinoma. No irregularity corresponding to iliac lesion on MRI.      11/16/2023 Cancer Staged    Staging form: Prostate, AJCC 8th Edition  - Clinical stage from 11/16/2023: Stage IIIB (cT3b, cN0, cM0, PSA: 15.5, Grade Group: 3)     2/6/2024 - 3/15/2024 Radiation Therapy    Treating physician: Radha Mortensen and Marlo  Total Dose: 55 Gy  Fractions: 28     2/6/2024 - 3/15/2024 Radiation Therapy    Treatment Site Ref. ID Energy Dose/Fx (Gy) #Fx Dose Correction (Gy) Total Dose (Gy) Start Date End Date Elapsed Days   IM Prostate Prostate 6X 2.5 28 / 28 0 70 2/6/2024 3/15/2024 38       Treating physician: [place]  Total Dose: 70 Gy  Fractions: 28          Interval History  The patient presents today for a regularly scheduled  follow up visit.  He was last seen in our clinic on 3/15/24.   Since that time, he has had decreased nocturia. No new issues.      PSA 3/8/24 0.07    Review of Systems:   Constistutional: Denies fever, chills. No recent weight changes. Denies nights sweats.  Eyes: No redness or dryness.  ENT: Denies dry mouth. No ulcers.  Card: Denies chest pain. No PND, or orthopnea.   Resp: Denies cough. Denies shortness of breath.  Gastro: Denies nausea or vomiting, constipation, diarrhea.  Genito: No kidney stones. Denies dysuria.  Skin: No rash, psoriasis, or alopecia.  Musculoskeletal:No myalgia. No muscle weakness.  Neuro: No numbness or tingling. No history of seizures or psychosis.  Psych: Denies depression. Denies anxiety.  Endo: Denies history of diabetes. No thyroid disease.  Heme: Denies anemia. No blood clots or bleeding diathesis.  Allergic: Denies seasonal allergies.   All other systems negative    Social History:  Social History     Tobacco Use    Smoking status: Never    Smokeless tobacco: Former     Quit date: 7/1/2017   Substance Use Topics    Alcohol use: No    Drug use: No       Family History:  Cancer-related family history includes Cancer in his father; Prostate cancer in his father.    Exam:  Vitals:    04/24/24 0926   BP: (!) 140/67   Pulse: (!) 50   Resp: 16   Temp: 98.1 °F (36.7 °C)   SpO2: 98%   Weight: 93.1 kg (205 lb 4 oz)     Constitutional: Pleasant 68 y.o. male in no acute distress.  Well nourished. Well groomed.   HEENT: Normocephalic and atraumatic   Lungs: No audible wheezing.  Normal effort.   Musculoskeletal: No gross MSK deformities. Ambulates  Skin: No rashes appreciated.   Psych: Alert and oriented with appropriate mood and affect.  Neuro:   Grossly normal.    Data Review:    Independent Interpretation of Test(s): PSA from 3/8/24 was personally reviewed as detailed above    Assessment:  Mr. Gay is a 68 year old with very high risk adenocarcinoma of the prostate who has completed ADT +  radiation 3/15/24.  ECOG: (0) Fully active, able to carry on all predisease performance without restriction    Plan:  Follow up in 6 months  He was given our contact information, and he was told that he could call our clinic at anytime if he has any questions or concerns.  Follow up with other providers as directed

## 2024-04-29 ENCOUNTER — PATIENT MESSAGE (OUTPATIENT)
Dept: ADMINISTRATIVE | Facility: OTHER | Age: 69
End: 2024-04-29
Payer: COMMERCIAL

## 2024-05-28 DIAGNOSIS — C61 PROSTATE CANCER: ICD-10-CM

## 2024-05-28 RX ORDER — PREDNISONE 5 MG/1
5 TABLET ORAL DAILY
Qty: 30 TABLET | Refills: 3 | Status: ACTIVE | OUTPATIENT
Start: 2024-05-28

## 2024-05-28 RX ORDER — PREDNISONE 5 MG/1
5 TABLET ORAL DAILY
Qty: 30 TABLET | Refills: 3 | Status: CANCELLED | OUTPATIENT
Start: 2024-05-28

## 2024-06-10 ENCOUNTER — OFFICE VISIT (OUTPATIENT)
Dept: CARDIOLOGY | Facility: CLINIC | Age: 69
End: 2024-06-10
Payer: COMMERCIAL

## 2024-06-10 VITALS
DIASTOLIC BLOOD PRESSURE: 78 MMHG | HEART RATE: 42 BPM | HEIGHT: 72 IN | SYSTOLIC BLOOD PRESSURE: 151 MMHG | WEIGHT: 209.44 LBS | BODY MASS INDEX: 28.37 KG/M2

## 2024-06-10 DIAGNOSIS — E78.5 HYPERLIPIDEMIA, UNSPECIFIED HYPERLIPIDEMIA TYPE: ICD-10-CM

## 2024-06-10 DIAGNOSIS — Z79.02 LONG TERM CURRENT USE OF ANTITHROMBOTICS/ANTIPLATELETS: ICD-10-CM

## 2024-06-10 DIAGNOSIS — R00.1 BRADYCARDIA WITH 41-50 BEATS PER MINUTE: Primary | ICD-10-CM

## 2024-06-10 DIAGNOSIS — Z86.718 PERSONAL HISTORY OF VENOUS THROMBOSIS AND EMBOLISM: ICD-10-CM

## 2024-06-10 DIAGNOSIS — G47.33 OBSTRUCTIVE SLEEP APNEA: Chronic | ICD-10-CM

## 2024-06-10 PROCEDURE — 1126F AMNT PAIN NOTED NONE PRSNT: CPT | Mod: CPTII,S$GLB,, | Performed by: INTERNAL MEDICINE

## 2024-06-10 PROCEDURE — 99214 OFFICE O/P EST MOD 30 MIN: CPT | Mod: S$GLB,,, | Performed by: INTERNAL MEDICINE

## 2024-06-10 PROCEDURE — 3078F DIAST BP <80 MM HG: CPT | Mod: CPTII,S$GLB,, | Performed by: INTERNAL MEDICINE

## 2024-06-10 PROCEDURE — 1101F PT FALLS ASSESS-DOCD LE1/YR: CPT | Mod: CPTII,S$GLB,, | Performed by: INTERNAL MEDICINE

## 2024-06-10 PROCEDURE — 3008F BODY MASS INDEX DOCD: CPT | Mod: CPTII,S$GLB,, | Performed by: INTERNAL MEDICINE

## 2024-06-10 PROCEDURE — 3077F SYST BP >= 140 MM HG: CPT | Mod: CPTII,S$GLB,, | Performed by: INTERNAL MEDICINE

## 2024-06-10 PROCEDURE — 1159F MED LIST DOCD IN RCRD: CPT | Mod: CPTII,S$GLB,, | Performed by: INTERNAL MEDICINE

## 2024-06-10 PROCEDURE — 3288F FALL RISK ASSESSMENT DOCD: CPT | Mod: CPTII,S$GLB,, | Performed by: INTERNAL MEDICINE

## 2024-06-10 PROCEDURE — 99999 PR PBB SHADOW E&M-EST. PATIENT-LVL III: CPT | Mod: PBBFAC,,, | Performed by: INTERNAL MEDICINE

## 2024-06-10 NOTE — PROGRESS NOTES
Subjective:    Patient ID:  Frank Gay Jr. is a 68 y.o. male patient here for evaluation Follow-up      History of Present Illness:   Follow-up bradycardia, asymptomatic.  Echo EF in the past 60% with grade 2 diastolic dysfunction.  Date 10/2023.       Follows with Heme-Onc for prostate cancer, stable.        Review of patient's allergies indicates:   Allergen Reactions    Celebrex  [celecoxib] Blisters, Dermatitis, Edema, Hallucinations, Hives, Itching and Swelling    Dairy aid  [lactase]      Other reaction(s): stomach cramps  Other reaction(s): Itching  Other reaction(s): Diarrhea    Eliquis  [apixaban] Blisters, Edema, Hallucinations, Other (See Comments) and Swelling    Influenza a (h1n1) vac 09 (pf)     Lactose        Past Medical History:   Diagnosis Date    Arthritis     Cancer     PROSTATE    Elevated PSA     Genetic testing 12/21/2023    [employer] Multi Cancer Panel (70 genes) - NEGATIVE    Gout, unspecified     Personal history of venous thrombosis and embolism     After trauma in the 70's, he had a DVT with a PE    Pulmonary embolism     Sleep apnea     RESOLVED WITH UPPP AND SEPTOPLASTY/ NO MACHINE     Past Surgical History:   Procedure Laterality Date    ADENOIDECTOMY      APPENDECTOMY      BIOPSY, PROSTATE, USING PROSTATE MAPPING N/A 11/8/2023    Procedure: BIOPSY, PROSTATE, USING PROSTATE MAPPING;  Surgeon: Juan Calhoun MD;  Location: [facility];  Service: Urology;  Laterality: N/A;    ELBOW SURGERY  05/2016    EYE SURGERY      Eyelid    JOINT REPLACEMENT Left 2013    KNEE    KNEE SURGERY      left knee    periodontal  03/2018    PROSTATE BIOPSY  2018    TONSILLECTOMY      TOTAL KNEE ARTHROPLASTY Right 12/18/2019    Procedure: ARTHROPLASTY, KNEE, TOTAL-DEPUY-SIGMA;  Surgeon: Ken Amezcua III, MD;  Location: [facility];  Service: Orthopedics;  Laterality: Right;    UVULOPALATOPHARYNGOPLASTY      AND SEPTOPLASTY     Social History     Tobacco Use    Smoking status: Never    Smokeless tobacco: Former      Quit date: 7/1/2017   Substance Use Topics    Alcohol use: No    Drug use: No        Review of Systems:    As noted in HPI in addition      REVIEW OF SYSTEMS  Review of Systems   Constitutional: Negative for decreased appetite, diaphoresis, night sweats, weight gain and weight loss.   HENT:  Negative for nosebleeds and odynophagia.    Eyes:  Negative for double vision and photophobia.   Cardiovascular:  Negative for chest pain, claudication, cyanosis, dyspnea on exertion, irregular heartbeat, leg swelling, near-syncope, orthopnea, palpitations, paroxysmal nocturnal dyspnea and syncope.   Respiratory:  Negative for cough, hemoptysis, shortness of breath and wheezing.    Hematologic/Lymphatic: Negative for adenopathy.   Skin:  Negative for flushing, skin cancer and suspicious lesions.   Musculoskeletal:  Negative for gout, myalgias and neck pain.   Gastrointestinal:  Negative for abdominal pain, heartburn, hematemesis and hematochezia.   Genitourinary:  Negative for bladder incontinence, hesitancy and nocturia.   Neurological:  Negative for focal weakness, headaches, light-headedness and paresthesias.   Psychiatric/Behavioral:  Negative for memory loss and substance abuse.               Objective:        Vitals:    06/10/24 1019   BP: (!) 151/78   Pulse: (!) 42       Lab Results   Component Value Date    WBC 5.89 04/24/2024    HGB 12.7 (L) 04/24/2024    HCT 36.5 (L) 04/24/2024     04/24/2024    CHOL 188 07/22/2023    TRIG 78 07/22/2023    HDL 50 07/22/2023    ALT 18 04/24/2024    AST 17 04/24/2024     04/24/2024    K 4.1 04/24/2024     04/24/2024    CREATININE 0.8 04/24/2024    BUN 14 04/24/2024    CO2 28 04/24/2024    TSH 1.10 06/08/2009    PSA 5.7 (H) 01/02/2020    INR 1.0 10/16/2023    HGBA1C 5.4 07/22/2023        ECHOCARDIOGRAM RESULTS  Results for orders placed in visit on 10/31/23    Echo    Interpretation Summary    Left Ventricle: The left ventricle is normal in size. Normal wall  thickness. Normal wall motion. There is normal systolic function with a visually estimated ejection fraction of 60 - 65%. Grade II diastolic dysfunction.    Right Ventricle: Normal right ventricular cavity size. Wall thickness is normal. Right ventricle wall motion  is normal. Systolic function is normal.    Left Atrium: Left atrium is mildly dilated.    Mitral Valve: There is moderate regurgitation.    Pulmonary Artery: There is borderline elevated pulmonary hypertension. The estimated pulmonary artery systolic pressure is 30 mmHg.    IVC/SVC: Normal venous pressure at 3 mmHg.    No results found for this or any previous visit.          CURRENT/PREVIOUS VISIT EKG  Results for orders placed or performed in visit on 10/20/23   IN OFFICE EKG 12-LEAD (to [place])    Collection Time: 10/20/23  8:32 AM    Narrative    Test Reason : Z01.818,R00.1,    Vent. Rate : 046 BPM     Atrial Rate : 046 BPM     P-R Int : 166 ms          QRS Dur : 090 ms      QT Int : 460 ms       P-R-T Axes : 064 039 064 degrees     QTc Int : 402 ms    Sinus bradycardia  Otherwise normal ECG  When compared with ECG of 16-OCT-2023 09:38,  No significant change was found  Confirmed by Brittany Berger MD ([id number]) on 10/21/2023 7:48:27 AM    Referred By: ZORAIDA QUINTANILLA           Confirmed By:Brittany Berger MD     No valid procedures specified.   No results found for this or any previous visit.    No valid procedures specified.    PHYSICAL EXAM  GENERAL: well built, well nourished, well-developed in no apparent distress alert and oriented.   HEENT: Normocephalic. Pupils normal and conjunctivae normal.  Mucous membranes normal, no cyanosis or icterus, trachea central,no pallor or icterus is noted..   NECK: No JVD. No bruit..   THYROID: Thyroid not enlarged. No nodules present..   CARDIAC:  Normal S1-S2.  No murmur rub click or gallop.  PMI nondisplaced.  CHEST ANATOMY: normal.   LUNGS: Clear to auscultation. No wheezing or rhonchi..   ABDOMEN: Soft no masses or  organomegaly.  No abdomen pulsations or bruits.  Normal bowel sounds. No pulsations and no masses felt, No guarding or rebound.   URINARY: No mcknight catheter   EXTREMITIES: No cyanosis, clubbing or edema noted at this time., no calf tenderness bilaterally.   PERIPHERAL VASCULAR SYSTEM: Good palpable distal pulses.  2+ femoral, popliteal and pedal pulses.  No bruits    CENTRAL NERVOUS SYSTEM: No focal motor or sensory deficits noted.   SKIN: Skin without lesions, moist, well perfused.   MUSCLE STRENGTH & TONE: No noteable weakness, atrophy or abnormal movement    I HAVE REVIEWED :    The vital signs, nurses notes, and all the pertinent radiology and labs.         Current Outpatient Medications   Medication Instructions    abiraterone (ZYTIGA) 250 mg, Oral, Daily    ammonium lactate (LAC-HYDRIN) 12 % lotion Topical (Top), 2 times daily    colchicine (COLCRYS) 0.6 mg, Oral, Daily    LIDOcaine-TETRAcaine 7-7 % Crea APPLY 2 GRAMS TO THE AFFECTED AREAS 3-4 TIMES DAILY,    predniSONE (DELTASONE) 5 mg, Oral, Daily, Start with Abiraterone          Assessment:     Asymptomatic sinus bradycardia.  Echo and Holter monitor stable 10/2022.       History prostate cancer        Plan:    Remains asymptomatic.  Cardiac exam review of systems stable.  Follow up with us in about 6 months          No follow-ups on file.

## 2024-06-18 NOTE — PROGRESS NOTES
[facility]  FOLLOW-UP VISIT.     Reason for visit: Establish Care     Best Contact Phone Number(s): [phone] (home)      Cancer/Stage/TNM:    Cancer Staging   Very High Risk Prostate Cancer  Staging form: Prostate, AJCC 8th Edition  - Clinical stage from 11/16/2023: Stage IIIB (cT3b, cN0, cM0, PSA: 15.5, Grade Group: 3) - Signed by Cholo Mortensen MD on 11/16/2023       Oncology History   Very High Risk Prostate Cancer   11/16/2023 Initial Diagnosis    Prostate cancer: Patient's PSA previously normal until 15.48 (7/2023) -> 22.2 (9/2023).     10/3/23: MRI Prostate shows PIRADS 5 lesion 32 x 10 mm in medial and lateral aspects of peripheral zones with minimal invasion of the right seminal vesicles, no perineural or extracapsular extension visualized. 8 mm nodules visualized on left iliac bone  11/8/23: Prostate biopsy shows Viktoria 4+3=7, negative for perineural invasion, positive for LVI  11/14/23: PSMA PET shows activity confined to the prostate gland and seminal vesicles consistent with known malignancy, no lymph node metastases nor distant metastatic disease. Spiculated appearing lung nodule concerning for incidental bronchogenic carcinoma. No irregularity corresponding to iliac lesion on MRI.      11/16/2023 Cancer Staged    Staging form: Prostate, AJCC 8th Edition  - Clinical stage from 11/16/2023: Stage IIIB (cT3b, cN0, cM0, PSA: 15.5, Grade Group: 3)     2/6/2024 - 3/15/2024 Radiation Therapy    Treating physician: Radha Mortensen and Marlo  Total Dose: 55 Gy  Fractions: 28     2/6/2024 - 3/15/2024 Radiation Therapy    Treatment Site Ref. ID Energy Dose/Fx (Gy) #Fx Dose Correction (Gy) Total Dose (Gy) Start Date End Date Elapsed Days   IM Prostate Prostate 6X 2.5 28 / 28 0 70 2/6/2024 3/15/2024 38       Treating physician: Balbina  Total Dose: 70 Gy  Fractions: 28        Interval History: Frank Gay Jr. is a 68 y.o. male with localized prostate cancer who presents for follow  up. Tolerating treatment well. Having hot flashes worse while inside. Has remained active in the garden.     Patient presents to clinic with wife, Laisha.  ECOG Performance Status is 0.    ROS:  A complete 12-point review of systems was reviewed and is negative except as mentioned above.     Past Medical History:   Past Medical History:   Diagnosis Date    Arthritis     Cancer     PROSTATE    Elevated PSA     Genetic testing 12/21/2023    [employer] Multi Cancer Panel (70 genes) - NEGATIVE    Gout, unspecified     Personal history of venous thrombosis and embolism     After trauma in the 70's, he had a DVT with a PE    Pulmonary embolism     Sleep apnea     RESOLVED WITH UPPP AND SEPTOPLASTY/ NO MACHINE        Allergies:   Review of patient's allergies indicates:   Allergen Reactions    Celebrex  [celecoxib] Blisters, Dermatitis, Edema, Hallucinations, Hives, Itching and Swelling    Dairy aid  [lactase]      Other reaction(s): stomach cramps  Other reaction(s): Itching  Other reaction(s): Diarrhea    Eliquis  [apixaban] Blisters, Edema, Hallucinations, Other (See Comments) and Swelling    Influenza a (h1n1) vac 09 (pf)     Lactose         Medications:   Current Outpatient Medications   Medication Sig Dispense Refill    abiraterone (ZYTIGA) 250 mg Tab Take 1 tablet (250 mg total) by mouth once daily. 30 tablet 11    ammonium lactate (LAC-HYDRIN) 12 % lotion Apply topically 2 (two) times daily.      predniSONE (DELTASONE) 5 MG tablet Take 1 tablet (5 mg total) by mouth once daily. Start with Abiraterone 30 tablet 3    colchicine (COLCRYS) 0.6 mg tablet Take 0.6 mg by mouth once daily. (Patient not taking: Reported on 6/19/2024)      LIDOcaine-TETRAcaine 7-7 % Crea APPLY 2 GRAMS TO THE AFFECTED AREAS 3-4 TIMES DAILY, (Patient not taking: Reported on 6/19/2024) 30 g 0     No current facility-administered medications for this visit.        Physical Exam:   BP (!) 146/78 (BP Location: Right arm, Patient Position: Sitting,  "BP Method: Medium (Automatic))   Pulse (!) 51   Temp 97.3 °F (36.3 °C) (Temporal)   Resp 16   Ht 6' 2" (1.88 m)   Wt 95.8 kg (211 lb 3.2 oz)   SpO2 98%   BMI 27.12 kg/m²      Physical Exam  Constitutional:       Appearance: Normal appearance.   HENT:      Head: Normocephalic and atraumatic.      Mouth/Throat:      Mouth: Mucous membranes are moist.   Eyes:      Extraocular Movements: Extraocular movements intact.      Pupils: Pupils are equal, round, and reactive to light.   Cardiovascular:      Rate and Rhythm: Normal rate and regular rhythm.      Pulses: Normal pulses.      Heart sounds: No murmur heard.  Pulmonary:      Effort: Pulmonary effort is normal. No respiratory distress.      Breath sounds: Normal breath sounds.   Abdominal:      General: Abdomen is flat. There is no distension.      Palpations: Abdomen is soft.      Tenderness: There is no abdominal tenderness.   Musculoskeletal:         General: No swelling or tenderness. Normal range of motion.      Cervical back: Normal range of motion. No rigidity.   Skin:     General: Skin is warm and dry.      Coloration: Skin is not jaundiced.   Neurological:      General: No focal deficit present.      Mental Status: He is alert and oriented to person, place, and time.   Psychiatric:         Mood and Affect: Mood normal.         Behavior: Behavior normal.           Labs:   Lab Results   Component Value Date    WBC 4.80 06/19/2024    HGB 12.5 (L) 06/19/2024    HCT 36.4 (L) 06/19/2024    MCV 90 06/19/2024     06/19/2024       Lab Results   Component Value Date     06/19/2024    K 4.8 06/19/2024     06/19/2024    CO2 28 06/19/2024    BUN 16 06/19/2024    CREATININE 0.8 06/19/2024    ALBUMIN 3.6 06/19/2024    BILITOT 0.3 06/19/2024    ALKPHOS 79 06/19/2024    AST 18 06/19/2024    ALT 16 06/19/2024       Imaging:   CT CHEST WITHOUT CONTRAST  Narrative: EXAMINATION:  CT CHEST WITHOUT CONTRAST    CLINICAL HISTORY:  Lung nodule, > 8mm;ION " Robotic Protocol; Malignant neoplasm of prostate    TECHNIQUE:  Low dose axial images, sagittal and coronal reformations were obtained from the thoracic inlet to the lung bases. Contrast was not administered.    COMPARISON:  11/22/2023    FINDINGS:  There is a solid spiculated nodule within the superior segment of the left lower lobe measuring 1.1 cm, without significant change (series 4, image 159).    A solid 7 mm nodule in the inferior right upper lobe along the minor fissure is also unchanged (series, image 257).    The heart size is normal without pericardial effusion.  There is no hilar or mediastinal lymphadenopathy.    Degenerative change of the spine typical for age is present.  Impression: Unchanged pulmonary nodules, including a spiculated 1.1 cm superior segment right lower lobe nodule for which neoplasm is not excluded.  Additional follow-up in 6-12 months is recommended.    Electronically signed by: Adriano Lopez MD  Date:    03/08/2024  Time:    11:24  DXA Bone Density Axial Skeleton 1 or more sites  Narrative: EXAMINATION:  DXA BONE DENSITY AXIAL SKELETON 1 OR MORE SITES    CLINICAL HISTORY:  Long term (current) use of other agents affecting estrogen receptors and estrogen levels    TECHNIQUE:  DXA scanning was performed over the left hip and lumbar spine.  Review of the images confirms satisfactory positioning and technique.    COMPARISON:  None    FINDINGS:  The L1 to L4 vertebral bone mineral density is equal to 1.165 g/cm squared with a T score of 0.7.    The left femoral neck bone mineral density is equal to 0.691 g/cm squared with a T score of -1.8.    There is a 18% risk of a major osteoporotic fracture and a 3.6% risk of hip fracture in the next 10 years (FRAX).  Impression: Osteopenia of the left hip, normal bone mineral density of the lumbar spine    Consider FDA approved medical therapies in postmenopausal women and men aged 50 years and older, based on the following:    *A hip or  vertebral (clinical or morphometric) fracture  *T score less than or equal to -2.5 at the femoral neck or spine after appropriate evaluation to exclude secondary causes.  *Low bone mass -- also known as osteopenia (T score between -1.0 and -2.5 at the femoral neck or spine) and a 10 year probability of hip fracture greater than or equal to 3% or a 10 year probability of major osteoporosis-related fracture greater than or equal to 20% based on the [place]-adapted WHO algorithm.  *Clinicians judgment and/or patient preference may indicate treatment for people with 10 year fracture probabilities is above or below these levels.    Electronically signed by: Gilles Belcher MD  Date:    03/08/2024  Time:    09:24            Diagnoses:       1. Very High Risk Prostate Cancer    2. Osteopenia of left hip    3. Androgen deprivation therapy    4. Pulmonary nodule    5. Hepatic cyst              Assessment and Plan:     #  Very High Risk Prostate Cancer, Stage IIIB ([id number]) - Based off of cT3b patient has very high risk disease. PSMA PET scan shows bronchogenic lung lesions and CT Chest shows several pleural based lesions. After pulmonary evaluation, opt to follow with surveillance and considered less likely malignancy without other sites of metastases and appearance is inconsistent with primary lung malignancy. As such, plan to treat as localized prostate cancer with ADT + XRT, and patient meets high risk criteria per [place] for the addition of Abiraterone. Genetic counseling negative.      Patient will be starting radiation therapy 2/7/24 - 3/15 x28 fractions. Tolerating Zytiga well. Lupron today, next due 9/2024. DEXA scan shows osteopenia of left hip, encourage 4 servings of calcium daily + vit D.  - repeat cmp in 6w and 12w on Abiraterone  - return to clinic in 3m with next dose of Lupron    # Osteopenia - Left hip osteopenia, notes occasional pain but not consistent     # ADT - Hot flashes noted, patient denies need for  pharmaceutical therapy or acupuncture.     # Lung Nodule - No smoking history, no cough, does have high risk work exposure. Established care with Dr. Mas and will continue with active surveillance. Repeat CT CAP with stable pulmonary findings - fu with Dr. Mas as scheduled 9/2024    # Hepatic lesions - <1 cm cysts seen on PSMA and CT Chest. Restaging A/P not complete, will follow on next imaging study    he will return to clinic in 3m, but knows to call in the interim if symptoms change or should a problem arise.    Monica Carnes MD  Hematology/Oncology  [facility]      Med Onc Chart Routing      Follow up with physician 3 months. lavell   Follow up with CAROLINE    Infusion scheduling note   lupron after visit   Injection scheduling note    Labs CBC, CMP and PSA   Scheduling:  Preferred lab:  Lab interval:  6w: cbc/cmp, 12w: cbc, cmp, psa   Imaging    Pharmacy appointment    Other referrals

## 2024-06-19 ENCOUNTER — LAB VISIT (OUTPATIENT)
Dept: LAB | Facility: HOSPITAL | Age: 69
End: 2024-06-19
Attending: STUDENT IN AN ORGANIZED HEALTH CARE EDUCATION/TRAINING PROGRAM
Payer: COMMERCIAL

## 2024-06-19 ENCOUNTER — OFFICE VISIT (OUTPATIENT)
Dept: HEMATOLOGY/ONCOLOGY | Facility: CLINIC | Age: 69
End: 2024-06-19
Payer: COMMERCIAL

## 2024-06-19 ENCOUNTER — INFUSION (OUTPATIENT)
Dept: INFUSION THERAPY | Facility: HOSPITAL | Age: 69
End: 2024-06-19
Attending: STUDENT IN AN ORGANIZED HEALTH CARE EDUCATION/TRAINING PROGRAM
Payer: COMMERCIAL

## 2024-06-19 VITALS
RESPIRATION RATE: 16 BRPM | OXYGEN SATURATION: 98 % | HEIGHT: 74 IN | SYSTOLIC BLOOD PRESSURE: 146 MMHG | BODY MASS INDEX: 27.1 KG/M2 | DIASTOLIC BLOOD PRESSURE: 78 MMHG | HEART RATE: 51 BPM | WEIGHT: 211.19 LBS | TEMPERATURE: 97 F

## 2024-06-19 VITALS
DIASTOLIC BLOOD PRESSURE: 78 MMHG | SYSTOLIC BLOOD PRESSURE: 146 MMHG | WEIGHT: 209.44 LBS | TEMPERATURE: 97 F | BODY MASS INDEX: 26.89 KG/M2 | HEART RATE: 51 BPM | RESPIRATION RATE: 16 BRPM | OXYGEN SATURATION: 98 %

## 2024-06-19 DIAGNOSIS — C61 PROSTATE CANCER: ICD-10-CM

## 2024-06-19 DIAGNOSIS — R91.1 PULMONARY NODULE: ICD-10-CM

## 2024-06-19 DIAGNOSIS — M85.852 OSTEOPENIA OF LEFT HIP: ICD-10-CM

## 2024-06-19 DIAGNOSIS — C61 PROSTATE CANCER: Primary | ICD-10-CM

## 2024-06-19 DIAGNOSIS — Z79.818 ANDROGEN DEPRIVATION THERAPY: ICD-10-CM

## 2024-06-19 DIAGNOSIS — K76.89 HEPATIC CYST: ICD-10-CM

## 2024-06-19 LAB
ALBUMIN SERPL BCP-MCNC: 3.6 G/DL (ref 3.5–5.2)
ALP SERPL-CCNC: 79 U/L (ref 55–135)
ALT SERPL W/O P-5'-P-CCNC: 16 U/L (ref 10–44)
ANION GAP SERPL CALC-SCNC: 8 MMOL/L (ref 8–16)
AST SERPL-CCNC: 18 U/L (ref 10–40)
BASOPHILS # BLD AUTO: 0.05 K/UL (ref 0–0.2)
BASOPHILS NFR BLD: 1 % (ref 0–1.9)
BILIRUB SERPL-MCNC: 0.3 MG/DL (ref 0.1–1)
BUN SERPL-MCNC: 16 MG/DL (ref 8–23)
CALCIUM SERPL-MCNC: 9.6 MG/DL (ref 8.7–10.5)
CHLORIDE SERPL-SCNC: 106 MMOL/L (ref 95–110)
CO2 SERPL-SCNC: 28 MMOL/L (ref 23–29)
COMPLEXED PSA SERPL-MCNC: 0.01 NG/ML (ref 0–4)
CREAT SERPL-MCNC: 0.8 MG/DL (ref 0.5–1.4)
DIFFERENTIAL METHOD BLD: ABNORMAL
EOSINOPHIL # BLD AUTO: 0.2 K/UL (ref 0–0.5)
EOSINOPHIL NFR BLD: 3.3 % (ref 0–8)
ERYTHROCYTE [DISTWIDTH] IN BLOOD BY AUTOMATED COUNT: 12.1 % (ref 11.5–14.5)
EST. GFR  (NO RACE VARIABLE): >60 ML/MIN/1.73 M^2
GLUCOSE SERPL-MCNC: 102 MG/DL (ref 70–110)
HCT VFR BLD AUTO: 36.4 % (ref 40–54)
HGB BLD-MCNC: 12.5 G/DL (ref 14–18)
IMM GRANULOCYTES # BLD AUTO: 0.06 K/UL (ref 0–0.04)
IMM GRANULOCYTES NFR BLD AUTO: 1.3 % (ref 0–0.5)
LYMPHOCYTES # BLD AUTO: 0.5 K/UL (ref 1–4.8)
LYMPHOCYTES NFR BLD: 10.6 % (ref 18–48)
MCH RBC QN AUTO: 31 PG (ref 27–31)
MCHC RBC AUTO-ENTMCNC: 34.3 G/DL (ref 32–36)
MCV RBC AUTO: 90 FL (ref 82–98)
MONOCYTES # BLD AUTO: 0.6 K/UL (ref 0.3–1)
MONOCYTES NFR BLD: 13.1 % (ref 4–15)
NEUTROPHILS # BLD AUTO: 3.4 K/UL (ref 1.8–7.7)
NEUTROPHILS NFR BLD: 70.7 % (ref 38–73)
NRBC BLD-RTO: 0 /100 WBC
PLATELET # BLD AUTO: 204 K/UL (ref 150–450)
PMV BLD AUTO: 9.6 FL (ref 9.2–12.9)
POTASSIUM SERPL-SCNC: 4.8 MMOL/L (ref 3.5–5.1)
PROT SERPL-MCNC: 6.4 G/DL (ref 6–8.4)
RBC # BLD AUTO: 4.03 M/UL (ref 4.6–6.2)
SODIUM SERPL-SCNC: 142 MMOL/L (ref 136–145)
WBC # BLD AUTO: 4.8 K/UL (ref 3.9–12.7)

## 2024-06-19 PROCEDURE — 1101F PT FALLS ASSESS-DOCD LE1/YR: CPT | Mod: CPTII,S$GLB,, | Performed by: STUDENT IN AN ORGANIZED HEALTH CARE EDUCATION/TRAINING PROGRAM

## 2024-06-19 PROCEDURE — 96402 CHEMO HORMON ANTINEOPL SQ/IM: CPT | Mod: PN

## 2024-06-19 PROCEDURE — 84153 ASSAY OF PSA TOTAL: CPT | Performed by: STUDENT IN AN ORGANIZED HEALTH CARE EDUCATION/TRAINING PROGRAM

## 2024-06-19 PROCEDURE — 3008F BODY MASS INDEX DOCD: CPT | Mod: CPTII,S$GLB,, | Performed by: STUDENT IN AN ORGANIZED HEALTH CARE EDUCATION/TRAINING PROGRAM

## 2024-06-19 PROCEDURE — 3077F SYST BP >= 140 MM HG: CPT | Mod: CPTII,S$GLB,, | Performed by: STUDENT IN AN ORGANIZED HEALTH CARE EDUCATION/TRAINING PROGRAM

## 2024-06-19 PROCEDURE — 3078F DIAST BP <80 MM HG: CPT | Mod: CPTII,S$GLB,, | Performed by: STUDENT IN AN ORGANIZED HEALTH CARE EDUCATION/TRAINING PROGRAM

## 2024-06-19 PROCEDURE — 99215 OFFICE O/P EST HI 40 MIN: CPT | Mod: S$GLB,,, | Performed by: STUDENT IN AN ORGANIZED HEALTH CARE EDUCATION/TRAINING PROGRAM

## 2024-06-19 PROCEDURE — 80053 COMPREHEN METABOLIC PANEL: CPT | Mod: PN | Performed by: STUDENT IN AN ORGANIZED HEALTH CARE EDUCATION/TRAINING PROGRAM

## 2024-06-19 PROCEDURE — 3288F FALL RISK ASSESSMENT DOCD: CPT | Mod: CPTII,S$GLB,, | Performed by: STUDENT IN AN ORGANIZED HEALTH CARE EDUCATION/TRAINING PROGRAM

## 2024-06-19 PROCEDURE — 63600175 PHARM REV CODE 636 W HCPCS: Mod: JZ,JG,PN | Performed by: STUDENT IN AN ORGANIZED HEALTH CARE EDUCATION/TRAINING PROGRAM

## 2024-06-19 PROCEDURE — 36415 COLL VENOUS BLD VENIPUNCTURE: CPT | Mod: PN | Performed by: STUDENT IN AN ORGANIZED HEALTH CARE EDUCATION/TRAINING PROGRAM

## 2024-06-19 PROCEDURE — 85025 COMPLETE CBC W/AUTO DIFF WBC: CPT | Mod: PN | Performed by: STUDENT IN AN ORGANIZED HEALTH CARE EDUCATION/TRAINING PROGRAM

## 2024-06-19 PROCEDURE — 1126F AMNT PAIN NOTED NONE PRSNT: CPT | Mod: CPTII,S$GLB,, | Performed by: STUDENT IN AN ORGANIZED HEALTH CARE EDUCATION/TRAINING PROGRAM

## 2024-06-19 PROCEDURE — G2211 COMPLEX E/M VISIT ADD ON: HCPCS | Mod: S$GLB,,, | Performed by: STUDENT IN AN ORGANIZED HEALTH CARE EDUCATION/TRAINING PROGRAM

## 2024-06-19 PROCEDURE — 99999 PR PBB SHADOW E&M-EST. PATIENT-LVL III: CPT | Mod: PBBFAC,,, | Performed by: STUDENT IN AN ORGANIZED HEALTH CARE EDUCATION/TRAINING PROGRAM

## 2024-06-19 RX ADMIN — LEUPROLIDE ACETATE 22.5 MG: KIT at 10:06

## 2024-06-24 ENCOUNTER — PATIENT MESSAGE (OUTPATIENT)
Dept: HEMATOLOGY/ONCOLOGY | Facility: CLINIC | Age: 69
End: 2024-06-24
Payer: COMMERCIAL

## 2024-06-24 DIAGNOSIS — C61 PROSTATE CANCER: ICD-10-CM

## 2024-06-24 RX ORDER — PREDNISONE 5 MG/1
5 TABLET ORAL DAILY
Qty: 30 TABLET | Refills: 3 | Status: SHIPPED | OUTPATIENT
Start: 2024-06-24

## 2024-07-18 DIAGNOSIS — C61 PROSTATE CANCER: ICD-10-CM

## 2024-07-19 DIAGNOSIS — C61 PROSTATE CANCER: ICD-10-CM

## 2024-07-19 RX ORDER — PREDNISONE 5 MG/1
5 TABLET ORAL DAILY
Qty: 30 TABLET | Refills: 3 | Status: SHIPPED | OUTPATIENT
Start: 2024-07-19 | End: 2024-07-22

## 2024-07-22 RX ORDER — PREDNISONE 5 MG/1
TABLET ORAL
Qty: 30 TABLET | Refills: 3 | Status: SHIPPED | OUTPATIENT
Start: 2024-07-22

## 2024-07-31 ENCOUNTER — LAB VISIT (OUTPATIENT)
Dept: LAB | Facility: HOSPITAL | Age: 69
End: 2024-07-31
Attending: FAMILY MEDICINE
Payer: COMMERCIAL

## 2024-07-31 DIAGNOSIS — C61 PROSTATE CANCER: ICD-10-CM

## 2024-07-31 LAB
ALBUMIN SERPL BCP-MCNC: 3.4 G/DL (ref 3.5–5.2)
ALP SERPL-CCNC: 75 U/L (ref 55–135)
ALT SERPL W/O P-5'-P-CCNC: 18 U/L (ref 10–44)
ANION GAP SERPL CALC-SCNC: 6 MMOL/L (ref 8–16)
AST SERPL-CCNC: 17 U/L (ref 10–40)
BASOPHILS # BLD AUTO: 0.06 K/UL (ref 0–0.2)
BASOPHILS NFR BLD: 1.1 % (ref 0–1.9)
BILIRUB SERPL-MCNC: 0.4 MG/DL (ref 0.1–1)
BUN SERPL-MCNC: 18 MG/DL (ref 8–23)
CALCIUM SERPL-MCNC: 9.4 MG/DL (ref 8.7–10.5)
CHLORIDE SERPL-SCNC: 107 MMOL/L (ref 95–110)
CO2 SERPL-SCNC: 29 MMOL/L (ref 23–29)
CREAT SERPL-MCNC: 0.8 MG/DL (ref 0.5–1.4)
DIFFERENTIAL METHOD BLD: ABNORMAL
EOSINOPHIL # BLD AUTO: 0.2 K/UL (ref 0–0.5)
EOSINOPHIL NFR BLD: 3 % (ref 0–8)
ERYTHROCYTE [DISTWIDTH] IN BLOOD BY AUTOMATED COUNT: 12.7 % (ref 11.5–14.5)
EST. GFR  (NO RACE VARIABLE): >60 ML/MIN/1.73 M^2
GLUCOSE SERPL-MCNC: 102 MG/DL (ref 70–110)
HCT VFR BLD AUTO: 36.8 % (ref 40–54)
HGB BLD-MCNC: 12.6 G/DL (ref 14–18)
IMM GRANULOCYTES # BLD AUTO: 0.07 K/UL (ref 0–0.04)
IMM GRANULOCYTES NFR BLD AUTO: 1.3 % (ref 0–0.5)
LYMPHOCYTES # BLD AUTO: 0.6 K/UL (ref 1–4.8)
LYMPHOCYTES NFR BLD: 10.5 % (ref 18–48)
MCH RBC QN AUTO: 30.3 PG (ref 27–31)
MCHC RBC AUTO-ENTMCNC: 34.2 G/DL (ref 32–36)
MCV RBC AUTO: 89 FL (ref 82–98)
MONOCYTES # BLD AUTO: 0.8 K/UL (ref 0.3–1)
MONOCYTES NFR BLD: 15.8 % (ref 4–15)
NEUTROPHILS # BLD AUTO: 3.6 K/UL (ref 1.8–7.7)
NEUTROPHILS NFR BLD: 68.3 % (ref 38–73)
NRBC BLD-RTO: 0 /100 WBC
PLATELET # BLD AUTO: 201 K/UL (ref 150–450)
PMV BLD AUTO: 9 FL (ref 9.2–12.9)
POTASSIUM SERPL-SCNC: 4.7 MMOL/L (ref 3.5–5.1)
PROT SERPL-MCNC: 6.5 G/DL (ref 6–8.4)
RBC # BLD AUTO: 4.16 M/UL (ref 4.6–6.2)
SODIUM SERPL-SCNC: 142 MMOL/L (ref 136–145)
WBC # BLD AUTO: 5.25 K/UL (ref 3.9–12.7)

## 2024-07-31 PROCEDURE — 80053 COMPREHEN METABOLIC PANEL: CPT | Mod: PN | Performed by: STUDENT IN AN ORGANIZED HEALTH CARE EDUCATION/TRAINING PROGRAM

## 2024-07-31 PROCEDURE — 36415 COLL VENOUS BLD VENIPUNCTURE: CPT | Mod: PN | Performed by: STUDENT IN AN ORGANIZED HEALTH CARE EDUCATION/TRAINING PROGRAM

## 2024-07-31 PROCEDURE — 85025 COMPLETE CBC W/AUTO DIFF WBC: CPT | Mod: PN | Performed by: STUDENT IN AN ORGANIZED HEALTH CARE EDUCATION/TRAINING PROGRAM

## 2024-09-03 NOTE — PRE-PROCEDURE INSTRUCTIONS
PreOp Instructions given:  - Written medication information (what to hold and what to take)  - NPO guidelines  - Arrival place directions given  - Time to be given the day before procedure by the  Surgeon's Office  - Bathing with antibacterial soap/Hibiclens   - Don't wear any jewelry or bring any valuables AM of surgery  - No makeup or moisturizer to face  - No perfume/cologne, powder, lotions or aftershave    Patient was instructed to call and update the PreOp nurse about any changes to health, changes to medication, new office visits or hospitalizations between now and surgery.    Pt. verbalized understanding.   
33.7

## 2024-09-11 ENCOUNTER — PATIENT MESSAGE (OUTPATIENT)
Dept: FAMILY MEDICINE | Facility: CLINIC | Age: 69
End: 2024-09-11
Payer: COMMERCIAL

## 2024-09-18 ENCOUNTER — INFUSION (OUTPATIENT)
Dept: INFUSION THERAPY | Facility: HOSPITAL | Age: 69
End: 2024-09-18
Attending: STUDENT IN AN ORGANIZED HEALTH CARE EDUCATION/TRAINING PROGRAM
Payer: COMMERCIAL

## 2024-09-18 ENCOUNTER — LAB VISIT (OUTPATIENT)
Dept: LAB | Facility: HOSPITAL | Age: 69
End: 2024-09-18
Attending: STUDENT IN AN ORGANIZED HEALTH CARE EDUCATION/TRAINING PROGRAM
Payer: COMMERCIAL

## 2024-09-18 ENCOUNTER — OFFICE VISIT (OUTPATIENT)
Dept: HEMATOLOGY/ONCOLOGY | Facility: CLINIC | Age: 69
End: 2024-09-18
Payer: COMMERCIAL

## 2024-09-18 VITALS
HEIGHT: 74 IN | WEIGHT: 223.75 LBS | BODY MASS INDEX: 28.71 KG/M2 | RESPIRATION RATE: 17 BRPM | TEMPERATURE: 97 F | OXYGEN SATURATION: 96 % | HEART RATE: 60 BPM | DIASTOLIC BLOOD PRESSURE: 75 MMHG | SYSTOLIC BLOOD PRESSURE: 135 MMHG

## 2024-09-18 VITALS
RESPIRATION RATE: 17 BRPM | WEIGHT: 223.75 LBS | DIASTOLIC BLOOD PRESSURE: 75 MMHG | TEMPERATURE: 98 F | HEIGHT: 74 IN | SYSTOLIC BLOOD PRESSURE: 135 MMHG | HEART RATE: 60 BPM | BODY MASS INDEX: 28.71 KG/M2 | OXYGEN SATURATION: 96 %

## 2024-09-18 DIAGNOSIS — C61 PROSTATE CANCER: Primary | ICD-10-CM

## 2024-09-18 DIAGNOSIS — K76.89 HEPATIC CYST: ICD-10-CM

## 2024-09-18 DIAGNOSIS — D72.828 GRANULOCYTOSIS: ICD-10-CM

## 2024-09-18 DIAGNOSIS — R91.1 PULMONARY NODULE: ICD-10-CM

## 2024-09-18 DIAGNOSIS — M85.852 OSTEOPENIA OF LEFT HIP: ICD-10-CM

## 2024-09-18 DIAGNOSIS — Z79.818 ANDROGEN DEPRIVATION THERAPY: ICD-10-CM

## 2024-09-18 DIAGNOSIS — C61 PROSTATE CANCER: ICD-10-CM

## 2024-09-18 PROBLEM — D53.9 NUTRITIONAL ANEMIA: Status: ACTIVE | Noted: 2019-12-13

## 2024-09-18 LAB
ALBUMIN SERPL BCP-MCNC: 3.4 G/DL (ref 3.5–5.2)
ALP SERPL-CCNC: 79 U/L (ref 55–135)
ALT SERPL W/O P-5'-P-CCNC: 11 U/L (ref 10–44)
ANION GAP SERPL CALC-SCNC: 10 MMOL/L (ref 8–16)
AST SERPL-CCNC: 15 U/L (ref 10–40)
BASOPHILS # BLD AUTO: 0.06 K/UL (ref 0–0.2)
BASOPHILS NFR BLD: 1.1 % (ref 0–1.9)
BILIRUB SERPL-MCNC: 0.3 MG/DL (ref 0.1–1)
BUN SERPL-MCNC: 19 MG/DL (ref 8–23)
CALCIUM SERPL-MCNC: 9.3 MG/DL (ref 8.7–10.5)
CHLORIDE SERPL-SCNC: 107 MMOL/L (ref 95–110)
CO2 SERPL-SCNC: 25 MMOL/L (ref 23–29)
COMPLEXED PSA SERPL-MCNC: <0.01 NG/ML (ref 0–4)
CREAT SERPL-MCNC: 0.8 MG/DL (ref 0.5–1.4)
DIFFERENTIAL METHOD BLD: ABNORMAL
EOSINOPHIL # BLD AUTO: 0.1 K/UL (ref 0–0.5)
EOSINOPHIL NFR BLD: 2.6 % (ref 0–8)
ERYTHROCYTE [DISTWIDTH] IN BLOOD BY AUTOMATED COUNT: 12.7 % (ref 11.5–14.5)
EST. GFR  (NO RACE VARIABLE): >60 ML/MIN/1.73 M^2
GLUCOSE SERPL-MCNC: 96 MG/DL (ref 70–110)
HCT VFR BLD AUTO: 37.2 % (ref 40–54)
HGB BLD-MCNC: 12.7 G/DL (ref 14–18)
IMM GRANULOCYTES # BLD AUTO: 0.08 K/UL (ref 0–0.04)
IMM GRANULOCYTES NFR BLD AUTO: 1.5 % (ref 0–0.5)
LYMPHOCYTES # BLD AUTO: 0.5 K/UL (ref 1–4.8)
LYMPHOCYTES NFR BLD: 9.3 % (ref 18–48)
MCH RBC QN AUTO: 29.6 PG (ref 27–31)
MCHC RBC AUTO-ENTMCNC: 34.1 G/DL (ref 32–36)
MCV RBC AUTO: 87 FL (ref 82–98)
MONOCYTES # BLD AUTO: 0.7 K/UL (ref 0.3–1)
MONOCYTES NFR BLD: 13.2 % (ref 4–15)
NEUTROPHILS # BLD AUTO: 3.9 K/UL (ref 1.8–7.7)
NEUTROPHILS NFR BLD: 72.3 % (ref 38–73)
NRBC BLD-RTO: 0 /100 WBC
PLATELET # BLD AUTO: 232 K/UL (ref 150–450)
PMV BLD AUTO: 8.9 FL (ref 9.2–12.9)
POTASSIUM SERPL-SCNC: 4.3 MMOL/L (ref 3.5–5.1)
PROT SERPL-MCNC: 6.6 G/DL (ref 6–8.4)
RBC # BLD AUTO: 4.29 M/UL (ref 4.6–6.2)
SODIUM SERPL-SCNC: 142 MMOL/L (ref 136–145)
WBC # BLD AUTO: 5.38 K/UL (ref 3.9–12.7)

## 2024-09-18 PROCEDURE — 1101F PT FALLS ASSESS-DOCD LE1/YR: CPT | Mod: CPTII,S$GLB,, | Performed by: STUDENT IN AN ORGANIZED HEALTH CARE EDUCATION/TRAINING PROGRAM

## 2024-09-18 PROCEDURE — 3008F BODY MASS INDEX DOCD: CPT | Mod: CPTII,S$GLB,, | Performed by: STUDENT IN AN ORGANIZED HEALTH CARE EDUCATION/TRAINING PROGRAM

## 2024-09-18 PROCEDURE — G2211 COMPLEX E/M VISIT ADD ON: HCPCS | Mod: S$GLB,,, | Performed by: STUDENT IN AN ORGANIZED HEALTH CARE EDUCATION/TRAINING PROGRAM

## 2024-09-18 PROCEDURE — 3075F SYST BP GE 130 - 139MM HG: CPT | Mod: CPTII,S$GLB,, | Performed by: STUDENT IN AN ORGANIZED HEALTH CARE EDUCATION/TRAINING PROGRAM

## 2024-09-18 PROCEDURE — 1160F RVW MEDS BY RX/DR IN RCRD: CPT | Mod: CPTII,S$GLB,, | Performed by: STUDENT IN AN ORGANIZED HEALTH CARE EDUCATION/TRAINING PROGRAM

## 2024-09-18 PROCEDURE — 1126F AMNT PAIN NOTED NONE PRSNT: CPT | Mod: CPTII,S$GLB,, | Performed by: STUDENT IN AN ORGANIZED HEALTH CARE EDUCATION/TRAINING PROGRAM

## 2024-09-18 PROCEDURE — 99214 OFFICE O/P EST MOD 30 MIN: CPT | Mod: S$GLB,,, | Performed by: STUDENT IN AN ORGANIZED HEALTH CARE EDUCATION/TRAINING PROGRAM

## 2024-09-18 PROCEDURE — 84153 ASSAY OF PSA TOTAL: CPT | Performed by: STUDENT IN AN ORGANIZED HEALTH CARE EDUCATION/TRAINING PROGRAM

## 2024-09-18 PROCEDURE — 85025 COMPLETE CBC W/AUTO DIFF WBC: CPT | Mod: PN | Performed by: STUDENT IN AN ORGANIZED HEALTH CARE EDUCATION/TRAINING PROGRAM

## 2024-09-18 PROCEDURE — 63600175 PHARM REV CODE 636 W HCPCS: Mod: JZ,JG,PN | Performed by: STUDENT IN AN ORGANIZED HEALTH CARE EDUCATION/TRAINING PROGRAM

## 2024-09-18 PROCEDURE — 3078F DIAST BP <80 MM HG: CPT | Mod: CPTII,S$GLB,, | Performed by: STUDENT IN AN ORGANIZED HEALTH CARE EDUCATION/TRAINING PROGRAM

## 2024-09-18 PROCEDURE — 96402 CHEMO HORMON ANTINEOPL SQ/IM: CPT | Mod: PN

## 2024-09-18 PROCEDURE — 80053 COMPREHEN METABOLIC PANEL: CPT | Mod: PN | Performed by: STUDENT IN AN ORGANIZED HEALTH CARE EDUCATION/TRAINING PROGRAM

## 2024-09-18 PROCEDURE — 3288F FALL RISK ASSESSMENT DOCD: CPT | Mod: CPTII,S$GLB,, | Performed by: STUDENT IN AN ORGANIZED HEALTH CARE EDUCATION/TRAINING PROGRAM

## 2024-09-18 PROCEDURE — 36415 COLL VENOUS BLD VENIPUNCTURE: CPT | Mod: PN | Performed by: STUDENT IN AN ORGANIZED HEALTH CARE EDUCATION/TRAINING PROGRAM

## 2024-09-18 PROCEDURE — 1159F MED LIST DOCD IN RCRD: CPT | Mod: CPTII,S$GLB,, | Performed by: STUDENT IN AN ORGANIZED HEALTH CARE EDUCATION/TRAINING PROGRAM

## 2024-09-18 PROCEDURE — 99999 PR PBB SHADOW E&M-EST. PATIENT-LVL III: CPT | Mod: PBBFAC,,, | Performed by: STUDENT IN AN ORGANIZED HEALTH CARE EDUCATION/TRAINING PROGRAM

## 2024-09-18 RX ORDER — ABIRATERONE ACETATE 250 MG/1
250 TABLET ORAL DAILY
Qty: 30 TABLET | Refills: 11 | Status: ACTIVE | OUTPATIENT
Start: 2024-09-18 | End: 2025-09-18

## 2024-09-18 RX ORDER — PREDNISONE 5 MG/1
5 TABLET ORAL DAILY
Qty: 30 TABLET | Refills: 3 | Status: SHIPPED | OUTPATIENT
Start: 2024-09-18

## 2024-09-18 RX ORDER — ABIRATERONE ACETATE 250 MG/1
250 TABLET ORAL DAILY
Qty: 30 TABLET | Refills: 11 | Status: SHIPPED | OUTPATIENT
Start: 2024-09-18 | End: 2024-09-18

## 2024-09-18 RX ADMIN — LEUPROLIDE ACETATE 22.5 MG: KIT at 11:09

## 2024-09-18 NOTE — PROGRESS NOTES
[facility]  FOLLOW-UP VISIT.     Reason for visit: Establish Care     Best Contact Phone Number(s): [phone] (home)      Cancer/Stage/TNM:    Cancer Staging   Very High Risk Prostate Cancer  Staging form: Prostate, AJCC 8th Edition  - Clinical stage from 11/16/2023: Stage IIIB (cT3b, cN0, cM0, PSA: 15.5, Grade Group: 3) - Signed by Cholo Mortensen MD on 11/16/2023       Oncology History   Very High Risk Prostate Cancer   11/16/2023 Initial Diagnosis    Prostate cancer: Patient's PSA previously normal until 15.48 (7/2023) -> 22.2 (9/2023).     10/3/23: MRI Prostate shows PIRADS 5 lesion 32 x 10 mm in medial and lateral aspects of peripheral zones with minimal invasion of the right seminal vesicles, no perineural or extracapsular extension visualized. 8 mm nodules visualized on left iliac bone  11/8/23: Prostate biopsy shows Viktoria 4+3=7, negative for perineural invasion, positive for LVI  11/14/23: PSMA PET shows activity confined to the prostate gland and seminal vesicles consistent with known malignancy, no lymph node metastases nor distant metastatic disease. Spiculated appearing lung nodule concerning for incidental bronchogenic carcinoma. No irregularity corresponding to iliac lesion on MRI.      11/16/2023 Cancer Staged    Staging form: Prostate, AJCC 8th Edition  - Clinical stage from 11/16/2023: Stage IIIB (cT3b, cN0, cM0, PSA: 15.5, Grade Group: 3)     2/6/2024 - 3/15/2024 Radiation Therapy    Treating physician: Radha Mortensen and Marlo  Total Dose: 55 Gy  Fractions: 28     2/6/2024 - 3/15/2024 Radiation Therapy    Treatment Site Ref. ID Energy Dose/Fx (Gy) #Fx Dose Correction (Gy) Total Dose (Gy) Start Date End Date Elapsed Days   IM Prostate Prostate 6X 2.5 28 / 28 0 70 2/6/2024 3/15/2024 38       Treating physician: Bal[place]  Total Dose: 70 Gy  Fractions: 28        Interval History: Frank Gay Jr. is a 69 y.o. male with localized prostate cancer who presents for follow  up. Since last visit has had continuous hot flashes. Notes slightly decreased energy as well. Remains active. No palpitations or other systemic complaints.     Patient presents to clinic with wife, Laisha.  ECOG Performance Status is 0.    ROS:  A complete 12-point review of systems was reviewed and is negative except as mentioned above.     Past Medical History:   Past Medical History:   Diagnosis Date    Arthritis     Cancer     PROSTATE    Elevated PSA     Genetic testing 12/21/2023    [employer] Multi Cancer Panel (70 genes) - NEGATIVE    Gout, unspecified     Personal history of venous thrombosis and embolism     After trauma in the 70's, he had a DVT with a PE    Pulmonary embolism     Sleep apnea     RESOLVED WITH UPPP AND SEPTOPLASTY/ NO MACHINE        Allergies:   Review of patient's allergies indicates:   Allergen Reactions    Celebrex  [celecoxib] Blisters, Dermatitis, Edema, Hallucinations, Hives, Itching and Swelling    Dairy aid  [lactase]      Other reaction(s): stomach cramps  Other reaction(s): Itching  Other reaction(s): Diarrhea    Eliquis  [apixaban] Blisters, Edema, Hallucinations, Other (See Comments) and Swelling    Influenza a (h1n1) vac 09 (pf)     Lactose         Medications:   Current Outpatient Medications   Medication Sig Dispense Refill    ammonium lactate (LAC-HYDRIN) 12 % lotion Apply topically 2 (two) times daily.      colchicine (COLCRYS) 0.6 mg tablet Take 0.6 mg by mouth once daily.      LIDOcaine-TETRAcaine 7-7 % Crea APPLY 2 GRAMS TO THE AFFECTED AREAS 3-4 TIMES DAILY, 30 g 0    abiraterone (ZYTIGA) 250 mg Tab Take 1 tablet (250 mg total) by mouth once daily. 30 tablet 11    predniSONE (DELTASONE) 5 MG tablet Take 1 tablet (5 mg total) by mouth once daily. 30 tablet 3     No current facility-administered medications for this visit.        Physical Exam:   /75 (BP Location: Right arm, Patient Position: Sitting, BP Method: Medium (Automatic))   Pulse 60   Temp 96.5 °F (35.8  "°C) (Temporal)   Resp 17   Ht 6' 2" (1.88 m)   Wt 101.5 kg (223 lb 12.3 oz)   SpO2 96%   BMI 28.73 kg/m²      Physical Exam  Constitutional:       Appearance: Normal appearance.   HENT:      Head: Normocephalic and atraumatic.      Mouth/Throat:      Mouth: Mucous membranes are moist.   Eyes:      Extraocular Movements: Extraocular movements intact.      Pupils: Pupils are equal, round, and reactive to light.   Cardiovascular:      Rate and Rhythm: Normal rate and regular rhythm.      Pulses: Normal pulses.      Heart sounds: No murmur heard.  Pulmonary:      Effort: Pulmonary effort is normal. No respiratory distress.      Breath sounds: Normal breath sounds.   Abdominal:      General: Abdomen is flat. There is no distension.      Palpations: Abdomen is soft.      Tenderness: There is no abdominal tenderness.   Musculoskeletal:         General: No swelling or tenderness. Normal range of motion.      Cervical back: Normal range of motion. No rigidity.   Skin:     General: Skin is warm and dry.      Coloration: Skin is not jaundiced.   Neurological:      General: No focal deficit present.      Mental Status: He is alert and oriented to person, place, and time.   Psychiatric:         Mood and Affect: Mood normal.         Behavior: Behavior normal.           Labs:   Lab Results   Component Value Date    WBC 5.38 09/18/2024    HGB 12.7 (L) 09/18/2024    HCT 37.2 (L) 09/18/2024    MCV 87 09/18/2024     09/18/2024       Lab Results   Component Value Date     09/18/2024    K 4.3 09/18/2024     09/18/2024    CO2 25 09/18/2024    BUN 19 09/18/2024    CREATININE 0.8 09/18/2024    ALBUMIN 3.4 (L) 09/18/2024    BILITOT 0.3 09/18/2024    ALKPHOS 79 09/18/2024    AST 15 09/18/2024    ALT 11 09/18/2024       Imaging:   CT CHEST WITHOUT CONTRAST  Narrative: EXAMINATION:  CT CHEST WITHOUT CONTRAST    CLINICAL HISTORY:  Lung nodule, > 8mm;ION Robotic Protocol; Malignant neoplasm of prostate    TECHNIQUE:  Low " dose axial images, sagittal and coronal reformations were obtained from the thoracic inlet to the lung bases. Contrast was not administered.    COMPARISON:  11/22/2023    FINDINGS:  There is a solid spiculated nodule within the superior segment of the left lower lobe measuring 1.1 cm, without significant change (series 4, image 159).    A solid 7 mm nodule in the inferior right upper lobe along the minor fissure is also unchanged (series, image 257).    The heart size is normal without pericardial effusion.  There is no hilar or mediastinal lymphadenopathy.    Degenerative change of the spine typical for age is present.  Impression: Unchanged pulmonary nodules, including a spiculated 1.1 cm superior segment right lower lobe nodule for which neoplasm is not excluded.  Additional follow-up in 6-12 months is recommended.    Electronically signed by: Adriano Lopez MD  Date:    03/08/2024  Time:    11:24  DXA Bone Density Axial Skeleton 1 or more sites  Narrative: EXAMINATION:  DXA BONE DENSITY AXIAL SKELETON 1 OR MORE SITES    CLINICAL HISTORY:  Long term (current) use of other agents affecting estrogen receptors and estrogen levels    TECHNIQUE:  DXA scanning was performed over the left hip and lumbar spine.  Review of the images confirms satisfactory positioning and technique.    COMPARISON:  None    FINDINGS:  The L1 to L4 vertebral bone mineral density is equal to 1.165 g/cm squared with a T score of 0.7.    The left femoral neck bone mineral density is equal to 0.691 g/cm squared with a T score of -1.8.    There is a 18% risk of a major osteoporotic fracture and a 3.6% risk of hip fracture in the next 10 years (FRAX).  Impression: Osteopenia of the left hip, normal bone mineral density of the lumbar spine    Consider FDA approved medical therapies in postmenopausal women and men aged 50 years and older, based on the following:    *A hip or vertebral (clinical or morphometric) fracture  *T score less than or  equal to -2.5 at the femoral neck or spine after appropriate evaluation to exclude secondary causes.  *Low bone mass -- also known as osteopenia (T score between -1.0 and -2.5 at the femoral neck or spine) and a 10 year probability of hip fracture greater than or equal to 3% or a 10 year probability of major osteoporosis-related fracture greater than or equal to 20% based on the [place]-adapted WHO algorithm.  *Clinicians judgment and/or patient preference may indicate treatment for people with 10 year fracture probabilities is above or below these levels.    Electronically signed by: Gilles Belcher MD  Date:    03/08/2024  Time:    09:24            Diagnoses:       1. Very High Risk Prostate Cancer    2. Osteopenia of left hip    3. Androgen deprivation therapy    4. Pulmonary nodule    5. Hepatic cyst    6. Granulocytosis            Assessment and Plan:     #  Very High Risk Prostate Cancer, Stage IIIB ([id number]) - Based off of cT3b and PSA >20 patient has very high risk disease. PSMA PET shows bronchogenic lung lesions and CT Chest shows several pleural based lesions. After pulmonary evaluation, opt to follow with surveillance and considered less likely malignancy without other sites of metastases and appearance is inconsistent with primary lung malignancy. As such, plan to treat as localized prostate cancer with ADT + XRT, and patient meets high risk criteria per [place] for the addition of Abiraterone. 12/2023 - 12/2025. Genetic counseling negative.      Patient received radiation therapy 2/7/24 - 3/15 x28 fractions. Tolerating Zytiga well. Lupron today, next due 12/2024. DEXA scan shows osteopenia of left hip, encourage 4 servings of calcium daily + vit D.  - repeat cmp in 6w and 12w on Abiraterone  - return to clinic in 3m with next dose of Lupron  - would prefer [facility] for 2025    # Osteopenia - Left hip osteopenia, notes occasional pain but not consistent     # ADT - Hot flashes noted, patient denies need  for pharmaceutical therapy or acupuncture.     # Lung Nodule - No smoking history, no cough, does have high risk work exposure. Established care with Dr. Mas and will continue with active surveillance. Repeat CT CAP with stable pulmonary findings   - fu with Dr. Mas as scheduled 9/2024    # Hepatic lesions - <1 cm cysts seen on PSMA and CT Chest. Restaging A/P not complete, will follow on next imaging study    # Nutritional anemia - Send eval at next visit    # Granulocytosis - 2/2 prednisone    he will return to clinic in , but knows to call in the interim if symptoms change or should a problem arise.    Monica Carnes MD  Hematology/Oncology  [facility]      Med Onc Chart Routing      Follow up with physician 3 months. lavell   Follow up with CAROLINE    Infusion scheduling note    Injection scheduling note lupron inj 3m   Labs   Scheduling:  Preferred lab:  Lab interval:  Labs in : all labs ordered today prior to visit   Imaging    Pharmacy appointment    Other referrals

## 2024-09-18 NOTE — PLAN OF CARE
Problem: Adult Inpatient Plan of Care  Goal: Plan of Care Review  Outcome: Progressing  Flowsheets (Taken 9/18/2024 1140)  Plan of Care Reviewed With: patient  Goal: Patient-Specific Goal (Individualized)  Outcome: Progressing  Flowsheets (Taken 9/18/2024 1140)  Individualized Care Needs: recliner  Anxieties, Fears or Concerns: none  Patient/Family-Specific Goals (Include Timeframe): no s/s of reaction with inj     Problem: Fatigue  Goal: Improved Activity Tolerance  Outcome: Progressing  Intervention: Promote Improved Energy  Flowsheets (Taken 9/18/2024 1140)  Activity Management:   Ambulated in [place] - L4   Up in chair - L3  Environmental Support: calm environment promoted   Patient tolerated Lupron injection well, d/c in no acute distress.

## 2024-09-19 DIAGNOSIS — C61 PROSTATE CANCER: Primary | ICD-10-CM

## 2024-09-19 DIAGNOSIS — Z79.818 ANDROGEN DEPRIVATION THERAPY: ICD-10-CM

## 2024-09-24 ENCOUNTER — HOSPITAL ENCOUNTER (OUTPATIENT)
Dept: RADIOLOGY | Facility: HOSPITAL | Age: 69
Discharge: HOME | End: 2024-09-24
Attending: INTERNAL MEDICINE
Payer: COMMERCIAL

## 2024-09-24 ENCOUNTER — OFFICE VISIT (OUTPATIENT)
Dept: PULMONOLOGY | Facility: CLINIC | Age: 69
End: 2024-09-24
Attending: INTERNAL MEDICINE
Payer: COMMERCIAL

## 2024-09-24 VITALS
OXYGEN SATURATION: 98 % | RESPIRATION RATE: 18 BRPM | HEART RATE: 62 BPM | DIASTOLIC BLOOD PRESSURE: 80 MMHG | SYSTOLIC BLOOD PRESSURE: 152 MMHG | HEIGHT: 74 IN | BODY MASS INDEX: 28.6 KG/M2 | WEIGHT: 222.88 LBS

## 2024-09-24 DIAGNOSIS — D38.1 NEOPLASM OF UNCERTAIN BEHAVIOR OF RIGHT UPPER LOBE OF LUNG: ICD-10-CM

## 2024-09-24 DIAGNOSIS — R91.1 LUNG NODULE SEEN ON IMAGING STUDY: Primary | ICD-10-CM

## 2024-09-24 PROCEDURE — 3079F DIAST BP 80-89 MM HG: CPT | Mod: CPTII,S$GLB,, | Performed by: INTERNAL MEDICINE

## 2024-09-24 PROCEDURE — 1159F MED LIST DOCD IN RCRD: CPT | Mod: CPTII,S$GLB,, | Performed by: INTERNAL MEDICINE

## 2024-09-24 PROCEDURE — 3008F BODY MASS INDEX DOCD: CPT | Mod: CPTII,S$GLB,, | Performed by: INTERNAL MEDICINE

## 2024-09-24 PROCEDURE — 1160F RVW MEDS BY RX/DR IN RCRD: CPT | Mod: CPTII,S$GLB,, | Performed by: INTERNAL MEDICINE

## 2024-09-24 PROCEDURE — 3288F FALL RISK ASSESSMENT DOCD: CPT | Mod: CPTII,S$GLB,, | Performed by: INTERNAL MEDICINE

## 2024-09-24 PROCEDURE — 99999 PR PBB SHADOW E&M-EST. PATIENT-LVL III: CPT | Mod: PBBFAC,,, | Performed by: INTERNAL MEDICINE

## 2024-09-24 PROCEDURE — 99214 OFFICE O/P EST MOD 30 MIN: CPT | Mod: S$GLB,,, | Performed by: INTERNAL MEDICINE

## 2024-09-24 PROCEDURE — 3077F SYST BP >= 140 MM HG: CPT | Mod: CPTII,S$GLB,, | Performed by: INTERNAL MEDICINE

## 2024-09-24 PROCEDURE — 71250 CT THORAX DX C-: CPT | Mod: 26,,, | Performed by: RADIOLOGY

## 2024-09-24 PROCEDURE — 1101F PT FALLS ASSESS-DOCD LE1/YR: CPT | Mod: CPTII,S$GLB,, | Performed by: INTERNAL MEDICINE

## 2024-09-24 PROCEDURE — 71250 CT THORAX DX C-: CPT | Mod: TC

## 2024-09-24 NOTE — PROGRESS NOTES
Subjective:      Patient ID: Frank Gay Jr. is a 69 y.o. male.    Chief Complaint: Pulmonary Nodules    Pulmonary Nodules        December 2023:  68-year-old male previously healthy who recently underwent a prostate MRI, subsequent biopsy and PET with newly diagnosed stage III carcinoma of the prostate.  He was evaluated by Urology and Heme-Onc as well as Rad Onc and is opting for medical rather than surgical treatment.  His PET imaging demonstrated some lung nodules which are further characterized by dedicated CT of the chest which showed scattered bilateral nodules with the largest being right lower lobe superior segment approximately 1 cm and then a lingular density of about 9 mm.  He is here today for that reason.  He is with his wife.  He is no pulmonary complaints or symptoms.  He is a never smoker but did work as a  for many years.  Imaging did not demonstrate any suspicious lesions between the prostate and the lung      March 2024  Here today for follow up of lung nodules as above  Continues treatment for prostate cancer  Tolerating well thus far no complications  CT of the chest done today for review    Sept 2024  Here for follow up of the above  PSA remains essentially 0  CT chest done today for review  No new pulmonary complaints    Review of Systems as per history of present illness otherwise negative  Objective:     Physical Exam   Constitutional: He is oriented to person, place, and time. He appears well-developed. No distress.   HENT:   Head: Normocephalic.   Cardiovascular: Normal rate and regular rhythm.   Pulmonary/Chest: Normal expansion, symmetric chest wall expansion, effort normal and breath sounds normal.   Musculoskeletal:      Cervical back: Neck supple.   Neurological: He is alert and oriented to person, place, and time.   Psychiatric: He has a normal mood and affect.   Nursing note and vitals reviewed.          9/24/2024    10:41 AM 9/18/2024    11:38 AM 9/18/2024    10:21 AM  "6/19/2024    10:21 AM 6/19/2024     9:51 AM 6/10/2024    10:19 AM 4/24/2024     9:26 AM   Pulmonary Function Tests   SpO2 98 % 96 % 96 % 98 % 98 %  98 %   Height 6' 2" (1.88 m) 6' 2" (1.88 m) 6' 2" (1.88 m)  6' 2" (1.88 m) 6' (1.829 m)    Weight 101.1 kg (222 lb 14.2 oz) 101.5 kg (223 lb 12.3 oz) 101.5 kg (223 lb 12.3 oz) 95 kg (209 lb 7 oz) 95.8 kg (211 lb 3.2 oz) 95 kg (209 lb 7 oz) 93.1 kg (205 lb 4 oz)   BMI (Calculated) 28.6 28.7 28.7 26.9 27.1 28.4         Assessment:     1. Lung nodule seen on imaging study         Orders Placed This Encounter   Procedures    CT Chest Without Contrast     Standing Status:   Future     Standing Expiration Date:   9/24/2026     Order Specific Question:   May the Radiologist modify the order per protocol to meet the clinical needs of the patient?     Answer:   Yes     I personally reviewed CT chest images obtained today and compared these to prior studies from November of 2023 in March of 2024.  My interpretation is:  Stable bilateral lung nodules.  No interval detrimental change      Plan:     Stable lung nodules on serial CT over proximally 1 year  PSA remains 0  Patient is a never smoker  Low index of suspicion for malignancy  We will repeat CT in 1 year, sooner if needed  Discussed the above with the patient in his wife who voiced understanding and agreement with this plan     "

## 2024-10-28 ENCOUNTER — PATIENT MESSAGE (OUTPATIENT)
Dept: ADMINISTRATIVE | Facility: OTHER | Age: 69
End: 2024-10-28
Payer: COMMERCIAL

## 2024-12-09 ENCOUNTER — LAB VISIT (OUTPATIENT)
Dept: LAB | Facility: HOSPITAL | Age: 69
End: 2024-12-09
Attending: STUDENT IN AN ORGANIZED HEALTH CARE EDUCATION/TRAINING PROGRAM
Payer: COMMERCIAL

## 2024-12-09 DIAGNOSIS — Z79.818 ANDROGEN DEPRIVATION THERAPY: ICD-10-CM

## 2024-12-09 DIAGNOSIS — C61 PROSTATE CANCER: ICD-10-CM

## 2024-12-09 DIAGNOSIS — C61 PROSTATE CA: Primary | ICD-10-CM

## 2024-12-09 DIAGNOSIS — C61 PROSTATE CA: ICD-10-CM

## 2024-12-09 LAB
ALBUMIN SERPL BCP-MCNC: 3.4 G/DL (ref 3.5–5.2)
ALP SERPL-CCNC: 89 U/L (ref 40–150)
ALT SERPL W/O P-5'-P-CCNC: 11 U/L (ref 10–44)
ANION GAP SERPL CALC-SCNC: 9 MMOL/L (ref 8–16)
AST SERPL-CCNC: 16 U/L (ref 10–40)
BASOPHILS # BLD AUTO: 0.05 K/UL (ref 0–0.2)
BASOPHILS NFR BLD: 1.1 % (ref 0–1.9)
BILIRUB SERPL-MCNC: 0.3 MG/DL (ref 0.1–1)
BUN SERPL-MCNC: 22 MG/DL (ref 8–23)
CALCIUM SERPL-MCNC: 9.2 MG/DL (ref 8.7–10.5)
CHLORIDE SERPL-SCNC: 107 MMOL/L (ref 95–110)
CO2 SERPL-SCNC: 26 MMOL/L (ref 23–29)
COMPLEXED PSA SERPL-MCNC: <0.01 NG/ML (ref 0–4)
CREAT SERPL-MCNC: 0.8 MG/DL (ref 0.5–1.4)
DIFFERENTIAL METHOD BLD: ABNORMAL
EOSINOPHIL # BLD AUTO: 0.2 K/UL (ref 0–0.5)
EOSINOPHIL NFR BLD: 3.8 % (ref 0–8)
ERYTHROCYTE [DISTWIDTH] IN BLOOD BY AUTOMATED COUNT: 13.3 % (ref 11.5–14.5)
EST. GFR  (NO RACE VARIABLE): >60 ML/MIN/1.73 M^2
FERRITIN SERPL-MCNC: 169 NG/ML (ref 20–300)
GLUCOSE SERPL-MCNC: 103 MG/DL (ref 70–110)
HCT VFR BLD AUTO: 37.8 % (ref 40–54)
HGB BLD-MCNC: 12.2 G/DL (ref 14–18)
IMM GRANULOCYTES # BLD AUTO: 0.08 K/UL (ref 0–0.04)
IMM GRANULOCYTES NFR BLD AUTO: 1.8 % (ref 0–0.5)
IRON SERPL-MCNC: 62 UG/DL (ref 45–160)
LYMPHOCYTES # BLD AUTO: 0.5 K/UL (ref 1–4.8)
LYMPHOCYTES NFR BLD: 11.7 % (ref 18–48)
MCH RBC QN AUTO: 29.5 PG (ref 27–31)
MCHC RBC AUTO-ENTMCNC: 32.3 G/DL (ref 32–36)
MCV RBC AUTO: 91 FL (ref 82–98)
MONOCYTES # BLD AUTO: 0.6 K/UL (ref 0.3–1)
MONOCYTES NFR BLD: 13.8 % (ref 4–15)
NEUTROPHILS # BLD AUTO: 3 K/UL (ref 1.8–7.7)
NEUTROPHILS NFR BLD: 67.8 % (ref 38–73)
NRBC BLD-RTO: 0 /100 WBC
PLATELET # BLD AUTO: 216 K/UL (ref 150–450)
PMV BLD AUTO: 10.1 FL (ref 9.2–12.9)
POTASSIUM SERPL-SCNC: 4.3 MMOL/L (ref 3.5–5.1)
PROT SERPL-MCNC: 6.4 G/DL (ref 6–8.4)
RBC # BLD AUTO: 4.14 M/UL (ref 4.6–6.2)
SATURATED IRON: 18 % (ref 20–50)
SODIUM SERPL-SCNC: 142 MMOL/L (ref 136–145)
TESTOST SERPL-MCNC: <4 NG/DL (ref 304–1227)
TOTAL IRON BINDING CAPACITY: 339 UG/DL (ref 250–450)
TRANSFERRIN SERPL-MCNC: 229 MG/DL (ref 200–375)
WBC # BLD AUTO: 4.43 K/UL (ref 3.9–12.7)

## 2024-12-09 PROCEDURE — 82728 ASSAY OF FERRITIN: CPT | Performed by: STUDENT IN AN ORGANIZED HEALTH CARE EDUCATION/TRAINING PROGRAM

## 2024-12-09 PROCEDURE — 80053 COMPREHEN METABOLIC PANEL: CPT | Performed by: STUDENT IN AN ORGANIZED HEALTH CARE EDUCATION/TRAINING PROGRAM

## 2024-12-09 PROCEDURE — 84153 ASSAY OF PSA TOTAL: CPT | Performed by: STUDENT IN AN ORGANIZED HEALTH CARE EDUCATION/TRAINING PROGRAM

## 2024-12-09 PROCEDURE — 84403 ASSAY OF TOTAL TESTOSTERONE: CPT | Performed by: STUDENT IN AN ORGANIZED HEALTH CARE EDUCATION/TRAINING PROGRAM

## 2024-12-09 PROCEDURE — 84466 ASSAY OF TRANSFERRIN: CPT | Performed by: STUDENT IN AN ORGANIZED HEALTH CARE EDUCATION/TRAINING PROGRAM

## 2024-12-09 PROCEDURE — 85025 COMPLETE CBC W/AUTO DIFF WBC: CPT | Performed by: STUDENT IN AN ORGANIZED HEALTH CARE EDUCATION/TRAINING PROGRAM

## 2024-12-18 ENCOUNTER — OFFICE VISIT (OUTPATIENT)
Dept: HEMATOLOGY/ONCOLOGY | Facility: CLINIC | Age: 69
End: 2024-12-18
Payer: COMMERCIAL

## 2024-12-18 ENCOUNTER — TELEPHONE (OUTPATIENT)
Dept: INFUSION THERAPY | Facility: HOSPITAL | Age: 69
End: 2024-12-18

## 2024-12-18 ENCOUNTER — INFUSION (OUTPATIENT)
Dept: INFUSION THERAPY | Facility: HOSPITAL | Age: 69
End: 2024-12-18
Attending: STUDENT IN AN ORGANIZED HEALTH CARE EDUCATION/TRAINING PROGRAM
Payer: COMMERCIAL

## 2024-12-18 VITALS
OXYGEN SATURATION: 98 % | HEIGHT: 74 IN | DIASTOLIC BLOOD PRESSURE: 67 MMHG | HEART RATE: 42 BPM | TEMPERATURE: 98 F | WEIGHT: 226.19 LBS | RESPIRATION RATE: 18 BRPM | SYSTOLIC BLOOD PRESSURE: 151 MMHG | BODY MASS INDEX: 29.03 KG/M2

## 2024-12-18 VITALS
HEART RATE: 40 BPM | DIASTOLIC BLOOD PRESSURE: 60 MMHG | OXYGEN SATURATION: 96 % | TEMPERATURE: 98 F | WEIGHT: 226.19 LBS | BODY MASS INDEX: 29.03 KG/M2 | HEIGHT: 74 IN | RESPIRATION RATE: 16 BRPM | SYSTOLIC BLOOD PRESSURE: 129 MMHG

## 2024-12-18 DIAGNOSIS — K76.89 HEPATIC CYST: ICD-10-CM

## 2024-12-18 DIAGNOSIS — D53.9 NUTRITIONAL ANEMIA: ICD-10-CM

## 2024-12-18 DIAGNOSIS — C61 PROSTATE CANCER: Primary | ICD-10-CM

## 2024-12-18 DIAGNOSIS — M85.852 OSTEOPENIA OF LEFT HIP: ICD-10-CM

## 2024-12-18 DIAGNOSIS — Z79.818 ANDROGEN DEPRIVATION THERAPY: ICD-10-CM

## 2024-12-18 DIAGNOSIS — R91.1 PULMONARY NODULE: ICD-10-CM

## 2024-12-18 DIAGNOSIS — D72.828 GRANULOCYTOSIS: ICD-10-CM

## 2024-12-18 PROCEDURE — 96402 CHEMO HORMON ANTINEOPL SQ/IM: CPT | Mod: PN

## 2024-12-18 PROCEDURE — 3288F FALL RISK ASSESSMENT DOCD: CPT | Mod: CPTII,S$GLB,, | Performed by: STUDENT IN AN ORGANIZED HEALTH CARE EDUCATION/TRAINING PROGRAM

## 2024-12-18 PROCEDURE — 99214 OFFICE O/P EST MOD 30 MIN: CPT | Mod: S$GLB,,, | Performed by: STUDENT IN AN ORGANIZED HEALTH CARE EDUCATION/TRAINING PROGRAM

## 2024-12-18 PROCEDURE — 1160F RVW MEDS BY RX/DR IN RCRD: CPT | Mod: CPTII,S$GLB,, | Performed by: STUDENT IN AN ORGANIZED HEALTH CARE EDUCATION/TRAINING PROGRAM

## 2024-12-18 PROCEDURE — 1126F AMNT PAIN NOTED NONE PRSNT: CPT | Mod: CPTII,S$GLB,, | Performed by: STUDENT IN AN ORGANIZED HEALTH CARE EDUCATION/TRAINING PROGRAM

## 2024-12-18 PROCEDURE — G2211 COMPLEX E/M VISIT ADD ON: HCPCS | Mod: S$GLB,,, | Performed by: STUDENT IN AN ORGANIZED HEALTH CARE EDUCATION/TRAINING PROGRAM

## 2024-12-18 PROCEDURE — 3074F SYST BP LT 130 MM HG: CPT | Mod: CPTII,S$GLB,, | Performed by: STUDENT IN AN ORGANIZED HEALTH CARE EDUCATION/TRAINING PROGRAM

## 2024-12-18 PROCEDURE — 1159F MED LIST DOCD IN RCRD: CPT | Mod: CPTII,S$GLB,, | Performed by: STUDENT IN AN ORGANIZED HEALTH CARE EDUCATION/TRAINING PROGRAM

## 2024-12-18 PROCEDURE — 1101F PT FALLS ASSESS-DOCD LE1/YR: CPT | Mod: CPTII,S$GLB,, | Performed by: STUDENT IN AN ORGANIZED HEALTH CARE EDUCATION/TRAINING PROGRAM

## 2024-12-18 PROCEDURE — 99999 PR PBB SHADOW E&M-EST. PATIENT-LVL III: CPT | Mod: PBBFAC,,, | Performed by: STUDENT IN AN ORGANIZED HEALTH CARE EDUCATION/TRAINING PROGRAM

## 2024-12-18 PROCEDURE — 3008F BODY MASS INDEX DOCD: CPT | Mod: CPTII,S$GLB,, | Performed by: STUDENT IN AN ORGANIZED HEALTH CARE EDUCATION/TRAINING PROGRAM

## 2024-12-18 PROCEDURE — 3078F DIAST BP <80 MM HG: CPT | Mod: CPTII,S$GLB,, | Performed by: STUDENT IN AN ORGANIZED HEALTH CARE EDUCATION/TRAINING PROGRAM

## 2024-12-18 PROCEDURE — 63600175 PHARM REV CODE 636 W HCPCS: Mod: JZ,JG,PN | Performed by: STUDENT IN AN ORGANIZED HEALTH CARE EDUCATION/TRAINING PROGRAM

## 2024-12-18 RX ADMIN — LEUPROLIDE ACETATE 22.5 MG: KIT at 11:12

## 2024-12-18 NOTE — PLAN OF CARE
Pt arrived to clinic today for Lupron injection and tolerated well. No changes throughout therapy. Pt aware of follow up appointments and side effects of drugs. Pt requested for injection to be given in arm today. Discharged to home. NAD.

## 2024-12-18 NOTE — TELEPHONE ENCOUNTER
JO ANN received request from Dr Carnes to assist pt with problems getting lab for B12/folate. SW called pt and he is worried [facility] may have swap insurance coverage as primary and secondary. JO ANN will follow up with [facility] and contact pt later in the week.     Arelis Hernández, LUPEW

## 2024-12-18 NOTE — PROGRESS NOTES
[facility]  FOLLOW-UP VISIT.     Reason for visit: Establish Care     Best Contact Phone Number(s): [phone] (home)      Cancer/Stage/TNM:    Cancer Staging   Very High Risk Prostate Cancer  Staging form: Prostate, AJCC 8th Edition  - Clinical stage from 11/16/2023: Stage IIIB (cT3b, cN0, cM0, PSA: 15.5, Grade Group: 3) - Signed by Cholo Mortensen MD on 11/16/2023       Oncology History   Very High Risk Prostate Cancer   11/16/2023 Initial Diagnosis    Prostate cancer: Patient's PSA previously normal until 15.48 (7/2023) -> 22.2 (9/2023).     10/3/23: MRI Prostate shows PIRADS 5 lesion 32 x 10 mm in medial and lateral aspects of peripheral zones with minimal invasion of the right seminal vesicles, no perineural or extracapsular extension visualized. 8 mm nodules visualized on left iliac bone  11/8/23: Prostate biopsy shows [place]toria 4+3=7, negative for perineural invasion, positive for LVI  11/14/23: PSMA PET shows activity confined to the prostate gland and seminal vesicles consistent with known malignancy, no lymph node metastases nor distant metastatic disease. Spiculated appearing lung nodule concerning for incidental bronchogenic carcinoma. No irregularity corresponding to iliac lesion on MRI.      11/16/2023 Cancer Staged    Staging form: Prostate, AJCC 8th Edition  - Clinical stage from 11/16/2023: Stage IIIB (cT3b, cN0, cM0, PSA: 15.5, Grade Group: 3)     2/6/2024 - 3/15/2024 Radiation Therapy    Treating physician: Radha Mortensen and Marlo  Total Dose: 55 Gy  Fractions: 28     2/6/2024 - 3/15/2024 Radiation Therapy    Treatment Site Ref. ID Energy Dose/Fx (Gy) #Fx Dose Correction (Gy) Total Dose (Gy) Start Date End Date Elapsed Days   IM Prostate Prostate 6X 2.5 28 / 28 0 70 2/6/2024 3/15/2024 38       Treating physician: Balbina  Total Dose: 70 Gy  Fractions: 28        Interval History: Frank Gay Jr. is a 69 y.o. male with localized prostate cancer who presents for follow  "up. Since last visit unable to get B12/folate checked, mild iron deficiency. Notes easy bruising/bleeding. Also notes ongoing fatigue on therapy. Hot flashes mostly at night 2-3x.     Patient presents to clinic with wife, Laisha.  ECOG Performance Status is 0.    ROS:  A complete 12-point review of systems was reviewed and is negative except as mentioned above.     Past Medical History:   Past Medical History:   Diagnosis Date    Arthritis     Cancer     PROSTATE    Elevated PSA     Genetic testing 12/21/2023    [employer] Multi Cancer Panel (70 genes) - NEGATIVE    Gout, unspecified     Personal history of venous thrombosis and embolism     After trauma in the 70's, he had a DVT with a PE    Pulmonary embolism     Sleep apnea     RESOLVED WITH UPPP AND SEPTOPLASTY/ NO MACHINE        Allergies:   Review of patient's allergies indicates:   Allergen Reactions    Celebrex  [celecoxib] Blisters, Dermatitis, Edema, Hallucinations, Hives, Itching and Swelling    Dairy aid  [lactase]      Other reaction(s): stomach cramps  Other reaction(s): Itching  Other reaction(s): Diarrhea    Eliquis  [apixaban] Blisters, Edema, Hallucinations, Other (See Comments) and Swelling    Influenza a (h1n1) vac 09 (pf)     Lactose         Medications:   Current Outpatient Medications   Medication Sig Dispense Refill    abiraterone (ZYTIGA) 250 mg Tab Take 1 tablet (250 mg total) by mouth once daily. 30 tablet 11    colchicine (COLCRYS) 0.6 mg tablet Take 0.6 mg by mouth once daily.      predniSONE (DELTASONE) 5 MG tablet Take 1 tablet (5 mg total) by mouth once daily. 30 tablet 3     No current facility-administered medications for this visit.        Physical Exam:   /60 (BP Location: Left arm, Patient Position: Sitting)   Pulse (!) 40   Temp 97.9 °F (36.6 °C) (Temporal)   Resp 16   Ht 6' 2" (1.88 m)   Wt 102.6 kg (226 lb 3.1 oz)   SpO2 96%   BMI 29.04 kg/m²      Physical Exam  Constitutional:       Appearance: Normal appearance. "   HENT:      Head: Normocephalic and atraumatic.      Mouth/Throat:      Mouth: Mucous membranes are moist.   Eyes:      Extraocular Movements: Extraocular movements intact.      Pupils: Pupils are equal, round, and reactive to light.   Cardiovascular:      Rate and Rhythm: Normal rate and regular rhythm.      Pulses: Normal pulses.      Heart sounds: No murmur heard.  Pulmonary:      Effort: Pulmonary effort is normal. No respiratory distress.      Breath sounds: Normal breath sounds.   Abdominal:      General: Abdomen is flat. There is no distension.      Palpations: Abdomen is soft.      Tenderness: There is no abdominal tenderness.   Musculoskeletal:         General: No swelling or tenderness. Normal range of motion.      Cervical back: Normal range of motion. No rigidity.   Skin:     General: Skin is warm and dry.      Coloration: Skin is not jaundiced.   Neurological:      General: No focal deficit present.      Mental Status: He is alert and oriented to person, place, and time.   Psychiatric:         Mood and Affect: Mood normal.         Behavior: Behavior normal.           Labs:   Lab Results   Component Value Date    WBC 4.43 12/09/2024    HGB 12.2 (L) 12/09/2024    HCT 37.8 (L) 12/09/2024    MCV 91 12/09/2024     12/09/2024       Lab Results   Component Value Date     12/09/2024    K 4.3 12/09/2024     12/09/2024    CO2 26 12/09/2024    BUN 22 12/09/2024    CREATININE 0.8 12/09/2024    ALBUMIN 3.4 (L) 12/09/2024    BILITOT 0.3 12/09/2024    ALKPHOS 89 12/09/2024    AST 16 12/09/2024    ALT 11 12/09/2024       Imaging:   CT Chest Without Contrast  Narrative: EXAM: CT CHEST WITHOUT CONTRAST    CLINICAL HISTORY:  Evaluate solitary pulmonary nodule.    COMPARISON: 11/22/2023, 03/08/2024.    TECHNIQUE: Axial CT imaging through the chest performed without intravenous contrast are submitted for interpretation. Multiplanar reformats were performed and interpreted.    FINDINGS:    Stable nodular  opacity in the superior segment of the right lower lobe.  No new pulmonary nodules or masses. The heart and great vessels are within normal size limits.  No mediastinal, hilar, or axillary lymphadenopathy.    No significant osseous abnormality identified.  Impression: Stable right lower lobe pulmonary nodule.  Recommend a one-year follow-up chest CT.    All CT scans at [this location] are performed using dose modulation techniques as appropriate to a performed exam including the following: automated exposure control; adjustment of the mA and/or kV according to patient size (this includes techniques or standardized protocols for targeted exams where dose is matched to indication / reason for exam; i.e. extremities or head); use of iterative reconstruction technique.    RADIOLOGIST:  MAURY Green M.D.    Finalized on: 9/24/2024 3:41 PM By:  MAURY Green MD, MD  [facility]# [id number]      2024-09-24 15:43:[id number]    [facility]            Diagnoses:       1. Prostate cancer    2. Osteopenia of left hip    3. Androgen deprivation therapy    4. Pulmonary nodule    5. Hepatic cyst    6. Nutritional anemia    7. Granulocytosis              Assessment and Plan:     #  Very High Risk Prostate Cancer, Stage IIIB ([id number]) - Based off of cT3b, [place] 4+3=7 (grade group 2) and PSA >20 patient has very high risk disease. PSMA PET shows bronchogenic lung lesions and CT Chest shows several pleural based lesions. After pulmonary evaluation, opt to follow with surveillance and considered less likely malignancy without other sites of metastases and appearance is inconsistent with primary lung malignancy. As such, plan to treat as localized prostate cancer with ADT + XRT, and patient meets high risk criteria per [facility] for the addition of Abiraterone. 12/2023 - 12/2025. Genetic counseling negative.     Due to significant ADT side effects, recommend to stop Abiraterone after one year of therapy now that pulmonary lesion thought to be less likely  to be metastatic disease. Continue through rest of current supply, remain on prednisone for additional 2w after discontinuation. Continue Lupron q3m x additional year. Patient declines integrative oncology.     Excellent PSA response, PSA <0.01.      Patient received radiation therapy 2/7/24 - 3/15 x28 fractions.  Lupron today, next due 3/2025. DEXA scan shows osteopenia of left hip, encourage 4 servings of calcium daily + vit D.  - return to clinic in 3m with next dose of Lupron  - would prefer [facility] for 2025    # Osteopenia - Left hip osteopenia, notes occasional pain but not consistent     # ADT - Hot flashes noted, patient denies need for pharmaceutical therapy or acupuncture. Declines integrative oncology    # Lung Nodule - No smoking history, no cough, does have high risk work exposure. Established care with Dr. Mas and will continue with active surveillance. Repeat CT CAP with stable pulmonary findings   - fu with Dr. Mas as scheduled 9/2025    # Hepatic lesions - <1 cm cysts seen on PSMA and CT Chest. Restaging A/P not complete, will follow on next imaging study    # Nutritional anemia - Mild iron deficiency + post-radiation toxicity. Unable to check B12/folate with insurance. Encourage colonoscopy, pt prefers cologuard which was sent.     he will return to clinic in 3m, but knows to call in the interim if symptoms change or should a problem arise.    Monica Carnes MD  Hematology/Oncology  [facility]      Med Onc Chart Routing      Follow up with physician 3 months. any  MD/NP   Follow up with CAROLINE    Infusion scheduling note    Injection scheduling note lupron after visit   Labs   Scheduling:  Preferred lab:  Lab interval:  Labs within 1w prior: cbc, cmp, psa, testosterone   Imaging    Pharmacy appointment    Other referrals

## 2024-12-18 NOTE — Clinical Note
Hi - they weren't able to get labs covered because told that Medicare wouldn't cover B12/folate. He is [employer]BS primary and Medicare secondary. Wondering if a chance we could get this sorted out? Thank you!

## 2024-12-20 ENCOUNTER — TELEPHONE (OUTPATIENT)
Dept: INFUSION THERAPY | Facility: HOSPITAL | Age: 69
End: 2024-12-20
Payer: COMMERCIAL

## 2024-12-20 DIAGNOSIS — Z79.818 ANDROGEN DEPRIVATION THERAPY: ICD-10-CM

## 2024-12-20 DIAGNOSIS — C61 PROSTATE CANCER: ICD-10-CM

## 2024-12-20 DIAGNOSIS — D53.9 NUTRITIONAL ANEMIA: Primary | ICD-10-CM

## 2024-12-20 NOTE — TELEPHONE ENCOUNTER
JO ANN called to follow up with pt regarding problems getting B12/Folate labs covered. Pt was concerned his insurance was not billed properly. He was worried [employer] was not listed as primary. JO ANN [facility] [place] [facility] [phone] regarding pt's recent labs not being covered. She saw that a ABN came up for the pt stating lab could or could not be covered and the pt chooses to have the lab or not. She saw that the lab was covered in September. She suggested the December lab may need a different Code or it could have been run too soon. JO ANN said the lab order could be resubmitted with a new Dx code. JO ANN spoke with MATTHIAS Alberto Dr nurse and she did see a different code from September to December. She will notify Dr Carnes as to how to proceed.     JO ANN spoke with the pt about he labs. He said he would wait until it is time to run all his labs again to see if covered. He said he has labs every three months and did not want to go back to the lab sooner.     JO ANN notified MATTHIAS Alberto of pt's desire to wait to attempt to runs B12/Folate until next three month check.     Arelis Hernández, LUPEW

## 2025-01-07 ENCOUNTER — TELEPHONE (OUTPATIENT)
Dept: HEMATOLOGY/ONCOLOGY | Facility: CLINIC | Age: 70
End: 2025-01-07
Payer: COMMERCIAL

## 2025-01-07 NOTE — TELEPHONE ENCOUNTER
----- Message from Cynthia sent at 1/7/2025  4:36 PM CST -----  Regarding: Correct Orders  Type:  Needs Medical Advice    Who Called: Jeanine calling from [employer] the code thaat provided is a non-covered code and they need another code for pt service   Would the patient rather a call back or a response via [facility]? Call back   Best Call Back Number: [phone]  Additional Information:

## 2025-01-15 ENCOUNTER — PATIENT MESSAGE (OUTPATIENT)
Dept: HEMATOLOGY/ONCOLOGY | Facility: CLINIC | Age: 70
End: 2025-01-15
Payer: COMMERCIAL

## 2025-02-25 ENCOUNTER — PATIENT MESSAGE (OUTPATIENT)
Dept: FAMILY MEDICINE | Facility: CLINIC | Age: 70
End: 2025-02-25
Payer: COMMERCIAL

## 2025-03-11 ENCOUNTER — PATIENT MESSAGE (OUTPATIENT)
Dept: HEMATOLOGY/ONCOLOGY | Facility: CLINIC | Age: 70
End: 2025-03-11
Payer: COMMERCIAL

## 2025-03-17 NOTE — PROGRESS NOTES
[facility]  FOLLOW-UP VISIT.     Reason for visit: Establish Care     Best Contact Phone Number(s): [phone] (home)      Cancer/Stage/TNM:    Cancer Staging   Very High Risk Prostate Cancer  Staging form: Prostate, AJCC 8th Edition  - Clinical stage from 11/16/2023: Stage IIIB (cT3b, cN0, cM0, PSA: 15.5, Grade Group: 3) - Signed by Cholo Mortensen MD on 11/16/2023       Oncology History   Very High Risk Prostate Cancer   11/16/2023 Initial Diagnosis    Prostate cancer: Patient's PSA previously normal until 15.48 (7/2023) -> 22.2 (9/2023).     10/3/23: MRI Prostate shows PIRADS 5 lesion 32 x 10 mm in medial and lateral aspects of peripheral zones with minimal invasion of the right seminal vesicles, no perineural or extracapsular extension visualized. 8 mm nodules visualized on left iliac bone  11/8/23: Prostate biopsy shows Viktoria 4+3=7, negative for perineural invasion, positive for LVI  11/14/23: PSMA PET shows activity confined to the prostate gland and seminal vesicles consistent with known malignancy, no lymph node metastases nor distant metastatic disease. Spiculated appearing lung nodule concerning for incidental bronchogenic carcinoma. No irregularity corresponding to iliac lesion on MRI.      11/16/2023 Cancer Staged    Staging form: Prostate, AJCC 8th Edition  - Clinical stage from 11/16/2023: Stage IIIB (cT3b, cN0, cM0, PSA: 15.5, Grade Group: 3)     2/6/2024 - 3/15/2024 Radiation Therapy    Treating physician: Radha Mortensen and Marlo  Total Dose: 55 Gy  Fractions: 28     2/6/2024 - 3/15/2024 Radiation Therapy    Treatment Site Ref. ID Energy Dose/Fx (Gy) #Fx Dose Correction (Gy) Total Dose (Gy) Start Date End Date Elapsed Days   IM Prostate Prostate 6X 2.5 28 / 28 0 70 2/6/2024 3/15/2024 38       Treating physician: Balbina  Total Dose: 70 Gy  Fractions: 28        Interval History: Frank Gay Jr. is a 69 y.o. male with localized prostate cancer who presents for follow  up. Notes easy bruising/bleeding. Also notes ongoing fatigue on therapy. States his hot flashes recently started to become more intense and more frequent but does seem to note that they fluctuate.  Patient endorses some rectal bleeding at times due to his radiation proctitis which he is being treated with mesalamine.    Patient presents to clinic with wife, Laisha.  ECOG Performance Status is 0.    ROS:  Review of Systems   Constitutional:  Positive for diaphoresis (hot flashes).   Respiratory:  Negative for cough and shortness of breath.    Cardiovascular:  Negative for chest pain.   Gastrointestinal:  Negative for abdominal pain and diarrhea.        Rectal bleeding from radiation induced proctitis   Genitourinary:  Negative for frequency.   Musculoskeletal:  Negative for back pain.   Skin:  Negative for rash.   Neurological:  Negative for headaches.   Endo/Heme/Allergies:  Bruises/bleeds easily.   Psychiatric/Behavioral:  The patient is not nervous/anxious.          Past Medical History:   Past Medical History:   Diagnosis Date    Arthritis     Cancer     PROSTATE    Elevated PSA     Genetic testing 12/21/2023    [employer] Multi Cancer Panel (70 genes) - NEGATIVE    Gout, unspecified     Personal history of venous thrombosis and embolism     After trauma in the 70's, he had a DVT with a PE    Pulmonary embolism     Sleep apnea     RESOLVED WITH UPPP AND SEPTOPLASTY/ NO MACHINE        Allergies:   Review of patient's allergies indicates:   Allergen Reactions    Celebrex  [celecoxib] Blisters, Dermatitis, Edema, Hallucinations, Hives, Itching and Swelling    Dairy aid  [lactase]      Other reaction(s): stomach cramps  Other reaction(s): Itching  Other reaction(s): Diarrhea    Eliquis  [apixaban] Blisters, Edema, Hallucinations, Other (See Comments) and Swelling    Influenza a (h1n1) vac 09 (pf)     Lactose         Medications:   Current Outpatient Medications   Medication Sig Dispense Refill    colchicine (COLCRYS)  0.6 mg tablet Take 0.6 mg by mouth once daily.      mesalamine (CANASA) 1000 MG Supp Place 1,000 mg rectally every evening.       No current facility-administered medications for this visit.        Physical Exam:   There were no vitals taken for this visit.     Physical Exam  Vitals reviewed.   Constitutional:       General: He is not in acute distress.     Appearance: Normal appearance. He is not diaphoretic.   HENT:      Head: Normocephalic and atraumatic.      Mouth/Throat:      Mouth: Mucous membranes are moist.   Eyes:      Extraocular Movements: Extraocular movements intact.      Pupils: Pupils are equal, round, and reactive to light.   Cardiovascular:      Rate and Rhythm: Normal rate and regular rhythm.      Pulses: Normal pulses.      Heart sounds: No murmur heard.  Pulmonary:      Effort: Pulmonary effort is normal. No respiratory distress.      Breath sounds: Normal breath sounds.   Musculoskeletal:         General: No swelling or tenderness. Normal range of motion.      Cervical back: Normal range of motion. No rigidity.   Skin:     General: Skin is warm and dry.      Coloration: Skin is not jaundiced.   Neurological:      Mental Status: He is alert and oriented to person, place, and time.   Psychiatric:         Behavior: Behavior normal.         Thought Content: Thought content normal.           Labs:   Scanned in chart from [facility] - drawn on 3/15/25      Imaging:   CT Chest Without Contrast  Narrative: EXAM: CT CHEST WITHOUT CONTRAST    CLINICAL HISTORY:  Evaluate solitary pulmonary nodule.    COMPARISON: 11/22/2023, 03/08/2024.    TECHNIQUE: Axial CT imaging through the chest performed without intravenous contrast are submitted for interpretation. Multiplanar reformats were performed and interpreted.    FINDINGS:    Stable nodular opacity in the superior segment of the right lower lobe.  No new pulmonary nodules or masses. The heart and great vessels are within normal size limits.  No mediastinal,  hilar, or axillary lymphadenopathy.    No significant osseous abnormality identified.  Impression: Stable right lower lobe pulmonary nodule.  Recommend a one-year follow-up chest CT.    All CT scans at [this location] are performed using dose modulation techniques as appropriate to a performed exam including the following: automated exposure control; adjustment of the mA and/or kV according to patient size (this includes techniques or standardized protocols for targeted exams where dose is matched to indication / reason for exam; i.e. extremities or head); use of iterative reconstruction technique.    RADIOLOGIST:  MAURY Green M.D.    Finalized on: 9/24/2024 3:41 PM By:  MAURY Green MD, MD  R# [id number]      2024-09-24 15:43:59.831    [facility]            Diagnoses:       1. Very High Risk Prostate Cancer    2. Osteopenia of left hip    3. Androgen deprivation therapy    4. Pulmonary nodule    5. Nutritional anemia           Assessment and Plan:     #  Very High Risk Prostate Cancer, Stage IIIB ([id number]) - Based off of cT3b, [place] 4+3=7 (grade group 2) and PSA >20 patient has very high risk disease. PSMA PET shows bronchogenic lung lesions and CT Chest shows several pleural based lesions. After pulmonary evaluation, opt to follow with surveillance and considered less likely malignancy without other sites of metastases and appearance is inconsistent with primary lung malignancy. As such, plan to treat as localized prostate cancer with ADT + XRT, and patient meets high risk criteria per [facility] for the addition of Abiraterone. 12/2023 - 12/2025. Genetic counseling negative.     Due to significant ADT side effects, recommend to stop Abiraterone after one year of therapy now that pulmonary lesion thought to be less likely to be metastatic disease. Continue through rest of current supply, remain on prednisone for additional 2w after discontinuation. Continue Lupron q3m x additional year. Patient declines integrative  oncology.     Excellent PSA response, PSA <0.04 via [facility] 3/15/25.      Patient received radiation therapy 2/7/24 - 3/15 x28 fractions.  Lupron today, next due 6/2025. DEXA scan shows osteopenia of left hip, encourage 4 servings of calcium daily + vit D.  - return to clinic in  with next dose of Lupron  - would prefer [facility] labs for 2025 - will have drawn a few days before appt    # Osteopenia - Left hip osteopenia     # ADT - Hot flashes noted, patient denies need for pharmaceutical therapy or acupuncture. Patient uses fan and does not want any pharmacologic interventions.    # Lung Nodule - No smoking history, no cough, does have high risk work exposure. Established care with Dr. Mas and will continue with active surveillance. Repeat CT CAP with stable pulmonary findings   - fu with Dr. Mas as scheduled 9/2025 - CT scan ordered    # Hepatic lesions - <1 cm cysts seen on PSMA and CT Chest. Restaging A/P not complete, will follow on next imaging study    # Nutritional anemia - Mild iron deficiency + post-radiation toxicity. Unable to check B12/folate with insurance. Encourage colonoscopy, pt prefers cologuard which was sent.     # Radiation proctitis - being treated with mesalamine; f/u this Friday 3/21/25    He will return to clinic in , but knows to call in the interim if symptoms change or should a problem arise.    TASHIA MonroeP-C  Hematology and Medical Oncology  [facility]  [facility] [place] [facility]    34 minutes of total time spent on the encounter, which includes face to face time and non-face to face time preparing to see the patient (eg, review of tests), Obtaining and/or reviewing separately obtained history, documenting clinical information in the electronic or other health record, independently interpreting results if documented above (not separately reported) and communicating results to the patient/family/caregiver, or Care coordination (not separately  reported).         Med Onc Chart Routing      Follow up with physician . Patient will need labs ([facility]) - standing prior to seeing me in 3 months with Lupron scheduled on 6/18/25.   Follow up with CAROLINE    Infusion scheduling note    Injection scheduling note    Labs    Imaging    Pharmacy appointment    Other referrals

## 2025-03-18 ENCOUNTER — OFFICE VISIT (OUTPATIENT)
Dept: CARDIOLOGY | Facility: CLINIC | Age: 70
End: 2025-03-18
Payer: COMMERCIAL

## 2025-03-18 ENCOUNTER — INFUSION (OUTPATIENT)
Dept: INFUSION THERAPY | Facility: HOSPITAL | Age: 70
End: 2025-03-18
Attending: STUDENT IN AN ORGANIZED HEALTH CARE EDUCATION/TRAINING PROGRAM
Payer: COMMERCIAL

## 2025-03-18 ENCOUNTER — OFFICE VISIT (OUTPATIENT)
Dept: HEMATOLOGY/ONCOLOGY | Facility: CLINIC | Age: 70
End: 2025-03-18
Payer: COMMERCIAL

## 2025-03-18 VITALS
RESPIRATION RATE: 16 BRPM | OXYGEN SATURATION: 97 % | HEART RATE: 60 BPM | WEIGHT: 232.38 LBS | BODY MASS INDEX: 29.82 KG/M2 | HEIGHT: 74 IN | DIASTOLIC BLOOD PRESSURE: 71 MMHG | SYSTOLIC BLOOD PRESSURE: 152 MMHG | TEMPERATURE: 98 F

## 2025-03-18 VITALS
WEIGHT: 231.06 LBS | DIASTOLIC BLOOD PRESSURE: 72 MMHG | SYSTOLIC BLOOD PRESSURE: 123 MMHG | HEIGHT: 74 IN | HEART RATE: 59 BPM | BODY MASS INDEX: 29.65 KG/M2

## 2025-03-18 VITALS
BODY MASS INDEX: 29.82 KG/M2 | DIASTOLIC BLOOD PRESSURE: 71 MMHG | OXYGEN SATURATION: 97 % | HEIGHT: 74 IN | TEMPERATURE: 98 F | SYSTOLIC BLOOD PRESSURE: 152 MMHG | RESPIRATION RATE: 16 BRPM | WEIGHT: 232.38 LBS

## 2025-03-18 DIAGNOSIS — Z79.818 ANDROGEN DEPRIVATION THERAPY: ICD-10-CM

## 2025-03-18 DIAGNOSIS — D53.9 NUTRITIONAL ANEMIA: ICD-10-CM

## 2025-03-18 DIAGNOSIS — R00.1 BRADYCARDIA WITH 41-50 BEATS PER MINUTE: Primary | ICD-10-CM

## 2025-03-18 DIAGNOSIS — R91.1 PULMONARY NODULE: ICD-10-CM

## 2025-03-18 DIAGNOSIS — M85.852 OSTEOPENIA OF LEFT HIP: ICD-10-CM

## 2025-03-18 DIAGNOSIS — C61 PROSTATE CANCER: Primary | ICD-10-CM

## 2025-03-18 DIAGNOSIS — G47.33 OBSTRUCTIVE SLEEP APNEA: Chronic | ICD-10-CM

## 2025-03-18 DIAGNOSIS — E78.5 HYPERLIPIDEMIA, UNSPECIFIED HYPERLIPIDEMIA TYPE: ICD-10-CM

## 2025-03-18 PROCEDURE — 99214 OFFICE O/P EST MOD 30 MIN: CPT | Mod: S$GLB,,, | Performed by: NURSE PRACTITIONER

## 2025-03-18 PROCEDURE — 1126F AMNT PAIN NOTED NONE PRSNT: CPT | Mod: CPTII,S$GLB,,

## 2025-03-18 PROCEDURE — G2211 COMPLEX E/M VISIT ADD ON: HCPCS | Mod: S$GLB,,, | Performed by: NURSE PRACTITIONER

## 2025-03-18 PROCEDURE — 1101F PT FALLS ASSESS-DOCD LE1/YR: CPT | Mod: CPTII,S$GLB,,

## 2025-03-18 PROCEDURE — 3078F DIAST BP <80 MM HG: CPT | Mod: CPTII,S$GLB,,

## 2025-03-18 PROCEDURE — 96402 CHEMO HORMON ANTINEOPL SQ/IM: CPT | Mod: PN

## 2025-03-18 PROCEDURE — 93010 ELECTROCARDIOGRAM REPORT: CPT | Mod: S$GLB,,, | Performed by: INTERNAL MEDICINE

## 2025-03-18 PROCEDURE — 99999 PR PBB SHADOW E&M-EST. PATIENT-LVL III: CPT | Mod: PBBFAC,,,

## 2025-03-18 PROCEDURE — 3288F FALL RISK ASSESSMENT DOCD: CPT | Mod: CPTII,S$GLB,, | Performed by: NURSE PRACTITIONER

## 2025-03-18 PROCEDURE — 3008F BODY MASS INDEX DOCD: CPT | Mod: CPTII,S$GLB,, | Performed by: NURSE PRACTITIONER

## 2025-03-18 PROCEDURE — 1126F AMNT PAIN NOTED NONE PRSNT: CPT | Mod: CPTII,S$GLB,, | Performed by: NURSE PRACTITIONER

## 2025-03-18 PROCEDURE — 3077F SYST BP >= 140 MM HG: CPT | Mod: CPTII,S$GLB,, | Performed by: NURSE PRACTITIONER

## 2025-03-18 PROCEDURE — 99214 OFFICE O/P EST MOD 30 MIN: CPT | Mod: S$GLB,,,

## 2025-03-18 PROCEDURE — 3008F BODY MASS INDEX DOCD: CPT | Mod: CPTII,S$GLB,,

## 2025-03-18 PROCEDURE — 63600175 PHARM REV CODE 636 W HCPCS: Mod: JZ,TB,PN | Performed by: NURSE PRACTITIONER

## 2025-03-18 PROCEDURE — 1160F RVW MEDS BY RX/DR IN RCRD: CPT | Mod: CPTII,S$GLB,, | Performed by: NURSE PRACTITIONER

## 2025-03-18 PROCEDURE — 93005 ELECTROCARDIOGRAM TRACING: CPT | Mod: PO

## 2025-03-18 PROCEDURE — 99999 PR PBB SHADOW E&M-EST. PATIENT-LVL III: CPT | Mod: PBBFAC,,, | Performed by: NURSE PRACTITIONER

## 2025-03-18 PROCEDURE — 3078F DIAST BP <80 MM HG: CPT | Mod: CPTII,S$GLB,, | Performed by: NURSE PRACTITIONER

## 2025-03-18 PROCEDURE — 1101F PT FALLS ASSESS-DOCD LE1/YR: CPT | Mod: CPTII,S$GLB,, | Performed by: NURSE PRACTITIONER

## 2025-03-18 PROCEDURE — 3288F FALL RISK ASSESSMENT DOCD: CPT | Mod: CPTII,S$GLB,,

## 2025-03-18 PROCEDURE — 1159F MED LIST DOCD IN RCRD: CPT | Mod: CPTII,S$GLB,, | Performed by: NURSE PRACTITIONER

## 2025-03-18 PROCEDURE — 3074F SYST BP LT 130 MM HG: CPT | Mod: CPTII,S$GLB,,

## 2025-03-18 PROCEDURE — 1159F MED LIST DOCD IN RCRD: CPT | Mod: CPTII,S$GLB,,

## 2025-03-18 RX ORDER — MESALAMINE 1000 MG/1
1000 SUPPOSITORY RECTAL NIGHTLY
COMMUNITY
Start: 2025-03-12

## 2025-03-18 RX ADMIN — LEUPROLIDE ACETATE 22.5 MG: KIT at 03:03

## 2025-03-18 NOTE — PROGRESS NOTES
Subjective:    Patient ID:  Frank Gay Jr. is a 69 y.o. male patient here for evaluation No chief complaint on file.    History of Present Illness:     Frank Gay Jr. is a 69 y.o. male who follows with Dr. Mccann here today for follow up. Last seen in clinic 6/2024. He denies CP, SOB/GILLIS, palpitations, dizziness, fatigue, activity intolerance.     Focused Active Problem List includes:  Asymptomatic bradycardia   Hyperlipidemia   LILIAN  Prostate cancer       Most Recent Echocardiogram Results  Results for orders placed in visit on 10/31/23    Echo    Interpretation Summary    Left Ventricle: The left ventricle is normal in size. Normal wall thickness. Normal wall motion. There is normal systolic function with a visually estimated ejection fraction of 60 - 65%. Grade II diastolic dysfunction.    Right Ventricle: Normal right ventricular cavity size. Wall thickness is normal. Right ventricle wall motion  is normal. Systolic function is normal.    Left Atrium: Left atrium is mildly dilated.    Mitral Valve: There is moderate regurgitation.    Pulmonary Artery: There is borderline elevated pulmonary hypertension. The estimated pulmonary artery systolic pressure is 30 mmHg.    IVC/SVC: Normal venous pressure at 3 mmHg.      Most Recent Nuclear Stress Test Results  No results found for this or any previous visit.      Most Recent Cardiac PET Stress Test Results  No results found for this or any previous visit.      Most Recent Cardiovascular Angiogram results  No results found for this or any previous visit.      Other Most Recent Cardiology Results  Results for orders placed during the hospital encounter of 11/08/23    CARDIAC MONITORING STRIPS      REVIEW OF SYSTEMS: As noted in HPI   CARDIOVASCULAR: No recent chest pain, palpitations, arm/neck/jaw pain, or edema.  RESPIRATORY: No recent fever, cough, SOB.  : No blood in the urine  GI: No reflux, nausea, vomiting, or blood in stool.   MUSCULOSKELETAL: No  falls.   NEURO: No headaches, syncope, or dizziness.  EYES: No sudden changes in vision.     Past Medical History:   Diagnosis Date    Arthritis     Cancer     PROSTATE    Elevated PSA     Genetic testing 12/21/2023    [employer] Multi Cancer Panel (70 genes) - NEGATIVE    Gout, unspecified     Personal history of venous thrombosis and embolism     After trauma in the 70's, he had a DVT with a PE    Pulmonary embolism     Sleep apnea     RESOLVED WITH UPPP AND SEPTOPLASTY/ NO MACHINE     Past Surgical History:   Procedure Laterality Date    ADENOIDECTOMY      APPENDECTOMY      BIOPSY, PROSTATE, USING PROSTATE MAPPING N/A 11/8/2023    Procedure: BIOPSY, PROSTATE, USING PROSTATE MAPPING;  Surgeon: Juan Calhoun MD;  Location: [facility];  Service: Urology;  Laterality: N/A;    ELBOW SURGERY  05/2016    EYE SURGERY      Eyelid    JOINT REPLACEMENT Left 2013    KNEE    KNEE SURGERY      left knee    periodontal  03/2018    PROSTATE BIOPSY  2018    TONSILLECTOMY      TOTAL KNEE ARTHROPLASTY Right 12/18/2019    Procedure: ARTHROPLASTY, KNEE, TOTAL-DEPUY-SIGMA;  Surgeon: Ken Amezcua III, MD;  Location: [facility];  Service: Orthopedics;  Laterality: Right;    UVULOPALATOPHARYNGOPLASTY      AND SEPTOPLASTY     Social History[1]      Objective    There were no vitals filed for this visit.    The 10-year ASCVD risk score (Maryana DK, et al., 2019) is: 19.9%    Values used to calculate the score:      Age: 69 years      Sex: Male      Is Non- : No      Diabetic: No      Tobacco smoker: No      Systolic Blood Pressure: 151 mmHg      Is BP treated: No      HDL Cholesterol: 50 mg/dL      Total Cholesterol: 188 mg/dL      LAST EKG  Results for orders placed or performed in visit on 10/20/23   IN OFFICE EKG 12-LEAD (to [place])    Collection Time: 10/20/23  8:32 AM    Narrative    Test Reason : Z01.818,R00.1,    Vent. Rate : 046 BPM     Atrial Rate : 046 BPM     P-R Int : 166 ms          QRS Dur : 090 ms      QT  "Int : 460 ms       P-R-T Axes : 064 039 064 degrees     QTc Int : 402 ms    Sinus bradycardia  Otherwise normal ECG  When compared with ECG of 16-OCT-2023 09:38,  No significant change was found  Confirmed by Brittany Berger MD ([id number]) on 10/21/2023 7:48:27 AM    Referred By: ZORAIDA QUINTANILLA           Confirmed By:Brittany Berger MD     LIPIDS - LAST 2   Lab Results   Component Value Date    CHOL 188 07/22/2023    CHOL 206 (H) 06/11/2014    HDL 50 07/22/2023    HDL 45 06/11/2014    LDLCALC 121 (H) 07/22/2023    LDLCALC 140.6 06/11/2014    TRIG 78 07/22/2023    TRIG 102 06/11/2014    CHOLHDL 3.8 07/22/2023    CHOLHDL 21.8 06/11/2014     CARDIAC PROFILE - LAST 2  No results found for: "BNP", "CPK", "CPKMB", "LDH", "TROPONINI"   CBC - LAST 2  Lab Results   Component Value Date    WBC 4.43 12/09/2024    WBC 5.38 09/18/2024    HGB 12.2 (L) 12/09/2024    HGB 12.7 (L) 09/18/2024    HCT 37.8 (L) 12/09/2024    HCT 37.2 (L) 09/18/2024     12/09/2024     09/18/2024     Lab Results   Component Value Date    LABPT 13.2 12/20/2019    INR 1.0 10/16/2023    INR 1.1 12/20/2019    APTT 27.6 10/16/2023    APTT 28.7 12/20/2019     CHEMISTRY - LAST 2  Lab Results   Component Value Date     12/09/2024     09/18/2024    K 4.3 12/09/2024    K 4.3 09/18/2024    CO2 26 12/09/2024    CO2 25 09/18/2024    BUN 22 12/09/2024    BUN 19 09/18/2024    CREATININE 0.8 12/09/2024    CREATININE 0.8 09/18/2024     12/09/2024    GLU 96 09/18/2024    CALCIUM 9.2 12/09/2024    CALCIUM 9.3 09/18/2024    ALBUMIN 3.4 (L) 12/09/2024    ALBUMIN 3.4 (L) 09/18/2024    ALT 11 12/09/2024    ALT 11 09/18/2024    AST 16 12/09/2024    AST 15 09/18/2024      ENDOCRINE - LAST 2  Lab Results   Component Value Date    HGBA1C 5.4 07/22/2023    TSH 1.10 06/08/2009        PHYSICAL EXAM  CONSTITUTIONAL: Well built, well nourished in no apparent distress  NECK: no carotid bruit, no JVD  LUNGS: CTA  CHEST WALL: no tenderness  HEART: regular rate and " rhythm, S1, S2 normal, no murmur, click, rub or gallop   ABDOMEN: soft, non-tender; bowel sounds normal; no masses,  no organomegaly  EXTREMITIES: Extremities normal, no edema, no calf tenderness noted  NEURO: AAO X 3    I HAVE REVIEWED :    The vital signs, most recent cardiac testing, and most recent pertinent non-cardiology provider notes.    Current Outpatient Medications   Medication Instructions    colchicine (COLCRYS) 0.6 mg, Daily        Assessment & Plan   Asymptomatic bradycardia  EKG today: SB 55  Persistently asymptomatic    Hyperlipidemia  Stable     Obstructive sleep apnea  No CPAP         I emphasized the importance of modifying lifestyle related risk factors including tobacco avoidance, limiting alcohol intake, aerobic exercise, weight management and Mediterranean diet.      Follow up in about 1 year (around 3/18/2026).     Corey Darvill, NP [facility]   Office: [phone]       [1]   Social History  Tobacco Use    Smoking status: Never    Smokeless tobacco: Former     Quit date: 7/1/2017   Substance Use Topics    Alcohol use: No    Drug use: No

## 2025-03-19 LAB
OHS QRS DURATION: 88 MS
OHS QTC CALCULATION: 430 MS

## 2025-06-11 ENCOUNTER — PATIENT MESSAGE (OUTPATIENT)
Dept: HEMATOLOGY/ONCOLOGY | Facility: CLINIC | Age: 70
End: 2025-06-11
Payer: COMMERCIAL

## 2025-06-18 ENCOUNTER — OFFICE VISIT (OUTPATIENT)
Dept: HEMATOLOGY/ONCOLOGY | Facility: CLINIC | Age: 70
End: 2025-06-18
Payer: COMMERCIAL

## 2025-06-18 ENCOUNTER — INFUSION (OUTPATIENT)
Dept: INFUSION THERAPY | Facility: HOSPITAL | Age: 70
End: 2025-06-18
Attending: STUDENT IN AN ORGANIZED HEALTH CARE EDUCATION/TRAINING PROGRAM
Payer: COMMERCIAL

## 2025-06-18 VITALS
OXYGEN SATURATION: 98 % | RESPIRATION RATE: 18 BRPM | HEART RATE: 61 BPM | TEMPERATURE: 98 F | SYSTOLIC BLOOD PRESSURE: 144 MMHG | WEIGHT: 237.63 LBS | DIASTOLIC BLOOD PRESSURE: 60 MMHG | BODY MASS INDEX: 30.51 KG/M2

## 2025-06-18 VITALS
DIASTOLIC BLOOD PRESSURE: 60 MMHG | WEIGHT: 237.63 LBS | TEMPERATURE: 98 F | HEART RATE: 61 BPM | RESPIRATION RATE: 18 BRPM | HEIGHT: 74 IN | BODY MASS INDEX: 30.5 KG/M2 | SYSTOLIC BLOOD PRESSURE: 144 MMHG

## 2025-06-18 DIAGNOSIS — C61 PROSTATE CANCER: Primary | ICD-10-CM

## 2025-06-18 DIAGNOSIS — M85.852 OSTEOPENIA OF LEFT HIP: ICD-10-CM

## 2025-06-18 DIAGNOSIS — R91.1 PULMONARY NODULE: ICD-10-CM

## 2025-06-18 DIAGNOSIS — D53.9 NUTRITIONAL ANEMIA: ICD-10-CM

## 2025-06-18 DIAGNOSIS — K62.7 RADIATION INDUCED PROCTITIS: ICD-10-CM

## 2025-06-18 DIAGNOSIS — Z79.818 LONG TERM (CURRENT) USE OF OTHER AGENTS AFFECTING ESTROGEN RECEPTORS AND ESTROGEN LEVELS: ICD-10-CM

## 2025-06-18 PROCEDURE — 1159F MED LIST DOCD IN RCRD: CPT | Mod: CPTII,S$GLB,, | Performed by: NURSE PRACTITIONER

## 2025-06-18 PROCEDURE — 96402 CHEMO HORMON ANTINEOPL SQ/IM: CPT | Mod: PN

## 2025-06-18 PROCEDURE — 1160F RVW MEDS BY RX/DR IN RCRD: CPT | Mod: CPTII,S$GLB,, | Performed by: NURSE PRACTITIONER

## 2025-06-18 PROCEDURE — 63600175 PHARM REV CODE 636 W HCPCS: Mod: JZ,TB,PN | Performed by: NURSE PRACTITIONER

## 2025-06-18 PROCEDURE — 3008F BODY MASS INDEX DOCD: CPT | Mod: CPTII,S$GLB,, | Performed by: NURSE PRACTITIONER

## 2025-06-18 PROCEDURE — 3288F FALL RISK ASSESSMENT DOCD: CPT | Mod: CPTII,S$GLB,, | Performed by: NURSE PRACTITIONER

## 2025-06-18 PROCEDURE — 99999 PR PBB SHADOW E&M-EST. PATIENT-LVL III: CPT | Mod: PBBFAC,,, | Performed by: NURSE PRACTITIONER

## 2025-06-18 PROCEDURE — 3078F DIAST BP <80 MM HG: CPT | Mod: CPTII,S$GLB,, | Performed by: NURSE PRACTITIONER

## 2025-06-18 PROCEDURE — 1101F PT FALLS ASSESS-DOCD LE1/YR: CPT | Mod: CPTII,S$GLB,, | Performed by: NURSE PRACTITIONER

## 2025-06-18 PROCEDURE — 3077F SYST BP >= 140 MM HG: CPT | Mod: CPTII,S$GLB,, | Performed by: NURSE PRACTITIONER

## 2025-06-18 PROCEDURE — 1125F AMNT PAIN NOTED PAIN PRSNT: CPT | Mod: CPTII,S$GLB,, | Performed by: NURSE PRACTITIONER

## 2025-06-18 PROCEDURE — 99213 OFFICE O/P EST LOW 20 MIN: CPT | Mod: S$GLB,,, | Performed by: NURSE PRACTITIONER

## 2025-06-18 RX ADMIN — LEUPROLIDE ACETATE 22.5 MG: KIT at 10:06

## 2025-06-18 NOTE — PROGRESS NOTES
[facility]  FOLLOW-UP VISIT.     Reason for visit: Establish Care     Best Contact Phone Number(s): [phone] (home)      Cancer/Stage/TNM:    Cancer Staging   Very High Risk Prostate Cancer  Staging form: Prostate, AJCC 8th Edition  - Clinical stage from 11/16/2023: Stage IIIB (cT3b, cN0, cM0, PSA: 15.5, Grade Group: 3) - Signed by Cholo Mortensen MD on 11/16/2023       Oncology History   Very High Risk Prostate Cancer   11/16/2023 Initial Diagnosis    Prostate cancer: Patient's PSA previously normal until 15.48 (7/2023) -> 22.2 (9/2023).     10/3/23: MRI Prostate shows PIRADS 5 lesion 32 x 10 mm in medial and lateral aspects of peripheral zones with minimal invasion of the right seminal vesicles, no perineural or extracapsular extension visualized. 8 mm nodules visualized on left iliac bone  11/8/23: Prostate biopsy shows Viktoria 4+3=7, negative for perineural invasion, positive for LVI  11/14/23: PSMA PET shows activity confined to the prostate gland and seminal vesicles consistent with known malignancy, no lymph node metastases nor distant metastatic disease. Spiculated appearing lung nodule concerning for incidental bronchogenic carcinoma. No irregularity corresponding to iliac lesion on MRI.      11/16/2023 Cancer Staged    Staging form: Prostate, AJCC 8th Edition  - Clinical stage from 11/16/2023: Stage IIIB (cT3b, cN0, cM0, PSA: 15.5, Grade Group: 3)     2/6/2024 - 3/15/2024 Radiation Therapy    Treating physician: Radha Mortensen and Marlo  Total Dose: 55 Gy  Fractions: 28     2/6/2024 - 3/15/2024 Radiation Therapy    Treatment Site Ref. ID Energy Dose/Fx (Gy) #Fx Dose Correction (Gy) Total Dose (Gy) Start Date End Date Elapsed Days   IM Prostate Prostate 6X 2.5 28 / 28 0 70 2/6/2024 3/15/2024 38       Treating physician: Bal[place]  Total Dose: 70 Gy  Fractions: 28        Interval History: Frank Gay Jr. is a 69 y.o. male with localized prostate cancer who presents for follow  up. Patient endorses ongoing fatigue on therapy. Patient endorses hot flashes that fluctuate in intensity.  Patient endorses some rectal bleeding at times due to his radiation proctitis which he is being treated with mesalamine. He is seeing Dr. Batista on 6/24/25 for cauterization.    Patient presents to clinic with wife, Laisha. ECOG Performance Status is 0.    ROS:  Review of Systems   Constitutional:  Positive for diaphoresis (hot flashes) and malaise/fatigue.   Respiratory:  Negative for cough and shortness of breath.    Cardiovascular:  Negative for chest pain.   Gastrointestinal:  Negative for abdominal pain and diarrhea.        Rectal bleeding from radiation induced proctitis   Genitourinary:  Negative for frequency.   Musculoskeletal:  Negative for back pain.   Skin:  Negative for rash.   Neurological:  Negative for headaches.   Endo/Heme/Allergies:  Does not bruise/bleed easily.   Psychiatric/Behavioral:  The patient is not nervous/anxious.          Past Medical History:   Past Medical History:   Diagnosis Date    Arthritis     Cancer     PROSTATE    Elevated PSA     Genetic testing 12/21/2023    [employer] Multi Cancer Panel (70 genes) - NEGATIVE    Gout, unspecified     Personal history of venous thrombosis and embolism     After trauma in the 70's, he had a DVT with a PE    Pulmonary embolism     Sleep apnea     RESOLVED WITH UPPP AND SEPTOPLASTY/ NO MACHINE        Allergies:   Review of patient's allergies indicates:   Allergen Reactions    Celebrex  [celecoxib] Blisters, Dermatitis, Edema, Hallucinations, Hives, Itching and Swelling    Dairy aid  [lactase]      Other reaction(s): stomach cramps  Other reaction(s): Itching  Other reaction(s): Diarrhea    Eliquis  [apixaban] Blisters, Edema, Hallucinations, Other (See Comments) and Swelling    Influenza a (h1n1) vac 09 (pf)     Lactose         Medications:   Current Outpatient Medications   Medication Sig Dispense Refill    mesalamine (CANASA)  "1000 MG Supp Place 1,000 mg rectally every evening.      colchicine (COLCRYS) 0.6 mg tablet Take 0.6 mg by mouth once daily. (Patient not taking: Reported on 6/18/2025)       No current facility-administered medications for this visit.        Physical Exam:   BP (!) 144/60 (BP Location: Left arm, Patient Position: Sitting)   Pulse 61   Temp 98.1 °F (36.7 °C) (Temporal)   Resp 18   Ht 6' 2" (1.88 m)   Wt 107.8 kg (237 lb 10.5 oz)   BMI 30.51 kg/m²      Wt Readings from Last 3 Encounters:   06/18/25 107.8 kg (237 lb 10.5 oz)   03/18/25 105.4 kg (232 lb 5.8 oz)   03/18/25 105.4 kg (232 lb 5.8 oz)       Physical Exam  Vitals reviewed.   Constitutional:       General: He is not in acute distress.     Appearance: Normal appearance. He is not diaphoretic.   HENT:      Head: Normocephalic and atraumatic.      Mouth/Throat:      Mouth: Mucous membranes are moist.   Eyes:      General: No scleral icterus.     Extraocular Movements: Extraocular movements intact.      Pupils: Pupils are equal, round, and reactive to light.   Cardiovascular:      Rate and Rhythm: Normal rate and regular rhythm.      Pulses: Normal pulses.      Heart sounds: No murmur heard.  Pulmonary:      Effort: Pulmonary effort is normal. No respiratory distress.      Breath sounds: Normal breath sounds.   Musculoskeletal:         General: Normal range of motion.      Cervical back: Normal range of motion. No rigidity.      Right lower leg: No edema.      Left lower leg: No edema.   Skin:     General: Skin is warm and dry.      Coloration: Skin is not jaundiced.      Findings: No rash.   Neurological:      Mental Status: He is alert and oriented to person, place, and time.   Psychiatric:         Behavior: Behavior normal.         Thought Content: Thought content normal.         Labs:   Scanned in chart from [facility] - drawn on 3/15/25      Imaging:   CT Chest Without Contrast  Narrative: EXAM: CT CHEST WITHOUT CONTRAST    CLINICAL HISTORY:  Evaluate " solitary pulmonary nodule.    COMPARISON: 11/22/2023, 03/08/2024.    TECHNIQUE: Axial CT imaging through the chest performed without intravenous contrast are submitted for interpretation. Multiplanar reformats were performed and interpreted.    FINDINGS:    Stable nodular opacity in the superior segment of the right lower lobe.  No new pulmonary nodules or masses. The heart and great vessels are within normal size limits.  No mediastinal, hilar, or axillary lymphadenopathy.    No significant osseous abnormality identified.  Impression: Stable right lower lobe pulmonary nodule.  Recommend a one-year follow-up chest CT.    All CT scans at [this location] are performed using dose modulation techniques as appropriate to a performed exam including the following: automated exposure control; adjustment of the mA and/or kV according to patient size (this includes techniques or standardized protocols for targeted exams where dose is matched to indication / reason for exam; i.e. extremities or head); use of iterative reconstruction technique.    RADIOLOGIST:  MAURY Green M.D.    Finalized on: 9/24/2024 3:41 PM By:  MAURY Green MD, MD  R# [id number]      2024-09-24 15:43:[id number]    [facility]            Diagnoses:       1. Very High Risk Prostate Cancer    2. Radiation induced proctitis    3. Osteopenia of left hip    4. Androgen deprivation therapy    5. Pulmonary nodule    6. Nutritional anemia           Assessment and Plan:     #  Very High Risk Prostate Cancer, Stage IIIB ([id number]) - Based off of cT3b, Viktoria 4+3=7 (grade group 2) and PSA >20 patient has very high risk disease. PSMA PET shows bronchogenic lung lesions and CT Chest shows several pleural based lesions. After pulmonary evaluation, opt to follow with surveillance and considered less likely malignancy without other sites of metastases and appearance is inconsistent with primary lung malignancy. As such, plan to treat as localized prostate cancer with ADT + XRT,  and patient meets high risk criteria per [facility] for the addition of Abiraterone. 12/2023 - 12/2025. Genetic counseling negative.     Due to significant ADT side effects, recommend to stop Abiraterone after one year of therapy now that pulmonary lesion thought to be less likely to be metastatic disease. Continue through rest of current supply, remain on prednisone for additional 2w after discontinuation. Continue Lupron q3m x additional year. Patient declines integrative oncology.     Excellent PSA response, PSA 0.05 via [facility] 6/09/25.      Patient received radiation therapy 2/7/24 - 3/15 x28 fractions.  Lupron today, next due 9/2025.   - return to clinic in  with next dose of Lupron  - would prefer [facility] labs for 2025 - will have drawn a few days before appt    # Osteopenia - Left hip osteopenia  -DEXA scan shows osteopenia of left hip, encourage 4 servings of calcium daily + vit D.     # ADT - Hot flashes noted, patient denies need for pharmaceutical therapy or acupuncture. Patient uses fan and does not want any pharmacologic interventions.    # Lung Nodule - No smoking history, no cough, does have high risk work exposure. Established care with Dr. Mas and will continue with active surveillance. Repeat CT CAP with stable pulmonary findings   - fu with Dr. Mas as scheduled 9/2025 - CT scan ordered    # Hepatic lesions - <1 cm cysts seen on PSMA and CT Chest. Restaging A/P not complete, will follow on next imaging study    # Nutritional anemia - Mild iron deficiency + post-radiation toxicity. Likely now due to rectal bleeding from proctitis.    # Radiation proctitis - being treated with mesalamine; f/u 6/24/25 for cauterization as he continues to have rectal bleeding.     He will return to clinic in , but knows to call in the interim if symptoms change or should a problem arise.    Tamiko Toussaint, GOPAL-C  Hematology and Medical Oncology  [facility]  [facility] [place] [facility]  [place]    27 minutes of total time spent on the encounter, which includes face to face time and non-face to face time preparing to see the patient (eg, review of tests), Obtaining and/or reviewing separately obtained history, documenting clinical information in the electronic or other health record, independently interpreting results if documented above (not separately reported) and communicating results to the patient/family/caregiver, or Care coordination (not separately reported).         Med Onc Chart Routing      Follow up with physician . Patient needs to establish with  med onc in 3 months with labs ([facility]) a few days prior to seeing Dr. Vieira..   Follow up with CAROLINE    Infusion scheduling note    Injection scheduling note    Labs    Imaging    Pharmacy appointment    Other referrals

## (undated) DEVICE — DRESSING AQUACEL AG RBBN 2X45

## (undated) DEVICE — MARKER SKIN STND TIP BLUE BARR

## (undated) DEVICE — KIT TOTAL KNEE TKOFG

## (undated) DEVICE — ELECTRODE REM PLYHSV RETURN 9

## (undated) DEVICE — SEE MEDLINE ITEM 146298

## (undated) DEVICE — SYS KNEE EPAK PIN ATTUNE
Type: IMPLANTABLE DEVICE | Site: KNEE | Status: NON-FUNCTIONAL
Removed: 2019-12-18

## (undated) DEVICE — BLADE SAGITTAL 18 X 1.27 X 90M

## (undated) DEVICE — PAD COLD THERAPY KNEE WRAP ON

## (undated) DEVICE — CUP MEDICINE STERILE 2OZ

## (undated) DEVICE — CATH SUCTION 10FR

## (undated) DEVICE — QUILL SUTURE

## (undated) DEVICE — KIT IRR SUCTION HND PIECE

## (undated) DEVICE — BLADE RECIP RIBBED

## (undated) DEVICE — SOL BETADINE 5%

## (undated) DEVICE — MASK FLYTE HOOD PEEL AWAY

## (undated) DEVICE — TOWEL OR XRAY WHITE 17X26IN

## (undated) DEVICE — DRESSING TELFA STRL 4X3 LF

## (undated) DEVICE — DRESSING TRANS 4X4 TEGADERM

## (undated) DEVICE — GLOVE BIOGEL SKINSENSE PI 8.0

## (undated) DEVICE — SPONGE GAUZE 16PLY 4X4

## (undated) DEVICE — SEE MEDLINE ITEM 157144

## (undated) DEVICE — DRAPE SURG W/TWL 17 5/8X23

## (undated) DEVICE — HOOD T-5 TEAR AWAY STERILE

## (undated) DEVICE — SUT 1 36IN COATED VICRYL UN

## (undated) DEVICE — SEE MEDLINE ITEM 157131

## (undated) DEVICE — PUMP COLD THERAPY

## (undated) DEVICE — UNDERGLOVES BIOGEL PI SIZE 8

## (undated) DEVICE — DRESSING TELFA N ADH 3X8

## (undated) DEVICE — DRESSING AQUACEL RIBBON 2X45CM

## (undated) DEVICE — SOL IRR NACL .9% 3000ML

## (undated) DEVICE — PRESSURIZER HI VAC HIP KIT

## (undated) DEVICE — SUT 2/0 36IN COATED VICRYL

## (undated) DEVICE — ADHESIVE DERMABOND ADVANCED

## (undated) DEVICE — TAPE SILK 3IN

## (undated) DEVICE — DRAPE INCISE IOBAN 2 23X33IN

## (undated) DEVICE — GAUZE SPONGE 4X4 12PLY

## (undated) DEVICE — SUT MONOCYRL 4-0 PS2 UND

## (undated) DEVICE — BLADE DUAL CUT SAG 35X64X.89MM

## (undated) DEVICE — NDL SPINAL 18GX3.5 SPINOCAN

## (undated) DEVICE — SYR 50CC LL

## (undated) DEVICE — NDL 18GA X1 1/2 REG BEVEL

## (undated) DEVICE — UNDERGLOVES BIOGEL PI SIZE 8.5

## (undated) DEVICE — CONTAINER SPECIMEN STRL 4OZ